# Patient Record
Sex: FEMALE | Race: WHITE | NOT HISPANIC OR LATINO | Employment: FULL TIME | URBAN - METROPOLITAN AREA
[De-identification: names, ages, dates, MRNs, and addresses within clinical notes are randomized per-mention and may not be internally consistent; named-entity substitution may affect disease eponyms.]

---

## 2018-05-09 DIAGNOSIS — E06.3 HYPOTHYROIDISM DUE TO HASHIMOTO'S THYROIDITIS: Primary | ICD-10-CM

## 2018-05-09 DIAGNOSIS — E03.8 HYPOTHYROIDISM DUE TO HASHIMOTO'S THYROIDITIS: Primary | ICD-10-CM

## 2018-05-09 PROBLEM — E04.9 GOITER: Status: ACTIVE | Noted: 2018-05-09

## 2018-05-09 PROBLEM — E04.1 THYROID NODULE: Status: ACTIVE | Noted: 2018-05-09

## 2018-05-09 PROBLEM — E16.2 HYPOGLYCEMIA: Status: ACTIVE | Noted: 2018-05-09

## 2018-05-26 ENCOUNTER — OFFICE VISIT (OUTPATIENT)
Dept: URGENT CARE | Facility: MEDICAL CENTER | Age: 43
End: 2018-05-26

## 2018-05-26 VITALS
DIASTOLIC BLOOD PRESSURE: 56 MMHG | RESPIRATION RATE: 16 BRPM | TEMPERATURE: 98 F | BODY MASS INDEX: 20.33 KG/M2 | HEART RATE: 80 BPM | SYSTOLIC BLOOD PRESSURE: 96 MMHG | WEIGHT: 122 LBS | OXYGEN SATURATION: 99 % | HEIGHT: 65 IN

## 2018-05-26 DIAGNOSIS — I95.1 ORTHOSTATIC HYPOTENSION: Primary | ICD-10-CM

## 2018-05-26 PROCEDURE — G0382 LEV 3 HOSP TYPE B ED VISIT: HCPCS | Performed by: FAMILY MEDICINE

## 2018-05-26 RX ORDER — LEVOTHYROXINE SODIUM 88 UG/1
TABLET ORAL
COMMUNITY
Start: 2017-08-28 | End: 2018-07-11 | Stop reason: SDUPTHER

## 2018-05-26 RX ORDER — ALBUTEROL SULFATE 90 UG/1
2 AEROSOL, METERED RESPIRATORY (INHALATION) EVERY 6 HOURS
COMMUNITY
Start: 2015-12-15 | End: 2018-09-12 | Stop reason: ALTCHOICE

## 2018-05-26 RX ORDER — CLOTRIMAZOLE AND BETAMETHASONE DIPROPIONATE 10; .64 MG/G; MG/G
CREAM TOPICAL 2 TIMES DAILY
COMMUNITY
Start: 2015-09-09 | End: 2018-09-12 | Stop reason: ALTCHOICE

## 2018-05-26 RX ORDER — LORATADINE 10 MG/1
CAPSULE, LIQUID FILLED ORAL
COMMUNITY
End: 2018-09-12 | Stop reason: ALTCHOICE

## 2018-05-26 NOTE — PROGRESS NOTES
[facility]        NAME: Aziza Kraus is a 40 y o  female  : 1975    MRN: [id number]  DATE: May 26, 2018  TIME: 1:18 PM    Assessment and Plan   Orthostatic hypotension [I95 1]  1  Orthostatic hypotension           Patient Instructions       Follow up with PCP in 3-5 days  Proceed to  ER if symptoms worsen  Chief Complaint     Chief Complaint   Patient presents with    Dizziness     Pt states she had 2 episodes of feeling faint, lightheadedness, sweatiness, paleness, vomiting, and right elbow pain  Pt states her head feels funny and she is tired  History of Present Illness       Patient had 2 episodes of dizziness lightheadedness that occurred as of 3:00 a m  Her 1st episode lasted about 15 minutes before resolving  Denies any loss of consciousness  She had a 2nd episode several hours later and decided to come to urgent care to to be assessed  At the present time she is mildly symptomatic  Denies any nausea or vomiting  Denies any chest pain or palpitation  Upon further questioning, she has similar episode several years ago but does not recall having any extensive workup  She does have a history of hypothyroidism, low blood sugar  She describes that her periods are fairly regular but believes she is perimenopausal   Denies heavy menses  Review of Systems   Review of Systems   Constitutional: Positive for fatigue  HENT: Negative  Respiratory: Negative  Cardiovascular: Negative  Neurological: Positive for dizziness           Current Medications       Current Outpatient Prescriptions:     albuterol (PROVENTIL HFA,VENTOLIN HFA) 90 mcg/act inhaler, Inhale 2 puffs every 6 (six) hours, Disp: , Rfl:     clotrimazole-betamethasone (LOTRISONE) 1-0 05 % cream, Apply topically Twice daily, Disp: , Rfl:     levothyroxine (SYNTHROID) 88 mcg tablet, TAKE 1 TABLET BY MOUTH DAILY, Disp: , Rfl:     Norethin-Eth Estrad-Fe Biphas (LO LOESTRIN FE) 1 MG-10 MCG / 10 MCG TABS, Take 1 tablet by mouth daily, Disp: , Rfl:     terconazole (TERAZOL 7) 0 4 % vaginal cream, Insert 1 Applicatorful into the vagina, Disp: , Rfl:     Loratadine (CLARITIN) 10 MG CAPS, Take by mouth, Disp: , Rfl:     Current Allergies     Allergies as of 05/26/2018 - Reviewed 05/26/2018   Allergen Reaction Noted    Sulfa antibiotics Anaphylaxis 11/17/2011    Sulfites Hives and Shortness Of Breath 09/09/2015    Adhesive  [medical tape] Hives 06/24/2013    Other  01/15/2016    Penicillin g  08/24/2015            The following portions of the patient's history were reviewed and updated as appropriate: allergies, current medications, past family history, past medical history, past social history, past surgical history and problem list      History reviewed  No pertinent past medical history  History reviewed  No pertinent surgical history  History reviewed  No pertinent family history  Medications have been verified  Objective   BP 96/56   Pulse 80   Temp 98 °F (36 7 °C)   Resp 16   Ht 5' 5" (1 651 m)   Wt 55 3 kg (122 lb)   SpO2 99%   BMI 20 30 kg/m²        Physical Exam     Physical Exam   Constitutional: She is oriented to person, place, and time  Eyes: EOM are normal  Pupils are equal, round, and reactive to light  Neck: Neck supple  Cardiovascular: Normal rate, regular rhythm and normal heart sounds  Pulmonary/Chest: Effort normal and breath sounds normal    Neurological: She is alert and oriented to person, place, and time  No cranial nerve deficit  Romberg test-negative  Gait normal   Tandem gait-normal    Nursing note and vitals reviewed

## 2018-05-26 NOTE — PATIENT INSTRUCTIONS
Orthostatic blood pressure readings are within normal limits  Neurologic status normal   I advised patient to increase fluid intake, eat small frequent meals throughout the day  Also advised patient to increase salt intake  Recommend patient obtain blood pressure machine to monitor blood pressure  Recommended the patient follow up with primary care provider to request more updated thyroid function tests and blood count to rule out possible anemia  If symptoms persist or worsen today, she is to go to the emergency room  Hypotension   WHAT YOU NEED TO KNOW:   Hypotension is a condition that causes your blood pressure (BP) to drop lower than it should be  Hypotension may be mild, serious, or life-threatening  DISCHARGE INSTRUCTIONS:   Medicines:   · Alpha-adrenoreceptor agonists: These medicines may increase your BP and decrease your symptoms  Take these medicines as directed  · Steroids: This medicine helps prevent salt loss from your body  Steroids may also help increase the amount of fluid in your body and raise your BP  · Vasopressors: These medicines help constrict (make smaller) your blood vessels and increase your BP  Vasopressor medicines may increase the blood flow to your brain and help decrease your symptoms  · Antidiuretic hormone: This medicine helps control your BP and helps decrease your need to urinate during the night  · Antiparkinson medicine: This medicine may help increase your standing BP and decrease your symptoms  · Take your medicine as directed  Contact your healthcare provider if you think your medicine is not helping or if you have side effects  Tell him of her if you are allergic to any medicine  Keep a list of the medicines, vitamins, and herbs you take  Include the amounts, and when and why you take them  Bring the list or the pill bottles to follow-up visits  Carry your medicine list with you in case of an emergency    Follow up with your healthcare provider or specialist as directed:  Write down your questions so you remember to ask them during your visits  Check your blood pressure: You may need to check your BP at home  Record the results and bring them with you to follow-up visits  Ask when and how often to check your BP  You may need to wear a BP monitor for up to 24 hours  This will record your blood pressure during your normal daily activities  The monitor will take your BP every 15 to 30 minutes  Try to keep still while your BP is taken  Avoid heavy activity, such as exercise, while you are wearing the monitor  Manage your symptoms:   · Change positions slowly:  When you get out of bed, sit up first, then slowly move your legs to the side of the bed  If you are not having any symptoms, slowly stand up  If you have symptoms, sit down right away  · Exercise and do physical counter maneuvers:  Ask your healthcare provider or specialist about the best exercise plan for you  Physical counter maneuvers may help to increase your BP and increase blood flow to your heart  They include crossing your legs, squatting, and bending at the waist  You can also rise up on your toes while you are standing, and tighten your thigh muscles  · Drink liquids as directed:  Ask your healthcare provider or specialist how much liquid to drink each day and which liquids are best for you  Drink 500 milliliters (½ liter) of liquid quickly in the morning or before meals to help increase your BP  Your healthcare provider may tell you to drink 2 cups of coffee with, or after, breakfast and lunch  The caffeine in the coffee can help prevent a drop in your BP  Your healthcare provider may also give you caffeine pills  · Change how you eat meals: If your BP drops after eating large meals, try to eat smaller meals more often  Eat foods low in carbohydrates and cholesterol to help prevent BP drops after you eat   Ask if you need to increase the amount of sodium (salt) you eat each day     · Raise the head of your bed:  Raise the head of your bed 4 to 8 inches  This may help prevent morning BP drops and decrease the need to urinate during the night  Avoid things that make your hypotension worse:   · Do not drink alcohol:  Alcohol can make your symptoms worse  Ask for information if you need help quitting  · Avoid straining:  Activities and movements that cause you to strain can cause a drop in your BP  Activities to avoid include lifting, coughing, and other movements that increase the feeling of pressure in your chest     · Avoid the heat: This can cause a decrease in your BP  Stay inside during very hot days, or limit the amount of time you are outside  Do not take hot baths  Contact your healthcare provider or specialist if:   · You vomit several times or have diarrhea, and you cannot drink liquid  · You have a fever  · You have new or increased symptoms, such as dizziness, weakness, or fainting  · Your legs, ankles, and feet are swollen, or you gain weight for no known reason  · You have questions or concerns about your condition or care  Return to the emergency department if:   · You become confused or cannot speak  · You urinate very little or not at all  · You have a seizure  · You have chest pain or trouble breathing  · You have changes in vision or cannot see  © 2017 [address] Information is for End User's use only and may not be sold, redistributed or otherwise used for commercial purposes  All illustrations and images included in CareNotes® are the copyrighted property of [employer]  or [employer]  The above information is an  only  It is not intended as medical advice for individual conditions or treatments  Talk to your doctor, nurse or pharmacist before following any medical regimen to see if it is safe and effective for you

## 2018-07-05 ENCOUNTER — DOCUMENTATION (OUTPATIENT)
Dept: ENDOCRINOLOGY | Facility: HOSPITAL | Age: 43
End: 2018-07-05

## 2018-07-11 ENCOUNTER — TELEPHONE (OUTPATIENT)
Dept: ENDOCRINOLOGY | Facility: HOSPITAL | Age: 43
End: 2018-07-11

## 2018-07-11 DIAGNOSIS — E03.9 HYPOTHYROIDISM, UNSPECIFIED TYPE: Primary | ICD-10-CM

## 2018-07-11 RX ORDER — LEVOTHYROXINE SODIUM 88 MCG
TABLET ORAL
Qty: 30 TABLET | Refills: 8 | Status: SHIPPED | OUTPATIENT
Start: 2018-07-11 | End: 2018-09-12 | Stop reason: SDUPTHER

## 2018-09-12 ENCOUNTER — OFFICE VISIT (OUTPATIENT)
Dept: ENDOCRINOLOGY | Facility: HOSPITAL | Age: 43
End: 2018-09-12

## 2018-09-12 VITALS
DIASTOLIC BLOOD PRESSURE: 68 MMHG | WEIGHT: 127 LBS | HEART RATE: 78 BPM | BODY MASS INDEX: 21.16 KG/M2 | SYSTOLIC BLOOD PRESSURE: 110 MMHG | HEIGHT: 65 IN

## 2018-09-12 DIAGNOSIS — E03.9 HYPOTHYROIDISM, UNSPECIFIED TYPE: ICD-10-CM

## 2018-09-12 DIAGNOSIS — E16.2 HYPOGLYCEMIA: ICD-10-CM

## 2018-09-12 DIAGNOSIS — E03.8 HYPOTHYROIDISM DUE TO HASHIMOTO'S THYROIDITIS: Primary | ICD-10-CM

## 2018-09-12 DIAGNOSIS — E06.3 HYPOTHYROIDISM DUE TO HASHIMOTO'S THYROIDITIS: Primary | ICD-10-CM

## 2018-09-12 DIAGNOSIS — L65.9 HAIR LOSS: ICD-10-CM

## 2018-09-12 DIAGNOSIS — E04.9 GOITER: ICD-10-CM

## 2018-09-12 DIAGNOSIS — E04.1 THYROID NODULE: ICD-10-CM

## 2018-09-12 PROCEDURE — 99203 OFFICE O/P NEW LOW 30 MIN: CPT | Performed by: INTERNAL MEDICINE

## 2018-09-12 RX ORDER — LEVOTHYROXINE SODIUM 88 MCG
88 TABLET ORAL DAILY
Qty: 30 TABLET | Refills: 11 | Status: SHIPPED | OUTPATIENT
Start: 2018-09-12 | End: 2019-03-08 | Stop reason: SDUPTHER

## 2018-09-12 NOTE — PROGRESS NOTES
9/12/2018    Assessment/Plan      Diagnoses and all orders for this visit:    Hypothyroidism due to Hashimoto's thyroiditis  -     TSH, 3rd generation Lab Collect; Future  -     Thyroid Antibodies Panel Lab Collect; Future  -     T4, free Lab Collect; Future  -     Comprehensive metabolic panel Lab Collect; Future  -     US thyroid; Future    Thyroid nodule  -     TSH, 3rd generation Lab Collect; Future  -     Thyroid Antibodies Panel Lab Collect; Future  -     T4, free Lab Collect; Future  -     Comprehensive metabolic panel Lab Collect; Future  -     US thyroid; Future    Goiter  -     TSH, 3rd generation Lab Collect; Future  -     Thyroid Antibodies Panel Lab Collect; Future  -     T4, free Lab Collect; Future  -     Comprehensive metabolic panel Lab Collect; Future  -     US thyroid; Future    Hair loss  -     CBC and differential Lab Collect  -     Comprehensive metabolic panel Lab Collect  -     Ferritin Lab Collect  -     Testosterone, free, total Lab Collect    Hypoglycemia  -     Comprehensive metabolic panel Lab Collect  -     Comprehensive metabolic panel Lab Collect; Future    Hypothyroidism, unspecified type  -     SYNTHROID 88 MCG tablet; Take 1 tablet (88 mcg total) by mouth daily    Other orders  -     Fexofenadine HCl (ALLEGRA ALLERGY PO); Take by mouth        Assessment/Plan:  1  Hypothyroidism due to Hashimoto's thyroiditis  Most recent thyroid function tests are normal  For now, she will continue the same Synthroid 88 mcg daily  I reassured her that her hair loss is not related to her thyroid at this point  2   Thyroid nodule and goiter  We have been following this with serial ultrasounds  She is due for an ultrasound next year  3   History of hypoglycemia  She has had no recent symptoms of hypoglycemia  4   Hair loss  I reassured her that her thyroid function tests are normal in that her this is not due to her thyroid    I will evaluate that by doing a CMP, CBC, ferritin level, and free and total testosterone levels now  If these are normal, then I recommended dermatology evaluation  I have asked her to follow up in 1 year with preceding TSH, free T4, and thyroid antibody panel along with thyroid ultrasound  CC: thyroid consult    History of Present Illness     HPI: Laurent Guillen is a 44y o  year old female with history of hypothyroidism due to Hashimoto's thyroiditis with thyroid nodule and goiter  She was diagnosed with hypothyroidism around [id number]  She had transient hyperthyroidism and then hypothyroidism  She had thyroid nodules noted and biopsied at that time  The biopsy was inconclusive  She choose not to do surgery  She has been followed by serial ultrasounds  She is currently taking Synthroid brand 88 mcg daily  She always tends to be on the cold side  She denies heat intolerance, diarrhea, constipation, palpitation, or tremors  She has both anxiety and depression in the depression is a little worse recently  She has insomnia and that she will wake up frequently and is thus fatigued  She has gained about 8 lb since last year  She has no dry skin except on her face  She has no brittle nails  She continues to have hair loss and hair is thinning consistently, she wears a hair piece now  She has no diplopia  She has no difficulties with swallowing or compressive thyroid symptoms  She has no history of head or neck irradiation  Menstrual cycles do occur on a regular monthly basis, the timing can vary at times  She is now has associated low blood pressure and near syncope along with nausea with her menstrual period  Review of Systems   Constitutional: Positive for fatigue  Negative for unexpected weight change  HENT: Negative for hearing loss, tinnitus and trouble swallowing  No XRT to the head or neck in the past    Eyes: Negative for visual disturbance  Wears glasses for distance  No diplopia     Respiratory: Negative for chest tightness and shortness of breath  Will take occasional deep breaths, involuntary  Cardiovascular: Negative for chest pain, palpitations and leg swelling  Gastrointestinal: Positive for abdominal pain  Negative for constipation, diarrhea and nausea  Has some low abdominal/pelvic pain, worse with squatting or bending over  To see GYN for evaluation  Endocrine: Positive for cold intolerance  Negative for heat intolerance  Genitourinary:        Menses monthly but were more variable with timing  Now 1 day heavy menstrual flow  Then lighter, but longer  Will get low blood pressure and near syncope around menses with nausea  Musculoskeletal: Positive for arthralgias  Negative for back pain  Generally achy in trunk,back, chest wall  Skin: Negative for rash  Some facial dry skin  No brittle nails  Still lots of hair loss, very thin hair  Neurological: Negative for tremors  Psychiatric/Behavioral: Positive for dysphoric mood and sleep disturbance  The patient is nervous/anxious  Anxiety and depression, depression a bit more these days  Has not seen psychiatrist in 1 year, to go back  Waking up more at night  Historical Information   Past Medical History:   Diagnosis Date    Costochondritis     Seasonal allergies      No past surgical history on file    Social History   History   Alcohol Use No     History   Drug Use No     History   Smoking Status    Never Smoker   Smokeless Tobacco    Never Used     Family History:   Family History   Problem Relation Age of Onset    Hypertension Mother     Mitral valve prolapse Mother     Heart disease Father     Hyperlipidemia Father     Breast cancer Paternal Aunt     Hypothyroidism Paternal Aunt     Cancer Maternal Grandmother     Hypothyroidism Maternal Grandmother     No Known Problems Sister     No Known Problems Daughter        Meds/Allergies   Current Outpatient Prescriptions   Medication Sig Dispense Refill    Fexofenadine HCl (ALLEGRA ALLERGY PO) Take by mouth      SYNTHROID 88 MCG tablet Take 1 tablet (88 mcg total) by mouth daily 30 tablet 11     No current facility-administered medications for this visit  Allergies   Allergen Reactions    Sulfa Antibiotics Anaphylaxis     Anaphylaxis    Sulfites Anaphylaxis, Hives and Shortness Of Breath    Adhesive  [Medical Tape] Hives    Other      Fluorescent and halogen lights    Penicillin G        Objective   Vitals: Blood pressure 110/68, pulse 78, height 5' 5" (1 651 m), weight 57 6 kg (127 lb)  Invasive Devices          No matching active lines, drains, or airways          Physical Exam   Constitutional: She is oriented to person, place, and time  She appears well-developed and well-nourished  HENT:   Head: Normocephalic and atraumatic  Eyes: Conjunctivae and EOM are normal  Pupils are equal, round, and reactive to light  No lid lag, stare, proptosis, or periorbital edema  Neck: Normal range of motion  Neck supple  No thyromegaly present  Thyroid irregular without discrete nodules palpable  No bruits over the thyroid gland or carotids  Cardiovascular: Normal rate, regular rhythm, normal heart sounds and intact distal pulses  No murmur heard  Pulmonary/Chest: Effort normal and breath sounds normal  She has no wheezes  Abdominal: Soft  Bowel sounds are normal  There is no tenderness  Musculoskeletal: Normal range of motion  She exhibits no edema or deformity  No tremor of the outstretched hands  No spinous process tenderness  No CVA tenderness  Lymphadenopathy:     She has no cervical adenopathy  Neurological: She is alert and oriented to person, place, and time  She has normal reflexes  Skin: Skin is warm and dry  No rash noted  Hair noticeably thinner with no bald patches  Vitals reviewed  The history was obtained from the review of the chart and from the patient      Lab Results:    Network labs on 07/03/2018 showed a TSH of 2 69 with a free T4 of 0 96  No results found for this or any previous visit (from the past 77940 hour(s))        Future Appointments  Date Time Provider Alex Jolley   9/12/2019 8:40 AM Gaby Reece MD [facility]

## 2018-09-12 NOTE — PATIENT INSTRUCTIONS
Thyroid blood work is normal   No change in Synthroid 88 mcg daily  Given hair loss, let's do some blood work, if normal, then you could see a dermatologist for evaluation  Call 1 week after the blood work is drawn for results  Follow up in 1 year with blood work and thyroid ultrasound

## 2018-09-12 NOTE — LETTER
September 12, 2018     David Cuellar MD  [address]  [address] [facility] [address] [facility] [address]    Patient: Azeb Saraiba   YOB: 1975   Date of Visit: 9/12/2018       Dear Dr Abilio Castillo: Thank you for referring Azeb Sarabia to me for evaluation  Below are my notes for this consultation  If you have questions, please do not hesitate to call me  I look forward to following your patient along with you  Sincerely,        Venu Lazcano MD        CC: No Recipients  Venu Lazcano MD  9/12/2018 10:53 AM  Sign at close encounter  9/12/2018    Assessment/Plan      Diagnoses and all orders for this visit:    Hypothyroidism due to Hashimoto's thyroiditis  -     TSH, 3rd generation Lab Collect; Future  -     Thyroid Antibodies Panel Lab Collect; Future  -     T4, free Lab Collect; Future  -     Comprehensive metabolic panel Lab Collect; Future  -     US thyroid; Future    Thyroid nodule  -     TSH, 3rd generation Lab Collect; Future  -     Thyroid Antibodies Panel Lab Collect; Future  -     T4, free Lab Collect; Future  -     Comprehensive metabolic panel Lab Collect; Future  -     US thyroid; Future    Goiter  -     TSH, 3rd generation Lab Collect; Future  -     Thyroid Antibodies Panel Lab Collect; Future  -     T4, free Lab Collect; Future  -     Comprehensive metabolic panel Lab Collect; Future  -     US thyroid; Future    Hair loss  -     CBC and differential Lab Collect  -     Comprehensive metabolic panel Lab Collect  -     Ferritin Lab Collect  -     Testosterone, free, total Lab Collect    Hypoglycemia  -     Comprehensive metabolic panel Lab Collect  -     Comprehensive metabolic panel Lab Collect; Future    Hypothyroidism, unspecified type  -     SYNTHROID 88 MCG tablet; Take 1 tablet (88 mcg total) by mouth daily    Other orders  -     Fexofenadine HCl (ALLEGRA ALLERGY PO); Take by mouth        Assessment/Plan:  1  Hypothyroidism due to Hashimoto's thyroiditis    Most recent thyroid function tests are normal  For now, she will continue the same Synthroid 88 mcg daily  I reassured her that her hair loss is not related to her thyroid at this point  2   Thyroid nodule and goiter  We have been following this with serial ultrasounds  She is due for an ultrasound next year  3   History of hypoglycemia  She has had no recent symptoms of hypoglycemia  4   Hair loss  I reassured her that her thyroid function tests are normal in that her this is not due to her thyroid  I will evaluate that by doing a CMP, CBC, ferritin level, and free and total testosterone levels now  If these are normal, then I recommended dermatology evaluation  I have asked her to follow up in 1 year with preceding TSH, free T4, and thyroid antibody panel along with thyroid ultrasound  CC: thyroid consult    History of Present Illness     HPI: Mable Denver is a 44y o  year old female with history of hypothyroidism due to Hashimoto's thyroiditis with thyroid nodule and goiter  She was diagnosed with hypothyroidism around 3175-3296  She had transient hyperthyroidism and then hypothyroidism  She had thyroid nodules noted and biopsied at that time  The biopsy was inconclusive  She choose not to do surgery  She has been followed by serial ultrasounds  She is currently taking Synthroid brand 88 mcg daily  She always tends to be on the cold side  She denies heat intolerance, diarrhea, constipation, palpitation, or tremors  She has both anxiety and depression in the depression is a little worse recently  She has insomnia and that she will wake up frequently and is thus fatigued  She has gained about 8 lb since last year  She has no dry skin except on her face  She has no brittle nails  She continues to have hair loss and hair is thinning consistently, she wears a hair piece now  She has no diplopia  She has no difficulties with swallowing or compressive thyroid symptoms    She has no history of head or neck irradiation  Menstrual cycles do occur on a regular monthly basis, the timing can vary at times  She is now has associated low blood pressure and near syncope along with nausea with her menstrual period  Review of Systems   Constitutional: Positive for fatigue  Negative for unexpected weight change  HENT: Negative for hearing loss, tinnitus and trouble swallowing  No XRT to the head or neck in the past    Eyes: Negative for visual disturbance  Wears glasses for distance  No diplopia  Respiratory: Negative for chest tightness and shortness of breath  Will take occasional deep breaths, involuntary  Cardiovascular: Negative for chest pain, palpitations and leg swelling  Gastrointestinal: Positive for abdominal pain  Negative for constipation, diarrhea and nausea  Has some low abdominal/pelvic pain, worse with squatting or bending over  To see GYN for evaluation  Endocrine: Positive for cold intolerance  Negative for heat intolerance  Genitourinary:        Menses monthly but were more variable with timing  Now 1 day heavy menstrual flow  Then lighter, but longer  Will get low blood pressure and near syncope around menses with nausea  Musculoskeletal: Positive for arthralgias  Negative for back pain  Generally achy in trunk,back, chest wall  Skin: Negative for rash  Some facial dry skin  No brittle nails  Still lots of hair loss, very thin hair  Neurological: Negative for tremors  Psychiatric/Behavioral: Positive for dysphoric mood and sleep disturbance  The patient is nervous/anxious  Anxiety and depression, depression a bit more these days  Has not seen psychiatrist in 1 year, to go back  Waking up more at night  Historical Information   Past Medical History:   Diagnosis Date    Costochondritis     Seasonal allergies      No past surgical history on file    Social History   History   Alcohol Use No     History   Drug Use No     History Smoking Status    Never Smoker   Smokeless Tobacco    Never Used     Family History:   Family History   Problem Relation Age of Onset    Hypertension Mother     Mitral valve prolapse Mother     Heart disease Father     Hyperlipidemia Father     Breast cancer Paternal Aunt     Hypothyroidism Paternal Aunt     Cancer Maternal Grandmother     Hypothyroidism Maternal Grandmother     No Known Problems Sister     No Known Problems Daughter        Meds/Allergies   Current Outpatient Prescriptions   Medication Sig Dispense Refill    Fexofenadine HCl (ALLEGRA ALLERGY PO) Take by mouth      SYNTHROID 88 MCG tablet Take 1 tablet (88 mcg total) by mouth daily 30 tablet 11     No current facility-administered medications for this visit  Allergies   Allergen Reactions    Sulfa Antibiotics Anaphylaxis     Anaphylaxis    Sulfites Anaphylaxis, Hives and Shortness Of Breath    Adhesive  [Medical Tape] Hives    Other      Fluorescent and halogen lights    Penicillin G        Objective   Vitals: Blood pressure 110/68, pulse 78, height 5' 5" (1 651 m), weight 57 6 kg (127 lb)  Invasive Devices          No matching active lines, drains, or airways          Physical Exam   Constitutional: She is oriented to person, place, and time  She appears well-developed and well-nourished  HENT:   Head: Normocephalic and atraumatic  Eyes: Conjunctivae and EOM are normal  Pupils are equal, round, and reactive to light  No lid lag, stare, proptosis, or periorbital edema  Neck: Normal range of motion  Neck supple  No thyromegaly present  Thyroid irregular without discrete nodules palpable  No bruits over the thyroid gland or carotids  Cardiovascular: Normal rate, regular rhythm, normal heart sounds and intact distal pulses  No murmur heard  Pulmonary/Chest: Effort normal and breath sounds normal  She has no wheezes  Abdominal: Soft  Bowel sounds are normal  There is no tenderness     Musculoskeletal: Normal range of motion  She exhibits no edema or deformity  No tremor of the outstretched hands  No spinous process tenderness  No CVA tenderness  Lymphadenopathy:     She has no cervical adenopathy  Neurological: She is alert and oriented to person, place, and time  She has normal reflexes  Skin: Skin is warm and dry  No rash noted  Hair noticeably thinner with no bald patches  Vitals reviewed  The history was obtained from the review of the chart and from the patient  Lab Results:    Network labs on 07/03/2018 showed a TSH of 2 69 with a free T4 of 0 96  No results found for this or any previous visit (from the past 63251 hour(s))        Future Appointments  Date Time Provider Alex Jolley   9/12/2019 8:40 AM Rosario Garcia MD [facility]

## 2019-03-05 ENCOUNTER — TELEPHONE (OUTPATIENT)
Dept: ENDOCRINOLOGY | Facility: HOSPITAL | Age: 44
End: 2019-03-05

## 2019-03-05 NOTE — TELEPHONE ENCOUNTER
Patient would like to get blood work done now  Is scheduled for 9-12-19, but is experiencing symptoms (hives & weight gain) that she said are typically associated to her thyroid levels being off  FYI, sh also found out a couple of months ago that she has ovarian cysts  Do you want to order blood work for now? Told patient I'd call her back either way  Can mail lab slip & may l/m on her cell

## 2019-03-06 DIAGNOSIS — E04.9 GOITER: ICD-10-CM

## 2019-03-06 DIAGNOSIS — E04.1 THYROID NODULE: ICD-10-CM

## 2019-03-06 DIAGNOSIS — L65.9 HAIR LOSS: ICD-10-CM

## 2019-03-06 DIAGNOSIS — E06.3 HYPOTHYROIDISM DUE TO HASHIMOTO'S THYROIDITIS: Primary | ICD-10-CM

## 2019-03-06 DIAGNOSIS — E03.8 HYPOTHYROIDISM DUE TO HASHIMOTO'S THYROIDITIS: Primary | ICD-10-CM

## 2019-03-07 ENCOUNTER — APPOINTMENT (OUTPATIENT)
Dept: LAB | Facility: MEDICAL CENTER | Age: 44
End: 2019-03-07
Payer: COMMERCIAL

## 2019-03-07 ENCOUNTER — TELEPHONE (OUTPATIENT)
Dept: ENDOCRINOLOGY | Facility: HOSPITAL | Age: 44
End: 2019-03-07

## 2019-03-07 DIAGNOSIS — E04.9 GOITER: ICD-10-CM

## 2019-03-07 DIAGNOSIS — E04.1 THYROID NODULE: ICD-10-CM

## 2019-03-07 DIAGNOSIS — L65.9 HAIR LOSS: ICD-10-CM

## 2019-03-07 DIAGNOSIS — E03.8 HYPOTHYROIDISM DUE TO HASHIMOTO'S THYROIDITIS: ICD-10-CM

## 2019-03-07 DIAGNOSIS — E06.3 HYPOTHYROIDISM DUE TO HASHIMOTO'S THYROIDITIS: ICD-10-CM

## 2019-03-07 LAB — TSH SERPL DL<=0.05 MIU/L-ACNC: 3.55 UIU/ML (ref 0.36–3.74)

## 2019-03-07 PROCEDURE — 84443 ASSAY THYROID STIM HORMONE: CPT

## 2019-03-07 PROCEDURE — 36415 COLL VENOUS BLD VENIPUNCTURE: CPT

## 2019-03-07 PROCEDURE — 84439 ASSAY OF FREE THYROXINE: CPT

## 2019-03-08 ENCOUNTER — TELEPHONE (OUTPATIENT)
Dept: ENDOCRINOLOGY | Facility: HOSPITAL | Age: 44
End: 2019-03-08

## 2019-03-08 DIAGNOSIS — E04.9 GOITER: ICD-10-CM

## 2019-03-08 DIAGNOSIS — E03.9 HYPOTHYROIDISM, UNSPECIFIED TYPE: ICD-10-CM

## 2019-03-08 DIAGNOSIS — E06.3 HYPOTHYROIDISM DUE TO HASHIMOTO'S THYROIDITIS: Primary | ICD-10-CM

## 2019-03-08 DIAGNOSIS — E04.1 THYROID NODULE: ICD-10-CM

## 2019-03-08 DIAGNOSIS — E03.8 HYPOTHYROIDISM DUE TO HASHIMOTO'S THYROIDITIS: Primary | ICD-10-CM

## 2019-03-08 LAB — T4 FREE SERPL-MCNC: 1.04 NG/DL (ref 0.76–1.46)

## 2019-03-08 RX ORDER — LEVOTHYROXINE SODIUM 88 MCG
TABLET ORAL
Qty: 32 TABLET | Refills: 5 | Status: SHIPPED | OUTPATIENT
Start: 2019-03-08 | End: 2019-09-13 | Stop reason: SDUPTHER

## 2019-03-08 NOTE — TELEPHONE ENCOUNTER
Her thyroid levels are still normal so I can increase her dosage of thyroid hormone by any larger amounts or then I might make her hyperthyroid  Very small amounts of thyroid hormone can make it large difference in the body  She probably will notice any change in how she feels in about 2-4 weeks  I would repeat a TSH with free T4 in about 6-8 weeks

## 2019-03-08 NOTE — TELEPHONE ENCOUNTER
Patient had labs drawn last night and is almost out of her thyroid hormone  The lab did forget to draw the Free T4, but will be adding it on  Are you able to adjust her dose based on the TSH? She will be out of pills on Monday

## 2019-03-08 NOTE — TELEPHONE ENCOUNTER
Her thyroid blood work is a bit more on the overactive side of normal   We can increase her dose to synthroid 88 mcg 1 tablet 6 days a week and 1 5 tablets 1 day a week

## 2019-03-08 NOTE — TELEPHONE ENCOUNTER
Received call from patient  She has a few questions  She would like to know how only an increase of half a tablet a week is going to help her  She states all her other doctors have always increased in a greater amount when needed  She would also like to know how quickly she will notice a change in her symptoms, she notes hives  She would also like to know when she should have new labs done  Please advise

## 2019-03-14 ENCOUNTER — TRANSCRIBE ORDERS (OUTPATIENT)
Dept: ADMINISTRATIVE | Facility: HOSPITAL | Age: 44
End: 2019-03-14

## 2019-09-05 ENCOUNTER — TELEPHONE (OUTPATIENT)
Dept: ENDOCRINOLOGY | Facility: HOSPITAL | Age: 44
End: 2019-09-05

## 2019-09-06 DIAGNOSIS — E06.3 HYPOTHYROIDISM DUE TO HASHIMOTO'S THYROIDITIS: Primary | ICD-10-CM

## 2019-09-06 DIAGNOSIS — E03.8 HYPOTHYROIDISM DUE TO HASHIMOTO'S THYROIDITIS: Primary | ICD-10-CM

## 2019-09-06 NOTE — TELEPHONE ENCOUNTER
Reviewed Cate's lab results from [facility] dated September 4, 2019  Her comprehensive metabolic panel is normal   CBC is normal   TSH is slightly elevated at 4 21  And vitamin-D is low at 15 0  Has she missed any doses of her thyroid medication? If she currently supplementing with vitamin-D    Consistently?

## 2019-09-06 NOTE — TELEPHONE ENCOUNTER
Spoke with patient  She states she has only missed her thyroid medication once this week  She is not taking any vitamin d supplements

## 2019-09-06 NOTE — TELEPHONE ENCOUNTER
Okay   I would suggest that she continue Synthroid 88 mcg Monday through Saturday and take 2 tablets on Sunday  Recheck TSH and free T4 in 6 weeks to reassess  Goal for her TSH should be somewhere between 1 0 and 2 0  She may also start supplementing with over-the-counter vitamin D3 2000 units daily  Will continue to monitor her vitamin-D level over time

## 2019-09-13 DIAGNOSIS — E03.9 HYPOTHYROIDISM, UNSPECIFIED TYPE: ICD-10-CM

## 2019-09-13 RX ORDER — LEVOTHYROXINE SODIUM 88 MCG
TABLET ORAL
Qty: 32 TABLET | Refills: 5 | Status: SHIPPED | OUTPATIENT
Start: 2019-09-13 | End: 2019-10-22 | Stop reason: SDUPTHER

## 2019-10-03 ENCOUNTER — TELEPHONE (OUTPATIENT)
Dept: ENDOCRINOLOGY | Facility: HOSPITAL | Age: 44
End: 2019-10-03

## 2019-10-13 ENCOUNTER — OFFICE VISIT (OUTPATIENT)
Dept: URGENT CARE | Facility: MEDICAL CENTER | Age: 44
End: 2019-10-13
Payer: COMMERCIAL

## 2019-10-13 VITALS
DIASTOLIC BLOOD PRESSURE: 62 MMHG | SYSTOLIC BLOOD PRESSURE: 110 MMHG | RESPIRATION RATE: 16 BRPM | TEMPERATURE: 98.1 F | HEART RATE: 85 BPM | OXYGEN SATURATION: 99 %

## 2019-10-13 DIAGNOSIS — R51.9 ACUTE NONINTRACTABLE HEADACHE, UNSPECIFIED HEADACHE TYPE: Primary | ICD-10-CM

## 2019-10-13 DIAGNOSIS — R19.5 LOOSE STOOLS: ICD-10-CM

## 2019-10-13 PROCEDURE — G0383 LEV 4 HOSP TYPE B ED VISIT: HCPCS | Performed by: FAMILY MEDICINE

## 2019-10-13 NOTE — PROGRESS NOTES
[facility]        NAME: Cecelia France is a 38 y o  female  : 1975    MRN: [id number]  DATE: 2019  TIME: 1:44 PM    Assessment and Plan   Acute nonintractable headache, unspecified headache type [R51]  1  Acute nonintractable headache, unspecified headache type     2  Loose stools       May alternate Tylenol and Ibuprofen as needed for discomfort  Encourage fluids and rest    Maintain BRAT diet  Advance diet as tolerated  Gatorade or Pedialyte as supplementation  Avoid dairy products until symptoms resolve  F/U with PCP if symptoms persist/worsen or go to nearest emergency department if any signs of dehydration  Patient Instructions       Follow up with PCP in 3-5 days  Proceed to  ER if symptoms worsen  Chief Complaint     Chief Complaint   Patient presents with    Headache     Pt states had sudden onset of dull headache behind right eye for apporx 15 to 20 minutes following intercourse at 11 am today  Denies headache at present or vision changes   Diarrhea     Had 1 episode of diarrhea and nausea at that time  History of Present Illness       60-year-old female who presents with a headache that resolved on its own  Headache lasted for 15 minutes  She did not take any medication  Now has no neurological deficits  Had 2 loose stools this morning  Nonbloody  No nausea vomiting  Mild abdominal pain    Had Cotto's yesterday and chocolate waffles this morning      Review of Systems   Review of Systems  As above    Current Medications       Current Outpatient Medications:     Fexofenadine HCl (ALLEGRA ALLERGY PO), Take by mouth, Disp: , Rfl:     SYNTHROID 88 MCG tablet, TAKE 1 TABLET 6 DAYS OF THE WEEK AND 1 5 TABLETS 1 DAY A WEEK, Disp: 32 tablet, Rfl: 5    Current Allergies     Allergies as of 10/13/2019 - Reviewed 10/13/2019   Allergen Reaction Noted    Sulfa antibiotics Anaphylaxis 2011    Sulfites Anaphylaxis, Hives, and Shortness Of Breath 09/09/2015    Adhesive  [medical tape] Hives 06/24/2013    Other  01/15/2016    Penicillin g  08/24/2015            The following portions of the patient's history were reviewed and updated as appropriate: allergies, current medications, past family history, past medical history, past social history, past surgical history and problem list      Past Medical History:   Diagnosis Date    Costochondritis     Seasonal allergies        History reviewed  No pertinent surgical history  Family History   Problem Relation Age of Onset    Hypertension Mother     Mitral valve prolapse Mother     Heart disease Father     Hyperlipidemia Father     Breast cancer Paternal Aunt     Hypothyroidism Paternal Aunt     Cancer Maternal Grandmother     Hypothyroidism Maternal Grandmother     No Known Problems Sister     No Known Problems Daughter          Medications have been verified  Objective   /62 (BP Location: Left arm, Patient Position: Sitting, Cuff Size: Standard)   Pulse 85   Temp 98 1 °F (36 7 °C) (Tympanic)   Resp 16   SpO2 99%        Physical Exam     Physical Exam   Constitutional: She is oriented to person, place, and time  She appears well-developed and well-nourished  HENT:   Head: Normocephalic and atraumatic  Mouth/Throat: Oropharynx is clear and moist    Eyes: Pupils are equal, round, and reactive to light  Conjunctivae and EOM are normal    Neck: Neck supple  Cardiovascular: Normal rate, regular rhythm and normal heart sounds  Pulmonary/Chest: Effort normal and breath sounds normal  No respiratory distress  She has no wheezes  She has no rales  Abdominal: Soft  She exhibits no distension  There is no tenderness  Musculoskeletal: Normal range of motion  Neurological: She is alert and oriented to person, place, and time  No cranial nerve deficit  Skin: Skin is warm and dry  Psychiatric: She has a normal mood and affect   Her behavior is normal    Nursing note and vitals reviewed

## 2019-10-22 ENCOUNTER — OFFICE VISIT (OUTPATIENT)
Dept: ENDOCRINOLOGY | Facility: HOSPITAL | Age: 44
End: 2019-10-22
Payer: COMMERCIAL

## 2019-10-22 VITALS
HEART RATE: 88 BPM | HEIGHT: 65 IN | WEIGHT: 134.4 LBS | BODY MASS INDEX: 22.39 KG/M2 | SYSTOLIC BLOOD PRESSURE: 102 MMHG | DIASTOLIC BLOOD PRESSURE: 70 MMHG

## 2019-10-22 DIAGNOSIS — E06.3 HYPOTHYROIDISM DUE TO HASHIMOTO'S THYROIDITIS: Primary | ICD-10-CM

## 2019-10-22 DIAGNOSIS — E55.9 VITAMIN D DEFICIENCY: ICD-10-CM

## 2019-10-22 DIAGNOSIS — E03.9 HYPOTHYROIDISM, UNSPECIFIED TYPE: ICD-10-CM

## 2019-10-22 DIAGNOSIS — E04.1 THYROID NODULE: ICD-10-CM

## 2019-10-22 DIAGNOSIS — E04.9 GOITER: ICD-10-CM

## 2019-10-22 DIAGNOSIS — E03.8 HYPOTHYROIDISM DUE TO HASHIMOTO'S THYROIDITIS: Primary | ICD-10-CM

## 2019-10-22 PROCEDURE — 99214 OFFICE O/P EST MOD 30 MIN: CPT | Performed by: INTERNAL MEDICINE

## 2019-10-22 RX ORDER — MELATONIN
2000 DAILY
Start: 2019-10-22 | End: 2021-12-10

## 2019-10-22 RX ORDER — LEVOTHYROXINE SODIUM 88 MCG
TABLET ORAL
Qty: 35 TABLET | Refills: 11 | Status: SHIPPED | OUTPATIENT
Start: 2019-10-22 | End: 2019-12-09 | Stop reason: SDUPTHER

## 2019-10-22 NOTE — PROGRESS NOTES
10/22/2019    Assessment/Plan      Diagnoses and all orders for this visit:    Hypothyroidism due to Hashimoto's thyroiditis  -     US thyroid; Future  -     TSH, 3rd generation Lab Collect; Future  -     T4, free Lab Collect; Future  -     Comprehensive metabolic panel Lab Collect; Future  -     Vitamin D 25 hydroxy Lab Collect; Future    Thyroid nodule  -     US thyroid; Future  -     TSH, 3rd generation Lab Collect; Future  -     T4, free Lab Collect; Future  -     Comprehensive metabolic panel Lab Collect; Future  -     Vitamin D 25 hydroxy Lab Collect; Future    Goiter  -     US thyroid; Future  -     TSH, 3rd generation Lab Collect; Future  -     T4, free Lab Collect; Future  -     Comprehensive metabolic panel Lab Collect; Future  -     Vitamin D 25 hydroxy Lab Collect; Future    Vitamin D deficiency  -     TSH, 3rd generation Lab Collect; Future  -     T4, free Lab Collect; Future  -     Comprehensive metabolic panel Lab Collect; Future  -     Vitamin D 25 hydroxy Lab Collect; Future  -     cholecalciferol (VITAMIN D3) 1,000 units tablet; Take 2 tablets (2,000 Units total) by mouth daily    Hypothyroidism, unspecified type  -     SYNTHROID 88 MCG tablet; 1 tablet 5 days a week and 1 5 tablets 2 days a week        Assessment/Plan:  1  Hypothyroidism due to Hashimoto's thyroiditis  Most recent thyroid function tests are normal   She is biochemically and clinically euthyroid  She will continue the same Synthroid 88 mcg 1 tablet 5 days a week and 1 5 tablets 2 days a week  2  Thyroid nodule and goiter  She did not yet get her thyroid ultrasound done due to cost issues  I have asked her to get a thyroid ultrasound done at her earliest convenience  3  Vitamin-D deficiency  Her most recent vitamin-D level is low  I recommend vitamin-D 2000 units daily    I have asked her to follow up in 1 year with preceding CMP, 25 hydroxy vitamin-D level, TSH, and free T4       CC:   Hypothyroid, thyroid nodule and goiter follow-up    History of Present Illness     HPI: Candida Parkinson is a 38y o  year old female with history of  Hypothyroidism due to Hashimoto's thyroiditis with thyroid nodule and goiter here for follow-up  She was diagnosed with hypothyroidism or and [id number]  She did have a transient hyperthyroidism but then developed hypothyroidism or permanently  Thyroid nodules were biopsied at that time and inconclusive but she chose not to do surgery so she has been followed with serial ultrasounds for which the thyroid nodules have been stable in size  Unfortunately, she has not done a thyroid ultrasound recently due to cost issues as it is too expensive with her insurance  She is currently on brand-name Synthroid 88 mcg 1 tablet 5 days a week and 1 5 tablets 2 day a week  She admits to always being somewhat cold  She does have some fatigue as yet  She has some dry skin of the face with peeling skin  She has hair loss  She has had loose stools over the last several weeks  She denies heat intolerance, palpitation, tremors, diarrhea or constipation, anxiety or depression, insomnia, brittle nails, or weight changes  She denies diplopia  She has no compressive thyroid symptoms or difficulties with swallowing  Menstrual cycles are occurring on a regular monthly basis and lasting 7-10 days  She has a recent onset of vitamin-D deficiency and has not yet started vitamin-D replacement  Review of Systems   Constitutional: Positive for fatigue  Negative for unexpected weight change  HENT: Negative for trouble swallowing  Eyes: Negative for visual disturbance  Wears glasses  No diplopia  Eyes feel drier than normal    Respiratory: Negative for chest tightness and shortness of breath  Cardiovascular: Positive for chest pain  Negative for palpitations  Usual costochondritis pain unchanged  Gastrointestinal: Positive for abdominal pain  Negative for constipation, diarrhea and nausea          Loose stools over the past 2 weekends  Some mid a to low abdominal pain and tenderness  Endocrine: Positive for cold intolerance  Negative for heat intolerance  Genitourinary:        Menses regular on a monthly basis but lasting 7- 10 days  Musculoskeletal: Positive for arthralgias  More achy in general in the muscles  Skin: Negative for rash  Facial dry skin, peeling  Has hair loss  No brittle nails  Neurological: Negative for dizziness, tremors, light-headedness and headaches  Psychiatric/Behavioral: Negative for dysphoric mood and sleep disturbance  The patient is not nervous/anxious  Historical Information   Past Medical History:   Diagnosis Date    Costochondritis     Fibroid, uterine     Ovarian cyst     Seasonal allergies     Vitamin D deficiency      No past surgical history on file  Social History   Social History     Substance and Sexual Activity   Alcohol Use No     Social History     Substance and Sexual Activity   Drug Use No     Social History     Tobacco Use   Smoking Status Never Smoker   Smokeless Tobacco Never Used     Family History:   Family History   Problem Relation Age of Onset    Hypertension Mother     Mitral valve prolapse Mother     Heart disease Father     Hyperlipidemia Father     Breast cancer Paternal Aunt     Hypothyroidism Paternal Aunt     Cancer Maternal Grandmother     Hypothyroidism Maternal Grandmother     No Known Problems Sister     No Known Problems Daughter        Meds/Allergies   Current Outpatient Medications   Medication Sig Dispense Refill    Fexofenadine HCl (ALLEGRA ALLERGY PO) Take by mouth      SYNTHROID 88 MCG tablet 1 tablet 5 days a week and 1 5 tablets 2 days a week 35 tablet 11    cholecalciferol (VITAMIN D3) 1,000 units tablet Take 2 tablets (2,000 Units total) by mouth daily       No current facility-administered medications for this visit        Allergies   Allergen Reactions    Sulfa Antibiotics Anaphylaxis Anaphylaxis    Sulfites Anaphylaxis, Hives and Shortness Of Breath    Adhesive  [Medical Tape] Hives    Other      Fluorescent and halogen lights    Penicillin G        Objective   Vitals: Blood pressure 102/70, pulse 88, height 5' 5" (1 651 m), weight 61 kg (134 lb 6 4 oz)  Invasive Devices     None                 Physical Exam   Constitutional: She is oriented to person, place, and time  She appears well-developed and well-nourished  HENT:   Head: Normocephalic and atraumatic  Negative Ch[place]ek sign  Eyes: Pupils are equal, round, and reactive to light  Conjunctivae and EOM are normal    No lid lag, stare, proptosis, or periorbital edema  Neck: Normal range of motion  Neck supple  No thyromegaly present  Thyroid nodular irregular in feel without discrete nodules palpable  No bruits over the thyroid gland or carotids  Cardiovascular: Normal rate, regular rhythm and normal heart sounds  No murmur heard  Pulmonary/Chest: Effort normal and breath sounds normal  She has no wheezes  Musculoskeletal: Normal range of motion  She exhibits no edema or deformity  No tremor of the outstretched hands  Lymphadenopathy:     She has no cervical adenopathy  Neurological: She is alert and oriented to person, place, and time  She has normal reflexes  Deep tendon reflexes normal    Skin: Skin is warm and dry  No rash noted  Vitals reviewed  The history was obtained from the review of the chart and from the patient  Lab Results:        Blood work done at [facility] on 10/18/2019 showed a TSH of 0 87 with a free T4 of 1 05      Future Appointments   Date Time Provider Alex Jolley   10/27/2020  8:00 AM Phill Venegas MD [facility]

## 2019-10-22 NOTE — PATIENT INSTRUCTIONS
Recommend vitamin D 2000 iu daily for low vitamin D level  The thyroid blood work is excellent  Continue the synthroid 88 mcg 1 tablet 5 days a week and 1 1/2 tablets 2 days a week  Follow up in 1 year with blood work  Thyroid ultrasound is due now

## 2019-12-09 DIAGNOSIS — E03.9 HYPOTHYROIDISM, UNSPECIFIED TYPE: Primary | ICD-10-CM

## 2019-12-09 RX ORDER — LEVOTHYROXINE SODIUM 88 MCG
TABLET ORAL
Qty: 16 TABLET | Refills: 0 | Status: SHIPPED | OUTPATIENT
Start: 2019-12-09 | End: 2020-03-13 | Stop reason: SDUPTHER

## 2019-12-09 NOTE — TELEPHONE ENCOUNTER
Pt states she dropped her Synthroid down the sink and needs a 2 week supply to hold her over till she is due for a refill  Pt saw Dr Manjula Olson in October and has a 1 year follow up in October 2020

## 2020-01-02 ENCOUNTER — TELEPHONE (OUTPATIENT)
Dept: ENDOCRINOLOGY | Facility: HOSPITAL | Age: 45
End: 2020-01-02

## 2020-01-02 DIAGNOSIS — E55.9 VITAMIN D DEFICIENCY: Primary | ICD-10-CM

## 2020-01-02 NOTE — TELEPHONE ENCOUNTER
Received call from patient  She would like to know how long she needs to take 2000iu of Vitamin D3  She also is questioning whether that is enough or if she should be taking 31977ty weekly  She also wants to know when she should take the vitamin d supplements?

## 2020-01-02 NOTE — TELEPHONE ENCOUNTER
She should take vitamin-D permanently  I would repeat her vitamin-D level around April 2020  She can take the vitamin-D any time of the day as long as she just takes it every day

## 2020-01-13 ENCOUNTER — HOSPITAL ENCOUNTER (OUTPATIENT)
Dept: ULTRASOUND IMAGING | Facility: MEDICAL CENTER | Age: 45
Discharge: HOME | End: 2020-01-13
Payer: COMMERCIAL

## 2020-01-13 DIAGNOSIS — E04.1 THYROID NODULE: ICD-10-CM

## 2020-01-13 DIAGNOSIS — E04.9 GOITER: ICD-10-CM

## 2020-01-13 DIAGNOSIS — E03.8 HYPOTHYROIDISM DUE TO HASHIMOTO'S THYROIDITIS: ICD-10-CM

## 2020-01-13 DIAGNOSIS — E06.3 HYPOTHYROIDISM DUE TO HASHIMOTO'S THYROIDITIS: ICD-10-CM

## 2020-01-13 PROCEDURE — 76536 US EXAM OF HEAD AND NECK: CPT

## 2020-01-19 ENCOUNTER — OFFICE VISIT (OUTPATIENT)
Dept: URGENT CARE | Facility: MEDICAL CENTER | Age: 45
End: 2020-01-19
Payer: COMMERCIAL

## 2020-01-19 VITALS
TEMPERATURE: 98 F | DIASTOLIC BLOOD PRESSURE: 58 MMHG | WEIGHT: 132 LBS | BODY MASS INDEX: 21.99 KG/M2 | RESPIRATION RATE: 18 BRPM | HEART RATE: 84 BPM | SYSTOLIC BLOOD PRESSURE: 121 MMHG | OXYGEN SATURATION: 98 % | HEIGHT: 65 IN

## 2020-01-19 DIAGNOSIS — R07.9 CHEST PAIN, UNSPECIFIED TYPE: ICD-10-CM

## 2020-01-19 DIAGNOSIS — R00.2 PALPITATIONS: Primary | ICD-10-CM

## 2020-01-19 DIAGNOSIS — R11.0 NAUSEA: ICD-10-CM

## 2020-01-19 PROCEDURE — 93005 ELECTROCARDIOGRAM TRACING: CPT | Performed by: PHYSICIAN ASSISTANT

## 2020-01-19 PROCEDURE — G0383 LEV 4 HOSP TYPE B ED VISIT: HCPCS | Performed by: PHYSICIAN ASSISTANT

## 2020-01-19 NOTE — PROGRESS NOTES
[facility]        NAME: Shalonda Granger is a 38 y o  female  : 1975    MRN: [id number]  DATE: 2020  TIME: 9:58 AM    Assessment and Plan   Palpitations [R00 2]  1  Palpitations  ECG 12 lead    Transfer to other facility   2  Chest pain, unspecified type  Transfer to other facility   3  Nausea  Transfer to other facility         Patient Instructions     Follow up with PCP in 3-5 days  Proceed to  ER if symptoms worsen  Chief Complaint     Chief Complaint   Patient presents with    Palpitations     Patient presents with palpitations since last night  She also reports chest tightness, indigestion, and some nausea  She notes that she has a cold with congestion and she is also starting her menstrual cycle  Patient reports that she has a history of costochondritis and has had similar symptoms in the past           History of Present Illness       81-year-old female presents for heart palpitations, chest tightness and nausea since last evening  Patient states she has a history of GERD and costochondritis  She also states she has been under stress due to her daughter having health problems  Patient denies any past medical history of hypertension, diabetes, hyperlipidemia or cardiac issues  Review of Systems   Review of Systems   Constitutional: Negative  HENT: Negative  Eyes: Negative  Respiratory: Negative  Cardiovascular: Positive for chest pain and palpitations  Negative for leg swelling  Gastrointestinal: Positive for nausea  Negative for abdominal pain, diarrhea and vomiting  Skin: Negative  Neurological: Negative  Psychiatric/Behavioral: The patient is nervous/anxious            Current Medications       Current Outpatient Medications:     Fexofenadine HCl (ALLEGRA ALLERGY PO), Take by mouth, Disp: , Rfl:     SYNTHROID 88 MCG tablet, 1 tablet 5 days a week and 1 5 tablets 2 days a week, Disp: 16 tablet, Rfl: 0    cholecalciferol (VITAMIN D3) 1,000 units tablet, Take 2 tablets (2,000 Units total) by mouth daily (Patient not taking: Reported on 1/19/2020), Disp: , Rfl:     Current Allergies     Allergies as of 01/19/2020 - Reviewed 01/19/2020   Allergen Reaction Noted    Sulfa antibiotics Anaphylaxis 11/17/2011    Sulfites Anaphylaxis, Hives, and Shortness Of Breath 09/09/2015    Adhesive  [medical tape] Hives 06/24/2013    Other  01/15/2016    Penicillin g  08/24/2015            The following portions of the patient's history were reviewed and updated as appropriate: allergies, current medications, past family history, past medical history, past social history, past surgical history and problem list     Objective   /58   Pulse 84   Temp 98 °F (36 7 °C) (Tympanic)   Resp 18   Ht 5' 5" (1 651 m)   Wt 59 9 kg (132 lb)   SpO2 98%   BMI 21 97 kg/m²        Physical Exam     Physical Exam   Constitutional: Vital signs are normal  She appears well-developed and well-nourished  No distress  HENT:   Head: Normocephalic and atraumatic  Right Ear: Hearing, tympanic membrane, external ear and ear canal normal    Left Ear: Hearing, tympanic membrane, external ear and ear canal normal    Nose: Nose normal  No mucosal edema or rhinorrhea  Right sinus exhibits no maxillary sinus tenderness and no frontal sinus tenderness  Left sinus exhibits no maxillary sinus tenderness and no frontal sinus tenderness  Mouth/Throat: Uvula is midline, oropharynx is clear and moist and mucous membranes are normal  No oropharyngeal exudate, posterior oropharyngeal edema, posterior oropharyngeal erythema or tonsillar abscesses  Eyes: Conjunctivae and lids are normal    Cardiovascular: Normal rate, regular rhythm and normal heart sounds  No murmur heard  Pulmonary/Chest: Effort normal and breath sounds normal  No respiratory distress  She has no decreased breath sounds  She has no wheezes  She has no rhonchi  She has no rales     No pain with deep breathing   Lymphadenopathy: Head (right side): No submandibular and no tonsillar adenopathy present  Head (left side): No submandibular and no tonsillar adenopathy present  Skin: No rash noted  Nursing note and vitals reviewed

## 2020-01-19 NOTE — PATIENT INSTRUCTIONS
Chest pain, heart palpitations and nausea  Advised patient to go to emergency room for further evaluation and higher level of care  Patient's  will be driving her to [address]

## 2020-01-20 LAB
ATRIAL RATE: 86 BPM
P AXIS: 73 DEGREES
PR INTERVAL: 134 MS
QRS AXIS: 79 DEGREES
QRSD INTERVAL: 82 MS
QT INTERVAL: 376 MS
QTC INTERVAL: 449 MS
T WAVE AXIS: 59 DEGREES
VENTRICULAR RATE: 86 BPM

## 2020-01-20 PROCEDURE — 93010 ELECTROCARDIOGRAM REPORT: CPT | Performed by: INTERNAL MEDICINE

## 2020-02-04 ENCOUNTER — TELEPHONE (OUTPATIENT)
Dept: ENDOCRINOLOGY | Facility: HOSPITAL | Age: 45
End: 2020-02-04

## 2020-02-04 ENCOUNTER — TELEPHONE (OUTPATIENT)
Dept: OTHER | Facility: OTHER | Age: 45
End: 2020-02-04

## 2020-02-04 NOTE — TELEPHONE ENCOUNTER
Fatigue is not a known symptom of vitamin-D deficiency  I would still start the vitamin-D replacement to see if it improves her symptoms  The fatigue may be just recovering from the procedure she and the anesthesia

## 2020-02-04 NOTE — TELEPHONE ENCOUNTER
Received call from patient  She states she had a wisdom tooth removal on Friday 1/31/2020  She notes she has been more fatigued since the procedure  Her PCP did some lab work, which can be seen in [place]  She notes she had not yet started the vitamin d supplementation you suggested  She would like to know if the increased fatigue could be the vitamin D deficiency

## 2020-02-05 DIAGNOSIS — R23.2 FLUSHING: Primary | ICD-10-CM

## 2020-02-05 NOTE — PROGRESS NOTES
Patient concerned about facial flushing after eating chicken and cheese  Patient was under general anesthesia for tooth extraction on Friday  She is currently on amoxicillin  She experiences facial flushing after eating certain foods (chicken and cheese) for the past 2 days  Allergist said it is not allergy, b/c not itching, no hives and no swelling  Patient see allergist for artificial light and pressure allergy - she turns red  I have advised patient to follow up with her primary care physician if symptoms persists after she completes amoxicillin

## 2020-02-05 NOTE — TELEPHONE ENCOUNTER
[facility] @ 1204 TO DR Kristy Simpson   [phone] A/S PT  Cate Peña/1975/URGENT YES/REASON PATIENT IS EXPERIENCING FACIAL FLUSHING AFTER EATING

## 2020-02-05 NOTE — TELEPHONE ENCOUNTER
Her most recent thyroid blood work is normal so that is not the cause of her symptoms  The only other possible cause of flushing is a serotonin producing tumor at which point she would have flushing with diarrhea  This can be ruled out by doing a special 24 hour urine  I can order that if she would like to rule it out

## 2020-02-05 NOTE — TELEPHONE ENCOUNTER
Patient informed  She notes she is not have diarrhea  She does experience nausea  She would like this test ordered  She will call back with the fax number for the [facility] lab she uses

## 2020-02-05 NOTE — TELEPHONE ENCOUNTER
Patient informed  She now notes she called the on call doctor last night  See note from Ted Maria  Patient states she is trying to rule out any endocrine cause of the facial flushing she started experiencing yesterday  She states the facial flushing occurs after salty foods, which has occurred 3-4 times  She notes she had been on amoxicillin since last Wednesday and is stopping it today  She notes she has lost 15-20 lbs since her last visit due to her tooth pain  She did request an "emergency appointment" with you to discuss, I informed her we will call back to further discuss if she needs to be seen

## 2020-02-05 NOTE — TELEPHONE ENCOUNTER
I ordered a 24 hour urine and also some blood work to be done  Please make sure she knows she also needs blood work done since I did not mention that in the prior note

## 2020-02-10 ENCOUNTER — TELEPHONE (OUTPATIENT)
Dept: NEUROLOGY | Facility: CLINIC | Age: 45
End: 2020-02-10

## 2020-02-10 NOTE — TELEPHONE ENCOUNTER
Best contact number for NIDXMJT:[phone]    Emergency Contact name and number:Not Available     Referring provider and telephone number: Dr Lavern Peterson Patient oral Surgeon    Primary Care Provider Name and if affiliated with [facility]: Candace Martin 040     Reason for Appointment/Dx: Flushing of face when eating possible Neurologic origin Per [address] with General Neuro      Neurology Location patient would like to be seen: [facility]iel Vogt received? Yes                                                 Records Received? No    Have you ever seen another Neurologist?       No    Insurance Information    Insurance Name: Shekhar Rodriguez [facility]    ID/Policy #:    Secondary Insurance:    ID/Policy#: Workman's Comp/ Accident/ School  Information      Workman's Comp/Accident/School related?        No    If yes name of Insurance company: No    Date of Injury: No    Type of Injury: No     Name and Telephone Number: No    Notes: Appointment schedule with patient new patient pack sent                    Appointment date: 5- 12:30pm with Dr Hector Purcell

## 2020-03-13 DIAGNOSIS — E03.9 HYPOTHYROIDISM, UNSPECIFIED TYPE: ICD-10-CM

## 2020-03-16 RX ORDER — LEVOTHYROXINE SODIUM 88 MCG
TABLET ORAL
Qty: 100 TABLET | Refills: 0 | Status: SHIPPED | OUTPATIENT
Start: 2020-03-16 | End: 2020-08-24

## 2020-03-18 ENCOUNTER — TELEPHONE (OUTPATIENT)
Dept: ENDOCRINOLOGY | Facility: HOSPITAL | Age: 45
End: 2020-03-18

## 2020-03-18 NOTE — TELEPHONE ENCOUNTER
Patient left a message, she notes that she was recenty in the ER because she choked on some cheerios  She was diagnosed with Esophagitis and has also been having some symptoms of palpitations and other GI issues  She would like to know if you could check her thyroid labs to make sure it is not causing her these issues

## 2020-03-19 ENCOUNTER — TELEPHONE (OUTPATIENT)
Dept: ENDOCRINOLOGY | Facility: HOSPITAL | Age: 45
End: 2020-03-19

## 2020-03-19 DIAGNOSIS — E06.3 HYPOTHYROIDISM DUE TO HASHIMOTO'S THYROIDITIS: Primary | ICD-10-CM

## 2020-03-19 DIAGNOSIS — E03.8 HYPOTHYROIDISM DUE TO HASHIMOTO'S THYROIDITIS: Primary | ICD-10-CM

## 2020-03-19 NOTE — TELEPHONE ENCOUNTER
The Rolaids or Tums have calcium in them and it may interfere with the Synthroid absorption so she should wait at least 3-4 hours to take those medicines, they can't be within 3-4 hours of when she takes her Synthroid

## 2020-03-19 NOTE — TELEPHONE ENCOUNTER
Patient would like you to know that her facial flushing when eating stopped when she discontinued the Amoxicillin

## 2020-03-19 NOTE — TELEPHONE ENCOUNTER
Pt l/m stating that she has not started doing the half tablet 2 days a week of her Synthroid because she has Esophagitis and she is worried about the half tablet scratching her throat  She is also asking if she can take Rolaids or Tums with her Synthroid or if she has to wait for a certain amount of time before she takes it?

## 2020-03-20 ENCOUNTER — TELEPHONE (OUTPATIENT)
Dept: ENDOCRINOLOGY | Facility: HOSPITAL | Age: 45
End: 2020-03-20

## 2020-03-20 NOTE — TELEPHONE ENCOUNTER
Pt aware  She was prescribed Pepcid  Does she need to wait a certain amount of time after she takes her Thyroid med to take that?

## 2020-03-20 NOTE — TELEPHONE ENCOUNTER
Pt had her Thyroid labs draw yesterday  The lab is faxing them over but her TSH was 1 80 and her Free T4 is 1 53  Pt states she has elevated heart rate, fatigue and muscle pain and sleeplessness  She is wondering if her dose needs to be adjusted  She is also wondering if her choking episode and trauma to the throat would trigger her nodules?

## 2020-03-20 NOTE — TELEPHONE ENCOUNTER
Based on her thyroid blood work results which were normal, her symptoms have nothing to do with her thyroid  A choking episode in trauma in her throat will not do anything to her thyroid or her thyroid nodules

## 2020-03-27 ENCOUNTER — TELEPHONE (OUTPATIENT)
Dept: ENDOCRINOLOGY | Facility: HOSPITAL | Age: 45
End: 2020-03-27

## 2020-03-27 NOTE — TELEPHONE ENCOUNTER
Spoke with kristen  She notes she has been on a soft food diet due to esophagitis for 2 weeks  She notes the past 2 days she has frequent urination, anxiety, lightheaded and oily skin  She notes she is concerned thesey symptoms are related to hypoglycemia  She did buy a glucose monitor, but has not started testing  She states in the past she has issues with hypoglycemia  Should she test her blood sugar, when and how often?

## 2020-03-27 NOTE — TELEPHONE ENCOUNTER
She should test blood sugars if she gets these symptoms to see if low  If they are not low, then it is not hypoglycemia

## 2020-03-27 NOTE — TELEPHONE ENCOUNTER
Patient left voicemail she has concerns regarding hypoglycemia  She notes she is not testing her blood sugar  She has been experiencing dry mouth and fatigue  Left message for call back

## 2020-04-01 ENCOUNTER — TELEPHONE (OUTPATIENT)
Dept: DIABETES SERVICES | Facility: HOSPITAL | Age: 45
End: 2020-04-01

## 2020-05-14 ENCOUNTER — TELEPHONE (OUTPATIENT)
Dept: NEUROLOGY | Facility: CLINIC | Age: 45
End: 2020-05-14

## 2020-05-15 ENCOUNTER — TELEPHONE (OUTPATIENT)
Dept: NEUROLOGY | Facility: CLINIC | Age: 45
End: 2020-05-15

## 2020-05-18 ENCOUNTER — TELEMEDICINE (OUTPATIENT)
Dept: NEUROLOGY | Facility: CLINIC | Age: 45
End: 2020-05-18
Payer: COMMERCIAL

## 2020-05-18 ENCOUNTER — TELEPHONE (OUTPATIENT)
Dept: NEUROLOGY | Facility: CLINIC | Age: 45
End: 2020-05-18

## 2020-05-18 VITALS — HEIGHT: 65 IN | BODY MASS INDEX: 17.83 KG/M2 | WEIGHT: 107 LBS

## 2020-05-18 DIAGNOSIS — R23.2 FACIAL FLUSHING: Primary | ICD-10-CM

## 2020-05-18 PROCEDURE — 99202 OFFICE O/P NEW SF 15 MIN: CPT | Performed by: PSYCHIATRY & NEUROLOGY

## 2020-05-19 ENCOUNTER — TELEPHONE (OUTPATIENT)
Dept: NEUROLOGY | Facility: CLINIC | Age: 45
End: 2020-05-19

## 2020-06-04 ENCOUNTER — TELEPHONE (OUTPATIENT)
Dept: ENDOCRINOLOGY | Facility: HOSPITAL | Age: 45
End: 2020-06-04

## 2020-06-04 DIAGNOSIS — E03.8 HYPOTHYROIDISM DUE TO HASHIMOTO'S THYROIDITIS: Primary | ICD-10-CM

## 2020-06-04 DIAGNOSIS — E06.3 HYPOTHYROIDISM DUE TO HASHIMOTO'S THYROIDITIS: Primary | ICD-10-CM

## 2020-06-05 ENCOUNTER — TELEPHONE (OUTPATIENT)
Dept: ENDOCRINOLOGY | Facility: HOSPITAL | Age: 45
End: 2020-06-05

## 2020-06-08 ENCOUNTER — TELEPHONE (OUTPATIENT)
Dept: ENDOCRINOLOGY | Facility: HOSPITAL | Age: 45
End: 2020-06-08

## 2020-06-09 DIAGNOSIS — E03.8 HYPOTHYROIDISM DUE TO HASHIMOTO'S THYROIDITIS: Primary | ICD-10-CM

## 2020-06-09 DIAGNOSIS — E06.3 HYPOTHYROIDISM DUE TO HASHIMOTO'S THYROIDITIS: Primary | ICD-10-CM

## 2020-06-09 RX ORDER — LEVOTHYROXINE SODIUM 75 MCG
75 TABLET ORAL DAILY
Qty: 30 TABLET | Refills: 3 | Status: SHIPPED | OUTPATIENT
Start: 2020-06-09 | End: 2020-10-06

## 2020-08-05 ENCOUNTER — TELEPHONE (OUTPATIENT)
Dept: ENDOCRINOLOGY | Facility: HOSPITAL | Age: 45
End: 2020-08-05

## 2020-08-05 NOTE — TELEPHONE ENCOUNTER
Patient concerned that she's still not gaining weight, even though you decreased her Synthroid dose  Is still ranging between 104-107 lbs  Is consuming 4674-0876 calories a day  Wondering if there might be something else going on with her metabolism or is it just her thyroid  Asked if there was any other thyroid blood work you could order in addition to the TSH & Free T4 she's having done in about 2 weeks  Patient knows that you won't be back till next week  Please let her know what your thoughts are on this  Patient requested that we fax her lab slip to the hospitals in [place] (fax [phone])  Did not do so yet  Waiting to see if any additional tests are added

## 2020-08-06 DIAGNOSIS — R63.4 WEIGHT LOSS: ICD-10-CM

## 2020-08-06 DIAGNOSIS — E06.3 HYPOTHYROIDISM DUE TO HASHIMOTO'S THYROIDITIS: ICD-10-CM

## 2020-08-06 DIAGNOSIS — E03.8 HYPOTHYROIDISM DUE TO HASHIMOTO'S THYROIDITIS: ICD-10-CM

## 2020-08-06 DIAGNOSIS — R23.2 FLUSHING: Primary | ICD-10-CM

## 2020-08-06 NOTE — TELEPHONE ENCOUNTER
Patient would also like to know when she should have her vitamin D level retested  She has not been taking her vitamin d that Dr Nathaly Upton suggested previously?

## 2020-08-06 NOTE — TELEPHONE ENCOUNTER
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

## 2020-08-06 NOTE — TELEPHONE ENCOUNTER
Ordered labs in this regard including repeat thyroid function  The cortisol level should be done in the morning fasting between 7 and 9:00 a m  Dara Soulier

## 2020-08-07 ENCOUNTER — TELEPHONE (OUTPATIENT)
Dept: ENDOCRINOLOGY | Facility: HOSPITAL | Age: 45
End: 2020-08-07

## 2020-08-07 DIAGNOSIS — E55.9 VITAMIN D DEFICIENCY: Primary | ICD-10-CM

## 2020-08-07 NOTE — TELEPHONE ENCOUNTER
Patient would like to know if she should take her thyroid medication before her testing this weekend?

## 2020-08-07 NOTE — TELEPHONE ENCOUNTER
Received call back from patient  She researched PTH and she C/O the following symptoms: heartburn, loss of interest in activities, thinning hair, depression, tingling of the hands, urination, frequent urination

## 2020-08-11 ENCOUNTER — TRANSCRIBE ORDERS (OUTPATIENT)
Dept: ADMINISTRATIVE | Facility: HOSPITAL | Age: 45
End: 2020-08-11

## 2020-08-11 DIAGNOSIS — N63.20 LUMP OF BREAST, LEFT: Primary | ICD-10-CM

## 2020-08-11 NOTE — TELEPHONE ENCOUNTER
Received call from patient regarding lab work completed on 8/8/20  The ACTH is still pending  Patient noted the following concerns about results  1 ) is her cortisol level normal  2 ) her TSH is higher than previous, she did miss 3 pills in the past 2 months, could this be from the recent weight loss  3 ) she has not taken her vitamin D supplement as suggested  She had been drink 4-5 glasses of Horizon milk daily  Does she need the vitamin D supplement? 4 ) she has concerns about her elevated platelets  5 ) her psychiatrist  has suggested she be tested for systemic mastocytosis  6 ) she found a lump on her breast, she will be getting a mammogram, she wants to know if this could be a lymph node concern    Would you prefer she schedule an appointment?

## 2020-08-11 NOTE — TELEPHONE ENCOUNTER
Her cortisol level is high normal likely due to stress  A  Low level would cause weight loss  The calcium and parathyroid levels are normal  Her liver, kidney, salt and water balance are normal  The vitamin D level is low  The Horizon milk is not enough and she should take vitamin D 1000 units daily  Her thyroid levels are under active, the few doses missed are not causing this  What dose is she taking again? I do not address the platelets or mastocytosis, that would be her PCp to evaluate

## 2020-08-12 NOTE — TELEPHONE ENCOUNTER
Spoke with patient  She is currently taking levothyroxine 75mcg daily  She is unable to break tablets in half due to esophagitis  She would like your advise on why her levels made such a change in 2 months  She would like to know if you have any suggestions for a endocrine cause for her weight loss and inability to gain weight

## 2020-08-12 NOTE — TELEPHONE ENCOUNTER
Is she on any vitamins, iron, or calcium within 3 hours of her levothyroxine dosage? This could cause her TSH to elevate  At this point, I would leave her levothyroxine at the same dosage of 75 mcg daily and repeat her blood work in 6-8 weeks to see if it require lobe rates back to its normal   Her weight loss and inability to gain weight are not related to any endocrine causes as we have already ruled them all out  I would guess it might be related to her esophagitis or any other medicines as she is on

## 2020-08-14 DIAGNOSIS — E03.8 HYPOTHYROIDISM DUE TO HASHIMOTO'S THYROIDITIS: Primary | ICD-10-CM

## 2020-08-14 DIAGNOSIS — E06.3 HYPOTHYROIDISM DUE TO HASHIMOTO'S THYROIDITIS: Primary | ICD-10-CM

## 2020-08-18 ENCOUNTER — HOSPITAL ENCOUNTER (OUTPATIENT)
Dept: ULTRASOUND IMAGING | Facility: CLINIC | Age: 45
Discharge: HOME | End: 2020-08-18
Payer: COMMERCIAL

## 2020-08-18 ENCOUNTER — HOSPITAL ENCOUNTER (OUTPATIENT)
Dept: MAMMOGRAPHY | Facility: CLINIC | Age: 45
Discharge: HOME | End: 2020-08-18
Payer: COMMERCIAL

## 2020-08-18 ENCOUNTER — TELEPHONE (OUTPATIENT)
Dept: SURGICAL ONCOLOGY | Facility: CLINIC | Age: 45
End: 2020-08-18

## 2020-08-18 VITALS — TEMPERATURE: 96.3 F

## 2020-08-18 VITALS — HEIGHT: 65 IN | BODY MASS INDEX: 17.49 KG/M2 | WEIGHT: 105 LBS

## 2020-08-18 DIAGNOSIS — N63.20 LUMP OF BREAST, LEFT: ICD-10-CM

## 2020-08-18 PROCEDURE — G0279 TOMOSYNTHESIS, MAMMO: HCPCS

## 2020-08-18 PROCEDURE — 77066 DX MAMMO INCL CAD BI: CPT

## 2020-08-18 PROCEDURE — 76642 ULTRASOUND BREAST LIMITED: CPT

## 2020-08-18 NOTE — TELEPHONE ENCOUNTER
New Patient Encounter    New Patient Intake Form   Patient Details:  Carmenza Munoz  1975  [id number]    Background Information:  [address] starts by opening a telephone encounter and gathering the following information   Who is calling to schedule? If not self, relationship to patient? self   Referring Provider [facility]   What is the diagnosis? Ovarian mass   Is this diagnosis confirmed? Yes   When was the diagnosis? 8/18   Is there a confirmed diagnosis from a biopsy/tissue reviewed by pathology? na   Is patient aware of diagnosis? Yes   Is there a personal history and what kind? na   Is there a family history and what kind? na   Reason for visit? New Diagnosis   Have you had any imaging or labs done? If so: when, where? yes  [facility]   Are records in [employer]? yes   Was the patient told to bring a disk? no   Does the patient smoke or Vape? no   If yes, how many packs or cartridges per day? na   Scheduling Information:   Preferred [place]:  [facility]     Are there any dates/time the patient cannot be seen? na   Miscellaneous: na   After completing the above information, please route to Financial Counselor and the appropriate Nurse Navigator for review

## 2020-08-21 ENCOUNTER — TELEPHONE (OUTPATIENT)
Dept: GYNECOLOGIC ONCOLOGY | Facility: CLINIC | Age: 45
End: 2020-08-21

## 2020-08-21 NOTE — TELEPHONE ENCOUNTER
Called patient to go over screening questions for appointment on 8/24  Left message for patient to call back

## 2020-08-21 NOTE — TELEPHONE ENCOUNTER
Pt has an active [employer] plan effective 02/16/19  Pt has a pcp co-pay $20  specialist co-pay $45  ER co-pay $200    called pt to go over that info got her voicemail  Cheryle Marie left message for pt to call me back

## 2020-08-24 ENCOUNTER — CONSULT (OUTPATIENT)
Dept: GYNECOLOGIC ONCOLOGY | Facility: CLINIC | Age: 45
End: 2020-08-24
Payer: COMMERCIAL

## 2020-08-24 VITALS — TEMPERATURE: 98.3 F | HEIGHT: 65 IN | WEIGHT: 103 LBS | BODY MASS INDEX: 17.16 KG/M2

## 2020-08-24 DIAGNOSIS — N83.202 CYST OF LEFT OVARY: Primary | ICD-10-CM

## 2020-08-24 PROCEDURE — 99214 OFFICE O/P EST MOD 30 MIN: CPT | Performed by: OBSTETRICS & GYNECOLOGY

## 2020-08-24 NOTE — ASSESSMENT & PLAN NOTE
Patient is a 78-year-old very anxious female with a history of persistent intermittent adnexal masses  Her left adnexal mass is increased in size to 5 cm  Her symptoms of abdominal pain and bloating have decreased throughout this time however the patient is worried that she may have a cancer due to her recent 30 lb weight loss  She is not worried enough however to undergo a pelvic exam which she has refused multiple times during this assessment  I have explained to the patient that her evaluation is incomplete refusing the key portion of the exam however her overall risk of an ovarian cancer with normal Genetics for a persistent 5 cm complex adnexal mass in a premenopausal woman is under 5%  We discussed potentially removing the ovary however at this level of risk the patient does not wish to move ahead with surgery given her caregiver requirements for her daughter  I do not think the risk benefit ratio warrants going to surgery at this time however could certainly be discussed  The patient does have a strong family history of gynecologic malignancy  I have asked the patient to be seen by the cancer  to see if testing is warranted  Additionally we will attempt to get the originals of the images as the faxed images of the ultrasound are difficult to read  At that time we will see the patient back  A pelvic exam will be performed, the genetics will be evaluated, and a full assessment will be performed  The patient is in agreement

## 2020-08-24 NOTE — LETTER
August 24, 2020     Paul Leon DO  [address] [facility] [address]    Patient: Neo Saavedra   YOB: 1975   Date of Visit: 8/24/2020       Dear Dr Simon Vazquez: Thank you for referring Neo Saavedra to me for evaluation  Below are my notes for this consultation  If you have questions, please do not hesitate to call me  I look forward to following your patient along with you  Sincerely,        Gabe Rogers MD        CC: MD Gabe Rangel MD  8/24/2020  4:33 PM  Sign when Signing Visit  Assessment/Plan:    Problem List Items Addressed This Visit        Endocrine    Cyst of left ovary - Primary     Patient is a 78-year-old very anxious female with a history of persistent intermittent adnexal masses  Her left adnexal mass is increased in size to 5 cm  Her symptoms of abdominal pain and bloating have decreased throughout this time however the patient is worried that she may have a cancer due to her recent 30 lb weight loss  She is not worried enough however to undergo a pelvic exam which she has refused multiple times during this assessment  I have explained to the patient that her evaluation is incomplete refusing the key portion of the exam however her overall risk of an ovarian cancer with normal Genetics for a persistent 5 cm complex adnexal mass in a premenopausal woman is under 5%  We discussed potentially removing the ovary however at this level of risk the patient does not wish to move ahead with surgery given her caregiver requirements for her daughter  I do not think the risk benefit ratio warrants going to surgery at this time however could certainly be discussed  The patient does have a strong family history of gynecologic malignancy  I have asked the patient to be seen by the cancer  to see if testing is warranted    Additionally we will attempt to get the originals of the images as the faxed images of the ultrasound are difficult to read  At that time we will see the patient back  A pelvic exam will be performed, the genetics will be evaluated, and a full assessment will be performed  The patient is in agreement  Relevant Orders    Ambulatory Referral to Oncology Genetics              CHIEF COMPLAINT:  Complex adnexal mass on ultrasound January 2020        Patient ID: Prachi Emerson is a 41 y o  female  One patient is seen in consultation from [address] regarding evaluation and management of pelvic ultras abnormality of left ovary  Patient was originally seen by Dr Lauren Avelar and followed for ovarian cysts  The patient initially had symptoms of abdominal pain bloating and nausea  The pelvic ultrasound revealed intermittent ovarian cysts and the patient underwent ultrasounds every 4-6 months  DXr Micha Perez parted ways with [facility] and the patient arranged for follow-up at [facility]  A subsequent ultrasound was reperformed last week  Pelvic ultrasound in January of 2020 revealed the following: There are multiple myomas which appear intramural and stable         There is a complex predominantly cystic mass in the left ovary, likely a complicated cyst   Hypoechoic nodule in the left ovary is likely also a complicated cyst although by appearance endometrioma is difficult to exclude   Cystic masses in the left ovary are changing compared to previous studies which suggests complicated cysts which come and go      The patient did have an ultrasound performed approximately 6 months prior which revealed only dominant follicles within the ovaries  The fibroids were still present  Patient underwent follow-up ultrasound on 08/18/2020  This revealed persistent fibroid uterus with 3 fibroids measuring approximately 2 cm each  Additionally the right ovary showed a 1 8 cm cyst with internal echoes  The left ovary was 5 cm ovarian mass with internal echoes        The patient's symptoms of abdominal pain have improved  She has however lost 30 lb while maintaining a diet with calories between 1500-2,000 per day  Her menstrual cycle has been regular  Her TSH has been abnormal, initially abnormally high now initially low  She still has intermittent nausea  The patient's family history is notable for maternal grandmother with multiple gyn malignancies in her 45s however she was apparently cured and lived to [de-identified]  Her maternal aunt was tested for genetic mutation and did not have any  Her paternal grandmother  of endometrial cancer  There are several other family members with breast cancer  The patient is very worried about her cancer risk  She is also worried about maintaining the health of her teen daughter who was recently diagnosed with SVT  She would have surgery if needed but would prefer not to  The patient does have some significant urinary urge without significant incontinence  She does not have significant bowel problems with the exception of nausea  Her pain is improved from last year  She has undergone  testing in 2019 and this was  25 per the patient report  Review of Systems   Constitutional: Positive for activity change, appetite change, fatigue and unexpected weight change  HENT: Positive for dental problem, trouble swallowing and voice change  Eyes: Positive for redness  Respiratory: Negative  Cardiovascular: Positive for chest pain and palpitations  Gastrointestinal: Positive for nausea  Endocrine: Negative  Genitourinary: Positive for urgency  Musculoskeletal: Negative  Skin: Negative  Allergic/Immunologic: Positive for environmental allergies and food allergies  Neurological: Positive for dizziness, syncope, weakness and light-headedness  Hematological: Negative  Psychiatric/Behavioral: The patient is nervous/anxious          Current Outpatient Medications   Medication Sig Dispense Refill    cholecalciferol (VITAMIN D3) 1,000 units tablet Take 2 tablets (2,000 Units total) by mouth daily      Fexofenadine HCl (ALLEGRA ALLERGY PO) Take by mouth      SYNTHROID 75 MCG tablet Take 1 tablet (75 mcg total) by mouth daily 30 tablet 3     No current facility-administered medications for this visit  Allergies   Allergen Reactions    Sulfa Antibiotics Anaphylaxis     Anaphylaxis    Sulfites Anaphylaxis, Hives and Shortness Of Breath    Adhesive  [Medical Tape] Hives    Other      Fluorescent and halogen lights    Penicillin G        Past Medical History:   Diagnosis Date    Costochondritis     Fibroid, uterine     Ovarian cyst     Seasonal allergies     Vitamin D deficiency        History reviewed  No pertinent surgical history  OB History        2    Para   2    Term   2            AB        Living           SAB        TAB        Ectopic        Multiple        Live Births                     Family History   Problem Relation Age of Onset    Hypertension Mother     Mitral valve prolapse Mother     Heart disease Father     Hyperlipidemia Father     Hypothyroidism Paternal Aunt     Abdominal aortic aneurysm Paternal Aunt     Cancer Maternal Grandmother     Hypothyroidism Maternal Grandmother     No Known Problems Sister     No Known Problems Daughter     Breast cancer Other     No Known Problems Maternal Aunt        The following portions of the patient's history were reviewed and updated as appropriate: allergies, current medications, past family history, past social history, past surgical history and problem list       Objective:    Temperature 98 3 °F (36 8 °C), temperature source Tympanic, height 5' 5" (1 651 m), weight 46 7 kg (103 lb)  Body mass index is 17 14 kg/m²  Physical Exam  Constitutional:       Appearance: She is well-developed  Comments: Markedly anxious wearing Ziploc bags on her hands   HENT:      Head: Normocephalic and atraumatic     Eyes:      Pupils: Pupils are equal, round, and reactive to light  Neck:      Musculoskeletal: Normal range of motion and neck supple  Cardiovascular:      Rate and Rhythm: Normal rate and regular rhythm  Heart sounds: Normal heart sounds  Pulmonary:      Effort: Pulmonary effort is normal  No respiratory distress  Breath sounds: Normal breath sounds  Abdominal:      General: Bowel sounds are normal  There is no distension  Palpations: Abdomen is soft  Abdomen is not rigid  Tenderness: There is no abdominal tenderness  There is no guarding or rebound  Genitourinary:     Comments: Refused by patient  Musculoskeletal: Normal range of motion  Lymphadenopathy:      Cervical: No cervical adenopathy  Upper Body:      Right upper body: No supraclavicular adenopathy  Left upper body: No supraclavicular adenopathy  Skin:     General: Skin is warm and dry  Neurological:      Mental Status: She is alert and oriented to person, place, and time

## 2020-08-24 NOTE — PROGRESS NOTES
Assessment/Plan:    Problem List Items Addressed This Visit        Endocrine    Cyst of left ovary - Primary     Patient is a 27-year-old very anxious female with a history of persistent intermittent adnexal masses  Her left adnexal mass is increased in size to 5 cm  Her symptoms of abdominal pain and bloating have decreased throughout this time however the patient is worried that she may have a cancer due to her recent 30 lb weight loss  She is not worried enough however to undergo a pelvic exam which she has refused multiple times during this assessment  I have explained to the patient that her evaluation is incomplete refusing the key portion of the exam however her overall risk of an ovarian cancer with normal Genetics for a persistent 5 cm complex adnexal mass in a premenopausal woman is under 5%  We discussed potentially removing the ovary however at this level of risk the patient does not wish to move ahead with surgery given her caregiver requirements for her daughter  I do not think the risk benefit ratio warrants going to surgery at this time however could certainly be discussed  The patient does have a strong family history of gynecologic malignancy  I have asked the patient to be seen by the cancer  to see if testing is warranted  Additionally we will attempt to get the originals of the images as the faxed images of the ultrasound are difficult to read  At that time we will see the patient back  A pelvic exam will be performed, the genetics will be evaluated, and a full assessment will be performed  The patient is in agreement  Relevant Orders    Ambulatory Referral to Oncology Genetics              CHIEF COMPLAINT:  Complex adnexal mass on ultrasound January 2020        Patient ID: Vita Larsen is a 41 y o  female  One patient is seen in consultation from [address] [facility] regarding evaluation and management of pelvic ultras abnormality of left ovary      Patient was originally seen by Dr Nena Rodriguez and followed for ovarian cysts  The patient initially had symptoms of abdominal pain bloating and nausea  The pelvic ultrasound revealed intermittent ovarian cysts and the patient underwent ultrasounds every 4-6 months  Slim Perez parted ways with [facility] and the patient arranged for follow[facility]up at [facility]  A subsequent ultrasound was reperformed last week  Pelvic ultrasound in 2020 revealed the following: There are multiple myomas which appear intramural and stable         There is a complex predominantly cystic mass in the left ovary, likely a complicated cyst   Hypoechoic nodule in the left ovary is likely also a complicated cyst although by appearance endometrioma is difficult to exclude   Cystic masses in the left ovary are changing compared to previous studies which suggests complicated cysts which come and go      The patient did have an ultrasound performed approximately 6 months prior which revealed only dominant follicles within the ovaries  The fibroids were still present  Patient underwent follow-up ultrasound on 2020  This revealed persistent fibroid uterus with 3 fibroids measuring approximately 2 cm each  Additionally the right ovary showed a 1 8 cm cyst with internal echoes  The left ovary was 5 cm ovarian mass with internal echoes  The patient's symptoms of abdominal pain have improved  She has however lost 30 lb while maintaining a diet with calories between 1500-2,000 per day  Her menstrual cycle has been regular  Her TSH has been abnormal, initially abnormally high now initially low  She still has intermittent nausea  The patient's family history is notable for maternal grandmother with multiple gyn malignancies in her 45s however she was apparently cured and lived to [de-identified]  Her maternal aunt was tested for genetic mutation and did not have any  Her paternal grandmother  of endometrial cancer    There are several other family members with breast cancer  The patient is very worried about her cancer risk  She is also worried about maintaining the health of her teen daughter who was recently diagnosed with SVT  She would have surgery if needed but would prefer not to  The patient does have some significant urinary urge without significant incontinence  She does not have significant bowel problems with the exception of nausea  Her pain is improved from last year  She has undergone  testing in November of 2019 and this was  25 per the patient report  Review of Systems   Constitutional: Positive for activity change, appetite change, fatigue and unexpected weight change  HENT: Positive for dental problem, trouble swallowing and voice change  Eyes: Positive for redness  Respiratory: Negative  Cardiovascular: Positive for chest pain and palpitations  Gastrointestinal: Positive for nausea  Endocrine: Negative  Genitourinary: Positive for urgency  Musculoskeletal: Negative  Skin: Negative  Allergic/Immunologic: Positive for environmental allergies and food allergies  Neurological: Positive for dizziness, syncope, weakness and light-headedness  Hematological: Negative  Psychiatric/Behavioral: The patient is nervous/anxious  Current Outpatient Medications   Medication Sig Dispense Refill    cholecalciferol (VITAMIN D3) 1,000 units tablet Take 2 tablets (2,000 Units total) by mouth daily      Fexofenadine HCl (ALLEGRA ALLERGY PO) Take by mouth      SYNTHROID 75 MCG tablet Take 1 tablet (75 mcg total) by mouth daily 30 tablet 3     No current facility-administered medications for this visit          Allergies   Allergen Reactions    Sulfa Antibiotics Anaphylaxis     Anaphylaxis    Sulfites Anaphylaxis, Hives and Shortness Of Breath    Adhesive  [Medical Tape] Hives    Other      Fluorescent and halogen lights    Penicillin G        Past Medical History: Diagnosis Date    Costochondritis     Fibroid, uterine     Ovarian cyst     Seasonal allergies     Vitamin D deficiency        History reviewed  No pertinent surgical history  OB History        2    Para   2    Term   2            AB        Living           SAB        TAB        Ectopic        Multiple        Live Births                     Family History   Problem Relation Age of Onset    Hypertension Mother     Mitral valve prolapse Mother     Heart disease Father     Hyperlipidemia Father     Hypothyroidism Paternal Aunt     Abdominal aortic aneurysm Paternal Aunt     Cancer Maternal Grandmother     Hypothyroidism Maternal Grandmother     No Known Problems Sister     No Known Problems Daughter     Breast cancer Other     No Known Problems Maternal Aunt        The following portions of the patient's history were reviewed and updated as appropriate: allergies, current medications, past family history, past social history, past surgical history and problem list       Objective:    Temperature 98 3 °F (36 8 °C), temperature source Tympanic, height 5' 5" (1 651 m), weight 46 7 kg (103 lb)  Body mass index is 17 14 kg/m²  Physical Exam  Constitutional:       Appearance: She is well-developed  Comments: Markedly anxious wearing Ziploc bags on her hands   HENT:      Head: Normocephalic and atraumatic  Eyes:      Pupils: Pupils are equal, round, and reactive to light  Neck:      Musculoskeletal: Normal range of motion and neck supple  Cardiovascular:      Rate and Rhythm: Normal rate and regular rhythm  Heart sounds: Normal heart sounds  Pulmonary:      Effort: Pulmonary effort is normal  No respiratory distress  Breath sounds: Normal breath sounds  Abdominal:      General: Bowel sounds are normal  There is no distension  Palpations: Abdomen is soft  Abdomen is not rigid  Tenderness: There is no abdominal tenderness   There is no guarding or rebound  Genitourinary:     Comments: Refused by patient  Musculoskeletal: Normal range of motion  Lymphadenopathy:      Cervical: No cervical adenopathy  Upper Body:      Right upper body: No supraclavicular adenopathy  Left upper body: No supraclavicular adenopathy  Skin:     General: Skin is warm and dry  Neurological:      Mental Status: She is alert and oriented to person, place, and time

## 2020-08-25 ENCOUNTER — TELEPHONE (OUTPATIENT)
Dept: GYNECOLOGIC ONCOLOGY | Facility: CLINIC | Age: 45
End: 2020-08-25

## 2020-08-26 ENCOUNTER — TELEPHONE (OUTPATIENT)
Dept: GYNECOLOGIC ONCOLOGY | Facility: CLINIC | Age: 45
End: 2020-08-26

## 2020-08-26 ENCOUNTER — TELEPHONE (OUTPATIENT)
Dept: SURGICAL ONCOLOGY | Facility: CLINIC | Age: 45
End: 2020-08-26

## 2020-08-26 DIAGNOSIS — N83.202 CYST OF LEFT OVARY: Primary | ICD-10-CM

## 2020-08-26 NOTE — TELEPHONE ENCOUNTER
[facility] does not have ability to send physical images of patient's recent u/s  D/w Dr Shayne Huntley, who requests that ultrasound be performed again and read by radiologist  Order placed  LMOM for patient to call back so that we may schedule this for her

## 2020-08-26 NOTE — TELEPHONE ENCOUNTER
Called Dr Yuridia Luke office to request a physical copy (not a scanned copy) of the in-house U/S they did on 8/17/20 per Dr Chuckie Almeidar request  Please have them mail it to our office if possible

## 2020-08-26 NOTE — TELEPHONE ENCOUNTER
Left message for patient to schedule her pelvic US per Alex White  Left our office phone number and central scheduling phone number to get it scheduled  Informed patient to call office back with any concerns

## 2020-08-28 ENCOUNTER — TELEPHONE (OUTPATIENT)
Dept: SURGICAL ONCOLOGY | Facility: CLINIC | Age: 45
End: 2020-08-28

## 2020-08-28 NOTE — TELEPHONE ENCOUNTER
Left several messages for pt to schedule with genetics  She has our phone number should she wish to schedule appt

## 2020-08-31 ENCOUNTER — TELEPHONE (OUTPATIENT)
Dept: MAMMOGRAPHY | Facility: CLINIC | Age: 45
End: 2020-08-31

## 2020-08-31 NOTE — TELEPHONE ENCOUNTER
Spoke with patient regarding Dr Norma Johnson recommendations (after she spoke with Dr Norma Johnson)  Patient states that she is willing to have additional mammogram, us biopsy and sterotactic biopsy  Patient provided days in which she would be able to have procedure done  Email sent to patient on email in demographics regarding stereotactic biopsy and ultrasound guided biopsy  Patient remains concerned regarding anesthesia and biopsy clip that would be utilized, stating she may not want either if that is an option  Will review with leads and notify patient  Will call her back with tentative date/time

## 2020-08-31 NOTE — TELEPHONE ENCOUNTER
[employer] ins & spoke to bry call ref# [id number]  pts plan rund on a cal year    deduct $400 met out of pocket $8150 met $9365 33  [employer] [place] in pt hospitalization & out pt surgery are covered 100% since the out of pocket is met  [employer] [place] genetic testing is covered 100% only if it is medically necessary if not then the pt would be resp for the full cost called the pt to go over the benefits but got her voicemail  [employer] [place] left her a message to call me back  [employer] [place]

## 2020-09-02 ENCOUNTER — TELEPHONE (OUTPATIENT)
Dept: MAMMOGRAPHY | Facility: CLINIC | Age: 45
End: 2020-09-02

## 2020-09-03 ENCOUNTER — TELEPHONE (OUTPATIENT)
Dept: MAMMOGRAPHY | Facility: CLINIC | Age: 45
End: 2020-09-03

## 2020-09-08 ENCOUNTER — TELEPHONE (OUTPATIENT)
Dept: GYNECOLOGIC ONCOLOGY | Facility: CLINIC | Age: 45
End: 2020-09-08

## 2020-09-08 NOTE — PROGRESS NOTES
Pre-Test Genetic Counseling Consult Note    Patient Name: Austyn MERCEDES/Age: /25 y o  Referring Provider: Reginald Sandifer, MD    Date of Service: 2020  Genetic Counselor: Paul Ford MS, [facility]  Interpretation Services: None   Location: Telephone consult   Length of Visit: 60 minutes      Cate was referred to the [address] [place] [address] and Genetic Assessment Program due to her family history of breast and gynocologic cancers  she presents today to discuss the possibility of a hereditary cancer syndrome, options for genetic testing, and implications for her and her family  Cancer History and Treatment:   Personal History: no personal history of cancer    Gyn Hx:  , menarche 7, AFLB 27, premenopausal, ovaries/uterus intact, OCPs none    Screening Hx:   Mammogram (): IMPRESSION:  Ultrasound-guided core biopsy of the right breast and potential stereotactic core biopsy of the right breast      ASSESSMENT/BI-RADS CATEGORY:  Right: 4A - Low Suspicion for Malignancy  Overall: 4 - Suspicious     RECOMMENDATION:       - Ultrasound-guided breast biopsy for the right breast        - Diagnostic mammogram at the current time for the right breast        - Obtain prior studies for both breasts      Previous screening: 10 years ago, normal, was referred to a breast surgeon in her 25s for density, x mammo scheduled on  for calcifications  Clinical breast exam annually as of recent, normal per patient   Breast biopsy: none recalled   Pelvic/Pap exam on 19, normal per OB note  Has had prior positive HPV screen  Colonoscopy: none  Skin cancer screening mole removed 20 years ago from back, malignant; mole check 2-3 years ago found mole on calf; recently at dermatologist- mole removed on calf that looked 'different' waiting on results  Other screening: 15y ago, thyroid nodules, inconclusive          Nodules checked in 2020, normal    Medical and Surgical History  Pertinent surgical history: No past surgical history on file  Pertinent medical history:  Past Medical History:   Diagnosis Date    Costochondritis     Fibroid, uterine     Ovarian cyst     Seasonal allergies     Vitamin D deficiency          Other History:  Height:   Ht Readings from Last 1 Encounters:   08/24/20 5' 5" (1 651 m)     Weight:   Wt Readings from Last 1 Encounters:   08/24/20 46 7 kg (103 lb)       Relevant Family History   Patient reports no Ashkenazi Judaism ancestry  Cate's maternal grandmother was diagnosed with uterine, ovarian, and colon cancer in her 45s at the same time  Cate reports that it was unclear which was the primary, but her grandmother lived into her [de-identified] after completing treatment  Her maternal grandfather was diagnosed with bladder cancer in his 62s and his sister (Cate's maternal great aunt) was diagnosed with breast cancer in her 62s  Cate's maternal aunt recently underwent testing in July and was reportedly negative for [facility] CancerNext panel  Though I didn't have a copy of the report, the patient read it to me over the phone  Cate's father passed away at 62 due to sudden cardiac arrest  His sister was diagnosed with breast cancer in her 62s and now at 76 is in remission  Her paternal grandmother was diagnosed with uterine cancer in her 62s  Please refer to the scanned pedigree in the Media Tab for a complete family history     *All history is reported as provided by the patient; records are not available for review, except where indicated  Assessment:  We discussed sporadic, familial and hereditary cancer  We also discussed the many factors that influence our risk for cancer such as age, environmental exposures, lifestyle choices and family history  We reviewed the indications suggestive of a hereditary predisposition to cancer      Genetic testing is indicated for Cate based on the following criteria: Meets NCCN V1 2020 Testing Criteria for the Evaluation of Polanco syndrome: ?3 first-degree or second-degree relatives with LS-related cancers, regardless of age  [de-identified]  family history raises some suspicion for a cancer predisposition syndrome  Both sides include breast and uterine cancer, as well as colon, ovarian, and bladder on her maternal side  Though this type of history isn't striking for Polanco syndrome and her aunt had negative Polanco testing, genetic testing may provide an answer for her family history  The risks, benefits, and limitations of genetic testing were reviewed with the patient, as well as genetic discrimination laws, and possible test results (positive, negative, variants of uncertain significance) and their clinical implications  If positive for a mutation, options for managing cancer risk including increased surveillance, chemoprevention, and in some cases prophylactic surgery were discussed  Cate was informed that if a hereditary cancer syndrome was identified in her, first degree relatives (parents, siblings, and children) have a chance of also inheriting the condition  Genetic testing would allow for predictive genetic testing in other relatives, who may also be at risk depending on their degree of relation  Plan: We submitted a preverification to the lab  Based on the response from insurance the patient would like to proceed with testing  Summary:     The pre-verification process will take about one week to review  Once the lab contacts us we will inform Cate of the cost and review options

## 2020-09-08 NOTE — TELEPHONE ENCOUNTER
Patient returned my call regarding scheduling genetic testing  She had questions regarding her referral and I explained to her that we typically are referred patients with some family history of cancer to see if we can find an underlying genetic cause  The patient was concerned about cost so I reviewed that we currently aren't billing for genetic counseling  I asked for her family history which she provided and assessed her qualifications for [employer] coverage  I explained that from first glance it doesn't look like she would be covered, but we are always able to submit a pre-auth to [employer] and see what they say  She also inquired about her maternal aunt's recent genetic testing which was negative  Her aunt told her mother that based on these results she did not have to worry and I reviewed the concept of an uninformed negative with the patient  I asked her to obtain a copy of her aunt's results prior to our visit  I also reviewed that there is significant history on her paternal side which wouldn't be explained by her aunt's result  I reviewed [facility] billing policy as this is likely the lab I will send her to since they are able to hold testing until the patient has approved any out of pocket cost  She asked if her mother would be a better candidate for testing in terms of insurance coverage and I informed her that this is likely, but we are still able to test her  The patient is anxious about COVID exposure and asked for a televisit  I assured her that this was an option and we'd be able to send her a kit in the mail following our appointment  She was concerned about exposure in the common space of her apartment, and would prefer that she go to a lab to have her blood drawn  I explained to her that typically for telehealth we send kits home, but we can try and figure something out tomorrow so she is comfortable    The patient asked about her personal risk based on this referral  She was concerned that being referred for genetics meant she was in a high risk category  When she went for her mammogram, the Tyrer-Gabrielk model was ran and gave her a ~13% lifetime risk for breast cancer  I assured her that her family history is a very typical referral we see and we see families with both less and more family histories of cancer  I also reviewed that the average woman's lieftime risk to develop cancer is around 12%, so based on this calculation she is not at significantly higher risk than the general population  I told her I would rerun the model with the information she gave me  I reviewed the benefits of genetic testing and counseling with her  She was interested in speaking with me and I was able to schedule her for an appointment tomorrow via telephone

## 2020-09-08 NOTE — TELEPHONE ENCOUNTER
Called patient to schedule her for a genetic counseling appointment and left a message  I let her know that the genetic counseling appointment would be at no cost, that testing is optional, and for the most part patient's do not pay more than $250 out of pocket for testing if they are interested  I explained that cost would be dependent on whether or not she qualified under [employer] for genetic testing and things like her co-pay, co-insurance, deductible etc  I gave her my phone number to call back if she is interested in scheduling with us

## 2020-09-09 ENCOUNTER — CONSULT (OUTPATIENT)
Dept: GYNECOLOGIC ONCOLOGY | Facility: CLINIC | Age: 45
End: 2020-09-09

## 2020-09-09 DIAGNOSIS — Z80.41 FAMILY HISTORY OF OVARIAN CANCER: ICD-10-CM

## 2020-09-09 DIAGNOSIS — Z80.49 FAMILY HISTORY OF MALIGNANT NEOPLASM OF ENDOMETRIUM: ICD-10-CM

## 2020-09-09 DIAGNOSIS — Z80.0 FAMILY HISTORY OF COLON CANCER: ICD-10-CM

## 2020-09-09 DIAGNOSIS — Z80.3 FAMILY HISTORY OF BREAST CANCER: Primary | ICD-10-CM

## 2020-09-09 PROCEDURE — NC001 PR NO CHARGE: Performed by: GENETIC COUNSELOR, MS

## 2020-09-17 ENCOUNTER — TELEPHONE (OUTPATIENT)
Dept: GYNECOLOGIC ONCOLOGY | Facility: CLINIC | Age: 45
End: 2020-09-17

## 2020-09-17 ENCOUNTER — TELEPHONE (OUTPATIENT)
Dept: ENDOCRINOLOGY | Facility: HOSPITAL | Age: 45
End: 2020-09-17

## 2020-09-17 NOTE — TELEPHONE ENCOUNTER
Pt wanted you to know that she still weighs 105lbs  She is being checked for an Ovarian Cyst and also a nodule in her right breast  She wants to know if there is anything else from endocrine stand that would be causing her weight issues?

## 2020-09-17 NOTE — TELEPHONE ENCOUNTER
I called Cate and left a voicemail to let her know that the pre-verification from [facility] came back and there will be 0 out of pocket cost to her  I instructed her to give us a call back if she was interested in testing

## 2020-09-17 NOTE — TELEPHONE ENCOUNTER
Pt called back and states she forgot to say she has been experiencing hand fulls of hair loss every time she stover her hair

## 2020-09-17 NOTE — TELEPHONE ENCOUNTER
Unfortunately, we have looked for every endocrine problem that could be causing these symptoms which she does not have  There is nothing else that I can look for

## 2020-09-22 ENCOUNTER — HOSPITAL ENCOUNTER (OUTPATIENT)
Dept: MAMMOGRAPHY | Facility: CLINIC | Age: 45
Discharge: HOME | End: 2020-09-22

## 2020-09-22 ENCOUNTER — TELEPHONE (OUTPATIENT)
Dept: MAMMOGRAPHY | Facility: CLINIC | Age: 45
End: 2020-09-22

## 2020-09-22 ENCOUNTER — HOSPITAL ENCOUNTER (OUTPATIENT)
Dept: MAMMOGRAPHY | Facility: CLINIC | Age: 45
Discharge: HOME | End: 2020-09-22
Payer: COMMERCIAL

## 2020-09-22 ENCOUNTER — HOSPITAL ENCOUNTER (OUTPATIENT)
Dept: ULTRASOUND IMAGING | Facility: CLINIC | Age: 45
Discharge: HOME | End: 2020-09-22

## 2020-09-22 VITALS — WEIGHT: 103 LBS | HEIGHT: 65 IN | BODY MASS INDEX: 17.16 KG/M2

## 2020-09-22 DIAGNOSIS — R92.8 ABNORMAL MAMMOGRAM: ICD-10-CM

## 2020-09-22 PROCEDURE — 77065 DX MAMMO INCL CAD UNI: CPT

## 2020-09-22 RX ORDER — LIDOCAINE HYDROCHLORIDE 10 MG/ML
5 INJECTION, SOLUTION EPIDURAL; INFILTRATION; INTRACAUDAL; PERINEURAL ONCE
Status: DISCONTINUED | OUTPATIENT
Start: 2020-09-22 | End: 2020-09-23 | Stop reason: HOSPADM

## 2020-09-24 NOTE — TELEPHONE ENCOUNTER
I called patient and left a message regarding her OOP of $0 for genetic testing and advised her to call our office at [phone]

## 2020-10-02 ENCOUNTER — HOSPITAL ENCOUNTER (OUTPATIENT)
Dept: ULTRASOUND IMAGING | Facility: MEDICAL CENTER | Age: 45
Discharge: HOME | End: 2020-10-02
Payer: COMMERCIAL

## 2020-10-02 DIAGNOSIS — N83.202 CYST OF LEFT OVARY: ICD-10-CM

## 2020-10-02 PROCEDURE — 76830 TRANSVAGINAL US NON-OB: CPT

## 2020-10-02 PROCEDURE — 76856 US EXAM PELVIC COMPLETE: CPT

## 2020-10-06 DIAGNOSIS — E06.3 HYPOTHYROIDISM DUE TO HASHIMOTO'S THYROIDITIS: ICD-10-CM

## 2020-10-06 DIAGNOSIS — E03.8 HYPOTHYROIDISM DUE TO HASHIMOTO'S THYROIDITIS: ICD-10-CM

## 2020-10-06 RX ORDER — LEVOTHYROXINE SODIUM 75 MCG
TABLET ORAL
Qty: 30 TABLET | Refills: 3 | Status: SHIPPED | OUTPATIENT
Start: 2020-10-06 | End: 2020-11-13 | Stop reason: SDUPTHER

## 2020-10-07 NOTE — TELEPHONE ENCOUNTER
Final Attempt - I called patient and left message regarding her OOP for genetic testing of $ 0 00   I asked patient to call our office at [phone] if she wished to move forward with genetic testing and we can arrange for a kit to be mailed to her

## 2020-10-12 ENCOUNTER — TELEPHONE (OUTPATIENT)
Dept: HEMATOLOGY ONCOLOGY | Facility: CLINIC | Age: 45
End: 2020-10-12

## 2020-11-03 ENCOUNTER — DOCUMENTATION (OUTPATIENT)
Dept: GENETICS | Facility: CLINIC | Age: 45
End: 2020-11-03

## 2020-11-09 ENCOUNTER — TELEPHONE (OUTPATIENT)
Dept: ENDOCRINOLOGY | Facility: HOSPITAL | Age: 45
End: 2020-11-09

## 2020-11-09 DIAGNOSIS — E03.8 HYPOTHYROIDISM DUE TO HASHIMOTO'S THYROIDITIS: Primary | ICD-10-CM

## 2020-11-09 DIAGNOSIS — E06.3 HYPOTHYROIDISM DUE TO HASHIMOTO'S THYROIDITIS: Primary | ICD-10-CM

## 2020-11-09 RX ORDER — LEVOTHYROXINE SODIUM 88 MCG
88 TABLET ORAL DAILY
Qty: 30 TABLET | Refills: 5 | Status: SHIPPED | OUTPATIENT
Start: 2020-11-09 | End: 2021-12-10

## 2020-11-13 DIAGNOSIS — E03.8 HYPOTHYROIDISM DUE TO HASHIMOTO'S THYROIDITIS: Primary | ICD-10-CM

## 2020-11-13 DIAGNOSIS — E06.3 HYPOTHYROIDISM DUE TO HASHIMOTO'S THYROIDITIS: Primary | ICD-10-CM

## 2020-11-13 DIAGNOSIS — E04.1 THYROID NODULE: ICD-10-CM

## 2020-11-13 RX ORDER — LEVOTHYROXINE SODIUM 75 MCG
75 TABLET ORAL DAILY
Qty: 90 TABLET | Refills: 1 | Status: SHIPPED | OUTPATIENT
Start: 2020-11-13 | End: 2020-12-08 | Stop reason: SDUPTHER

## 2020-12-08 ENCOUNTER — TELEMEDICINE (OUTPATIENT)
Dept: ENDOCRINOLOGY | Facility: HOSPITAL | Age: 45
End: 2020-12-08
Payer: COMMERCIAL

## 2020-12-08 DIAGNOSIS — E04.9 GOITER: ICD-10-CM

## 2020-12-08 DIAGNOSIS — E04.1 THYROID NODULE: ICD-10-CM

## 2020-12-08 DIAGNOSIS — E06.3 HYPOTHYROIDISM DUE TO HASHIMOTO'S THYROIDITIS: Primary | ICD-10-CM

## 2020-12-08 DIAGNOSIS — E03.8 HYPOTHYROIDISM DUE TO HASHIMOTO'S THYROIDITIS: Primary | ICD-10-CM

## 2020-12-08 DIAGNOSIS — E55.9 VITAMIN D DEFICIENCY: ICD-10-CM

## 2020-12-08 PROCEDURE — 99214 OFFICE O/P EST MOD 30 MIN: CPT | Performed by: INTERNAL MEDICINE

## 2020-12-08 RX ORDER — LEVOTHYROXINE SODIUM 75 MCG
75 TABLET ORAL DAILY
Qty: 30 TABLET | Refills: 6 | Status: SHIPPED | OUTPATIENT
Start: 2020-12-08 | End: 2021-02-26 | Stop reason: SDUPTHER

## 2020-12-24 DIAGNOSIS — E03.8 HYPOTHYROIDISM DUE TO HASHIMOTO'S THYROIDITIS: ICD-10-CM

## 2020-12-24 DIAGNOSIS — E06.3 HYPOTHYROIDISM DUE TO HASHIMOTO'S THYROIDITIS: ICD-10-CM

## 2020-12-29 RX ORDER — LEVOTHYROXINE SODIUM 88 MCG
TABLET ORAL
Qty: 32 TABLET | Refills: 5 | OUTPATIENT
Start: 2020-12-29

## 2021-02-26 DIAGNOSIS — E06.3 HYPOTHYROIDISM DUE TO HASHIMOTO'S THYROIDITIS: ICD-10-CM

## 2021-02-26 DIAGNOSIS — E03.8 HYPOTHYROIDISM DUE TO HASHIMOTO'S THYROIDITIS: ICD-10-CM

## 2021-02-26 RX ORDER — LEVOTHYROXINE SODIUM 75 MCG
75 TABLET ORAL DAILY
Qty: 30 TABLET | Refills: 6 | Status: SHIPPED | OUTPATIENT
Start: 2021-02-26 | End: 2022-01-20 | Stop reason: SDUPTHER

## 2021-04-06 DIAGNOSIS — Z23 ENCOUNTER FOR IMMUNIZATION: ICD-10-CM

## 2021-04-22 ENCOUNTER — TELEPHONE (OUTPATIENT)
Dept: ENDOCRINOLOGY | Facility: HOSPITAL | Age: 46
End: 2021-04-22

## 2021-04-22 NOTE — TELEPHONE ENCOUNTER
Patient left a voice message and asked to talk to you  She stated that she had pancakes around 9 this morning and then a short time later, she started to have tingling inn her hands and legs with some twitching  She then drank some milk and cookies and felt better  Shelvy Mingle

## 2021-04-23 NOTE — TELEPHONE ENCOUNTER
Call patient  She did not answer her phone  I left a message and told her I would try calling her again next week

## 2021-04-26 ENCOUNTER — TELEPHONE (OUTPATIENT)
Dept: OTHER | Facility: OTHER | Age: 46
End: 2021-04-26

## 2021-04-26 NOTE — TELEPHONE ENCOUNTER
Called patient  She did not answer her phone  I left her another message telling her I would try again later this week

## 2021-04-26 NOTE — TELEPHONE ENCOUNTER
Pt stated she has been playing a bit of phone tag with her doctor and she keeps missing the call  She asked if the doctor could call her before 11am tomorrow 4/27/2021 as she does not have anything work related at that time

## 2021-04-27 NOTE — TELEPHONE ENCOUNTER
Patient left a message stating that she can talk to you today after 430, tomorrow-anytime before 11, 1-3 and 4-5    She wants to talk to you about possibly having Type 2 DM

## 2021-04-28 NOTE — TELEPHONE ENCOUNTER
At this point, I would recommend making an appointment as I can not guarantee I can call her in these time frames depending on my patient schedule and hospital coverage

## 2021-04-30 ENCOUNTER — OFFICE VISIT (OUTPATIENT)
Dept: ENDOCRINOLOGY | Facility: HOSPITAL | Age: 46
End: 2021-04-30
Payer: COMMERCIAL

## 2021-04-30 VITALS
HEIGHT: 65 IN | BODY MASS INDEX: 16.66 KG/M2 | WEIGHT: 100 LBS | SYSTOLIC BLOOD PRESSURE: 102 MMHG | HEART RATE: 102 BPM | DIASTOLIC BLOOD PRESSURE: 70 MMHG

## 2021-04-30 DIAGNOSIS — E16.2 HYPOGLYCEMIA: ICD-10-CM

## 2021-04-30 DIAGNOSIS — E55.9 VITAMIN D DEFICIENCY: Primary | ICD-10-CM

## 2021-04-30 DIAGNOSIS — R63.4 WEIGHT LOSS: ICD-10-CM

## 2021-04-30 DIAGNOSIS — E06.3 HYPOTHYROIDISM DUE TO HASHIMOTO'S THYROIDITIS: ICD-10-CM

## 2021-04-30 DIAGNOSIS — E03.8 HYPOTHYROIDISM DUE TO HASHIMOTO'S THYROIDITIS: ICD-10-CM

## 2021-04-30 DIAGNOSIS — K90.9 INTESTINAL MALABSORPTION, UNSPECIFIED TYPE: ICD-10-CM

## 2021-04-30 PROCEDURE — 99214 OFFICE O/P EST MOD 30 MIN: CPT | Performed by: PHYSICIAN ASSISTANT

## 2021-04-30 NOTE — PROGRESS NOTES
Vita Larsen 41 y o  female MRN: [id number]    Encounter: [id number]      Assessment/Plan     Assessment: This is a 41y o -year-old female with  Hypothyroidism due to Hashimoto's thyroiditis, history of thyroid nodule, vitamin-D deficiency, and hypoglycemia  Plan:    1  Hypothyroidism due to Hashimoto's thyroiditis:  Most recent thyroid lab work was completed prior to last office visit in December  To rule out any significant abnormalities with thyroid with her concerns, will repeat thyroid lab work to see if adjustment needs to be made to her medication  At this time continue with Synthroid brand 75 mcg daily  2  History of thyroid nodule and goiter:  No compressive next symptoms at this time  Last ultrasound was completed January 2020 which did display goiter and non worrisome thyroid nodules  3  Vitamin-D deficiency:  Is currently taking vitamin-D supplement  Last set of lab work did show low levels of vitamin- D  Will recheck with lab work  4  Hypoglycemia:  At this time I advised her when she is having the symptoms which appear to be hypoglycemia to check her blood sugar with a glucometer to verify whether this is actual hypoglycemia, or possibly a rapid decrease in blood sugar  Her symptoms also could be related to other malabsorption issues, or possible adrenal insufficiency  Will check vitamin panel, celiac, cortisol, and iron panel to rule out any specific causes  Strongly encourage her to follow-up with GI for further evaluation of possible malabsorption issues  CC:   Hypothyroid follow-up    History of Present Illness     HPI:  Vita Larsen is a 41 y o  female with a history of hypothyroidism due to Hashimoto's thyroiditis with thyroid nodule and goiter along with vitamin-D deficiency for follow-up visit via telephone telemedicine      She was diagnosed with hypothyroidism around 4730-5389  She had an episode of transient hyperthyroidism but then developed permanent hypothyroidism  She is currently on Synthroid brand 75 mcg daily  Dose of thyroid hormone was decreased over the last year due to weight loss and hyperthyroid symptoms  She continues to have some weight struggles  Weight used to be about 108 lb or so but now her weight has been struggling to stay above 100 lb and usually is around 102 lb  With concerns of the pandemic, she has found herself losing more weight  Weight today was 100 lb  She has to eat 2000 calories a day and a lot of high fat foods just to maintain this  She also has been trying to increase protein intake  She has hair loss but no dry skin or brittle nails  She always feels cold particularly of her legs  She denies heat intolerance, tremors, or palpitations  She denies insomnia, diarrhea or constipation  She does have concerns with possible hypoglycemic symptoms  Specifically she has been having numbness and tingling in bilateral hands and around her mouth  Sometimes she will experience symptoms in the legs  She will feel weak and fatigued during these episodes  She states that at that time she will have some milk and cookies, and that she will feel better  She was previously checking her blood sugar on a routine basis, but has not been doing this at all  Denies shaking blood sugar when she is having the symptoms  She is concern that she may have diabetes      She has a history of thyroid nodules and goiter  The thyroid nodules were biopsied in 2004 or 2005 and were inconclusive but she chose not to do surgery and has been followed with serial ultrasounds for thyroid nodule stability in size  Last thyroid ultrasound was January 2020 with 1 small subcentimeter thyroid nodule in the setting of a mild goiter  She has no compressive thyroid symptoms or difficulties with swallowing      She has vitamin-D deficiency and takes vitamin-D 2000 units daily  She has not been very consistently taking this vitamin-D recently      She has been having some perioral paresthesias  Review of Systems   Constitutional: Positive for fatigue and unexpected weight change  Negative for activity change, appetite change and diaphoresis  HENT: Negative for sore throat, trouble swallowing and voice change  Eyes: Negative for visual disturbance  Respiratory: Negative for chest tightness and shortness of breath  Cardiovascular: Negative for chest pain, palpitations and leg swelling  Gastrointestinal: Negative for abdominal pain, constipation and diarrhea  Endocrine: Negative for cold intolerance, heat intolerance, polydipsia, polyphagia and polyuria  Skin: Negative for rash  Neurological: Positive for weakness, light-headedness and numbness  Negative for dizziness, tremors and headaches  Hematological: Negative for adenopathy  Psychiatric/Behavioral: Negative for dysphoric mood and sleep disturbance  The patient is nervous/anxious  Historical Information   Past Medical History:   Diagnosis Date    Costochondritis     Fibroid, uterine     Ovarian cyst     Seasonal allergies     Vitamin D deficiency      History reviewed  No pertinent surgical history    Social History   Social History     Substance and Sexual Activity   Alcohol Use No     Social History     Substance and Sexual Activity   Drug Use No     Social History     Tobacco Use   Smoking Status Never Smoker   Smokeless Tobacco Never Used     Family History:   Family History   Problem Relation Age of Onset    Hypertension Mother     Mitral valve prolapse Mother     Heart disease Father     Hyperlipidemia Father     Hypothyroidism Paternal Aunt     Abdominal aortic aneurysm Paternal [de-identified]     Breast cancer Paternal Aunt         dx 62s    Hypothyroidism Maternal Grandmother     Cancer Maternal Grandmother         uterine, ovarian, colon, primary unclear in 45s    No Known Problems Sister     Supraventricular tachycardia Daughter     Heart disease Daughter         Saldivar disease    Cancer Maternal Grandfather         bladder cancer in 62s    Uterine cancer Paternal Grandmother         onset in 62s    Breast cancer Other         dx 62s    No Known Problems Maternal Aunt        Meds/Allergies   Current Outpatient Medications   Medication Sig Dispense Refill    Fexofenadine HCl (ALLEGRA ALLERGY PO) Take by mouth      Synthroid 75 MCG tablet Take 1 tablet (75 mcg total) by mouth daily 30 tablet 6    cholecalciferol (VITAMIN D3) 1,000 units tablet Take 2 tablets (2,000 Units total) by mouth daily (Patient not taking: Reported on 11/16/2020)      Synthroid 88 MCG tablet Take 1 tablet (88 mcg total) by mouth daily (Patient not taking: Reported on 11/16/2020) 30 tablet 5     No current facility-administered medications for this visit  Allergies   Allergen Reactions    Sulfites - Food Allergy Anaphylaxis, Hives and Shortness Of Breath    Adhesive  [Medical Tape] Hives    Other      Fluorescent and halogen lights    Penicillin G        Objective   Vitals: Blood pressure 102/70, pulse 102, height 5' 5" (1 651 m), weight 45 4 kg (100 lb)  Physical Exam  Vitals signs and nursing note reviewed  Constitutional:       General: She is not in acute distress  Appearance: She is underweight  She is not diaphoretic  HENT:      Head: Normocephalic and atraumatic  Eyes:      General: No scleral icterus  Extraocular Movements: Extraocular movements intact  Conjunctiva/sclera: Conjunctivae normal       Pupils: Pupils are equal, round, and reactive to light  Neck:      Musculoskeletal: Normal range of motion  Cardiovascular:      Rate and Rhythm: Normal rate and regular rhythm  Heart sounds: No murmur  Pulmonary:      Effort: Pulmonary effort is normal  No respiratory distress  Breath sounds: Normal breath sounds  No wheezing  Musculoskeletal:      Right lower leg: No edema  Left lower leg: No edema  Lymphadenopathy:      Cervical: No cervical adenopathy  Neurological:      Mental Status: She is alert and oriented to person, place, and time  Mental status is at baseline  Sensory: No sensory deficit  Psychiatric:         Mood and Affect: Mood normal          Behavior: Behavior normal          Thought Content: Thought content normal          The history was obtained from the review of the chart, patient  Lab Results:        Imaging Studies:   Results for orders placed during the hospital encounter of 01/13/20   US thyroid    Impression Heterogeneous thyroid gland concerning for chronic thyroiditis  Diminished size of echogenic right lower pole nodule  No nodule meets current ACR criteria for requiring biopsy or followup ultrasounds  Reference: ACR Thyroid Imaging, Reporting and Data System (TI-RADS): White Paper of the [employer]  J AM Sondra Radiol 0796;58:563-592  (additional recommendations based on American Thyroid Association 2015 guidelines )      Workstation performed: [id number]         I have personally reviewed pertinent reports  Portions of the record may have been created with voice recognition software  Occasional wrong word or "sound a like" substitutions may have occurred due to the inherent limitations of voice recognition software  Read the chart carefully and recognize, using context, where substitutions have occurred

## 2021-05-01 LAB — HBA1C MFR BLD HPLC: 5.8 %

## 2021-05-18 ENCOUNTER — TELEPHONE (OUTPATIENT)
Dept: NEUROLOGY | Facility: CLINIC | Age: 46
End: 2021-05-18

## 2021-05-18 NOTE — TELEPHONE ENCOUNTER
Pt is scheduled for 5/28/21 with Piyush Pelletier  Previously saw Dr Mahsa Youngblood 5/18/20  Pt left voicemail stating she wanted to pass along a message to Sonaldiane Liyah as to why she is coming in  Pt states she has been experiencing tingling and pain in her fingers and hands  States she also previously had twitching underneath her eyes and had lost feeling in her hands  States twitching then moved to her hands  Pt states she was found to have hypoglycemia and sees an endocrinologist  Pt reports her symptoms seemed to have improved however her endocrinologist wanted her to be seen by neurology again  Pt added that she is having "phantom smells" and sees things in her peripheral vision  [phone]    Just an FYI

## 2021-05-18 NOTE — TELEPHONE ENCOUNTER
Cate hobbs on 5/18 @ 9:12a - patient would like to schedule followup appt - she is a former Dr Dayo Lara patient - please schedule with AP Lucio Henderson for followup  Please assist there are various availabilities on 5/19 and 5/24 - please assist if still available

## 2021-06-07 ENCOUNTER — HOSPITAL ENCOUNTER (OUTPATIENT)
Dept: ULTRASOUND IMAGING | Facility: CLINIC | Age: 46
Discharge: HOME | End: 2021-06-07
Payer: COMMERCIAL

## 2021-06-07 ENCOUNTER — HOSPITAL ENCOUNTER (OUTPATIENT)
Dept: MAMMOGRAPHY | Facility: CLINIC | Age: 46
Discharge: HOME | End: 2021-06-07
Payer: COMMERCIAL

## 2021-06-07 VITALS — WEIGHT: 100 LBS | BODY MASS INDEX: 16.66 KG/M2 | HEIGHT: 65 IN

## 2021-06-07 DIAGNOSIS — R92.8 ABNORMAL MAMMOGRAM: ICD-10-CM

## 2021-06-07 PROCEDURE — 77066 DX MAMMO INCL CAD BI: CPT

## 2021-06-07 PROCEDURE — 76642 ULTRASOUND BREAST LIMITED: CPT

## 2021-06-07 PROCEDURE — G0279 TOMOSYNTHESIS, MAMMO: HCPCS

## 2021-06-08 NOTE — PROGRESS NOTES
Met with patient and Dr Sean Andrews  regarding recommendation for;    __x___ RIGHT ______LEFT      _____Ultrasound guided  ____x__Stereotactic breast biopsy  __X___Verbalized understanding        Blood thinners:  _____yes __x___no    Date stopped: ___N/A____    Biopsy teaching sheet given:  __X___yes ____no    Pt given contact information and adv to call with any questions/needs

## 2021-06-11 ENCOUNTER — TELEPHONE (OUTPATIENT)
Dept: MAMMOGRAPHY | Facility: CLINIC | Age: 46
End: 2021-06-11

## 2021-06-16 ENCOUNTER — HOSPITAL ENCOUNTER (OUTPATIENT)
Dept: MAMMOGRAPHY | Facility: CLINIC | Age: 46
Discharge: HOME | End: 2021-06-16

## 2021-06-25 ENCOUNTER — TELEPHONE (OUTPATIENT)
Dept: NEUROLOGY | Facility: CLINIC | Age: 46
End: 2021-06-25

## 2021-07-14 ENCOUNTER — TELEPHONE (OUTPATIENT)
Dept: OTHER | Facility: OTHER | Age: 46
End: 2021-07-14

## 2021-07-14 NOTE — TELEPHONE ENCOUNTER
Patient stated she needs to cancel her new patient in-person appointment for this morning at 9 am as she is about 1 hour away and has a delivery coming to her house today that she cannot miss  She will need to reschedule; she also inquired about the possibility of making the appointment a telephone call or virtual appointment  I advised her to call the office in approx 15 mins or so to speak with office staff regarding rescheduling

## 2021-07-15 ENCOUNTER — TELEMEDICINE (OUTPATIENT)
Dept: GASTROENTEROLOGY | Facility: CLINIC | Age: 46
End: 2021-07-15
Payer: COMMERCIAL

## 2021-07-15 VITALS — WEIGHT: 96 LBS | BODY MASS INDEX: 15.99 KG/M2 | HEIGHT: 65 IN

## 2021-07-15 DIAGNOSIS — R63.4 WEIGHT LOSS: Primary | ICD-10-CM

## 2021-07-15 DIAGNOSIS — E61.1 IRON DEFICIENCY: ICD-10-CM

## 2021-07-15 DIAGNOSIS — Z80.0 FAMILY HISTORY OF COLON CANCER: ICD-10-CM

## 2021-07-15 PROCEDURE — 99204 OFFICE O/P NEW MOD 45 MIN: CPT | Performed by: INTERNAL MEDICINE

## 2021-07-15 NOTE — PROGRESS NOTES
Virtual Regular Visit    Verification of patient location:    Patient is currently located in the state [facility]  Patient is currently located in a state in which I am licensed    Assessment/Plan:    Problem List Items Addressed This Visit     None      Visit Diagnoses     Weight loss    -  Primary    Iron deficiency        Family history of colon cancer            -patient is a 28-year-old female with a 35 lb weight loss over the course of about 14 months  Associated with decreased iron saturation  No localizing GI symptoms but she does have a family history of colon cancer in her grandmother who said her grandmother was diagnosed in her 45s with stage IV colon cancer  The differential diagnosis is rather long here and it is quite possible this is not a primary GI problem however given her age, family history and iron deficiency I would recommend upper and lower endoscopy  I answered many questions from her regarding alternative test I do not think a colon guard her stool test would be a good alternative for checking the lower GI tract and an ulcer which could explain a lot of her symptoms could be diagnosed by an upper GI endoscopy but gastritis would be missed  She wants to think about this and will consider having the test done  I answered all of her questions  Reason for visit is   Chief Complaint   Patient presents with   [facility]  loss, 35lbs    Virtual Regular Visit        Encounter provider Sarah Velázquez MD    Provider located at [address] [address] [phone]      Recent Visits  No visits were found meeting these conditions    Showing recent visits within past 7 days and meeting all other requirements  Today's Visits  Date Type Provider Dept   07/15/21 Telemedicine Sarah Velázquez MD [facility]   Showing today's visits and meeting all other requirements  Future Appointments  No visits were found meeting these conditions  Showing future appointments within next 150 days and meeting all other requirements       The patient was identified by name and date of birth  Aziza Kraus was informed that this is a telemedicine visit and that the visit is being conducted through [address] [place] [address] [facility] and patient was informed that this is a secure, HIPAA-compliant platform  She agrees to proceed     My office door was closed  No one else was in the room  She acknowledged consent and understanding of privacy and security of the video platform  The patient has agreed to participate and understands they can discontinue the visit at any time  Patient is aware this is a billable service  Lauren Griffin is a 40 y o  female who reports that since March of 2020 she has lost approximately 35 lb  She says this is been rather insidious  I asked her if she thinks this is because she is not eating enough food  She does not think so her appetite is sporadic but when she eats she eats high calorie foods in normal amounts  She denies any significant diarrhea some nausea some heartburn no dysphagia no vomiting  She denies any rectal bleeding  She has symptoms of fatigue and feeling run down but otherwise nothing localizing  Kalyan URIARTE     Past Medical History:   Diagnosis Date    Costochondritis     Fibroid, uterine     Ovarian cyst     Seasonal allergies     Vitamin D deficiency        History reviewed  No pertinent surgical history      Current Outpatient Medications   Medication Sig Dispense Refill    Fexofenadine HCl (ALLEGRA ALLERGY PO) Take by mouth      Synthroid 75 MCG tablet Take 1 tablet (75 mcg total) by mouth daily 30 tablet 6    cholecalciferol (VITAMIN D3) 1,000 units tablet Take 2 tablets (2,000 Units total) by mouth daily (Patient not taking: Reported on 11/16/2020)      Synthroid 88 MCG tablet Take 1 tablet (88 mcg total) by mouth daily (Patient not taking: Reported on 11/16/2020) 30 tablet 5     No current facility-administered medications for this visit  Allergies   Allergen Reactions    Sulfites - Food Allergy Anaphylaxis, Hives and Shortness Of Breath    Adhesive  [Medical Tape] Hives    Other      Fluorescent and halogen lights    Penicillin G        Review of Systems negative except as per the HPI    Video Exam    Vitals:    07/15/21 1404   Weight: 43 5 kg (96 lb)   Height: 5' 5" (1 651 m)       Physical Exam by video awake alert no acute distress skin ENT neuro grossly intact    I spent 30 minutes minutes directly with the patient during this visit    Blade Alvarado verbally agrees to participate in [facility]  Pt is aware that [facility] could be limited without vital signs or the ability to perform a full hands-on physical exam  Cate Bangura understands she or the provider may request at any time to terminate the video visit and request the patient to seek care or treatment in person

## 2021-07-20 ENCOUNTER — TELEPHONE (OUTPATIENT)
Dept: GASTROENTEROLOGY | Facility: CLINIC | Age: 46
End: 2021-07-20

## 2021-07-20 NOTE — TELEPHONE ENCOUNTER
1st call-lvm on cell for pt to sched combo at ENDO ordered from ov 7/15/21  tk has check out sheet yes

## 2021-07-28 ENCOUNTER — NURSE TRIAGE (OUTPATIENT)
Dept: OTHER | Facility: OTHER | Age: 46
End: 2021-07-28

## 2021-07-28 NOTE — TELEPHONE ENCOUNTER
She needs to take synthroid on an empty stomach 30-60 min before meals or 2 hours after a meal and 3-4 hours away from vitamins or supplements  She can switch the timing to the evening if this is easier so she does not have to miss a dose on a day she is not scheduled to

## 2021-07-28 NOTE — TELEPHONE ENCOUNTER
Reason for Disposition   [1] Caller has NON-URGENT medicine question about med that PCP prescribed AND [2] triager unable to answer question    Answer Assessment - Initial Assessment Questions  1  NAME of MEDICATION: "What medicine are you calling about?"      Synthroid   2  QUESTION: "What is your question?" (e g , medication refill, side effect)        I usually skip Sunday to take my synthroid due to my blood sugars being low in the am and needing to eat right away  I woke up today and I feel my blood sugar is low " I am going to skip my synthroid today so I can eat and then take my synthroid on Sunday '    Pt advised to follow up with her PCP/Endo office when they open to discuss her low blood sugar issues in the AM and what she can and cannot take with her synthroid  Pt advised to see if she is able to take her synthroid with juice in the AM to help her BS and then still be able to wait the hour to eat  Pt agreed      Protocols used: [facility]

## 2021-08-10 NOTE — TELEPHONE ENCOUNTER
Dr Javier Griffin was contacted to schedule combo ordered from last ov with no response   Please advise-thank you

## 2021-10-27 ENCOUNTER — OFFICE VISIT (OUTPATIENT)
Dept: GASTROENTEROLOGY | Facility: CLINIC | Age: 46
End: 2021-10-27
Payer: COMMERCIAL

## 2021-10-27 VITALS
HEART RATE: 100 BPM | WEIGHT: 103 LBS | TEMPERATURE: 97.8 F | DIASTOLIC BLOOD PRESSURE: 70 MMHG | SYSTOLIC BLOOD PRESSURE: 102 MMHG | OXYGEN SATURATION: 96 % | HEIGHT: 65 IN | BODY MASS INDEX: 17.16 KG/M2

## 2021-10-27 DIAGNOSIS — R63.4 WEIGHT LOSS, UNINTENTIONAL: Primary | ICD-10-CM

## 2021-10-27 DIAGNOSIS — R11.0 NAUSEA: ICD-10-CM

## 2021-10-27 DIAGNOSIS — K21.9 GASTROESOPHAGEAL REFLUX DISEASE WITHOUT ESOPHAGITIS: ICD-10-CM

## 2021-10-27 DIAGNOSIS — R13.19 ESOPHAGEAL DYSPHAGIA: ICD-10-CM

## 2021-10-27 DIAGNOSIS — E61.1 IRON DEFICIENCY: ICD-10-CM

## 2021-10-27 PROCEDURE — 99204 OFFICE O/P NEW MOD 45 MIN: CPT | Performed by: INTERNAL MEDICINE

## 2021-11-23 ENCOUNTER — TELEPHONE (OUTPATIENT)
Dept: ENDOCRINOLOGY | Facility: HOSPITAL | Age: 46
End: 2021-11-23

## 2021-11-24 DIAGNOSIS — E55.9 VITAMIN D DEFICIENCY: ICD-10-CM

## 2021-11-24 DIAGNOSIS — E03.8 HYPOTHYROIDISM DUE TO HASHIMOTO'S THYROIDITIS: ICD-10-CM

## 2021-11-24 DIAGNOSIS — E04.9 GOITER: ICD-10-CM

## 2021-11-24 DIAGNOSIS — E16.2 HYPOGLYCEMIA: ICD-10-CM

## 2021-11-24 DIAGNOSIS — E06.3 HYPOTHYROIDISM DUE TO HASHIMOTO'S THYROIDITIS: ICD-10-CM

## 2021-11-24 DIAGNOSIS — E04.1 THYROID NODULE: Primary | ICD-10-CM

## 2021-11-30 ENCOUNTER — TELEMEDICINE (OUTPATIENT)
Dept: GASTROENTEROLOGY | Facility: CLINIC | Age: 46
End: 2021-11-30
Payer: COMMERCIAL

## 2021-11-30 DIAGNOSIS — D50.9 IRON DEFICIENCY ANEMIA, UNSPECIFIED IRON DEFICIENCY ANEMIA TYPE: ICD-10-CM

## 2021-11-30 DIAGNOSIS — K21.9 GASTROESOPHAGEAL REFLUX DISEASE WITHOUT ESOPHAGITIS: ICD-10-CM

## 2021-11-30 DIAGNOSIS — R13.19 ESOPHAGEAL DYSPHAGIA: ICD-10-CM

## 2021-11-30 DIAGNOSIS — R63.4 WEIGHT LOSS, UNINTENTIONAL: Primary | ICD-10-CM

## 2021-11-30 DIAGNOSIS — R11.0 NAUSEA: ICD-10-CM

## 2021-11-30 PROCEDURE — 99214 OFFICE O/P EST MOD 30 MIN: CPT | Performed by: INTERNAL MEDICINE

## 2021-12-01 ENCOUNTER — TELEPHONE (OUTPATIENT)
Dept: GASTROENTEROLOGY | Facility: CLINIC | Age: 46
End: 2021-12-01

## 2021-12-03 ENCOUNTER — TELEPHONE (OUTPATIENT)
Dept: GASTROENTEROLOGY | Facility: CLINIC | Age: 46
End: 2021-12-03

## 2021-12-03 DIAGNOSIS — R10.84 GENERALIZED ABDOMINAL PAIN: ICD-10-CM

## 2021-12-03 DIAGNOSIS — R63.4 WEIGHT LOSS: Primary | ICD-10-CM

## 2021-12-03 DIAGNOSIS — R11.0 NAUSEA: ICD-10-CM

## 2021-12-03 PROBLEM — K21.9 GASTROESOPHAGEAL REFLUX DISEASE WITHOUT ESOPHAGITIS: Status: ACTIVE | Noted: 2021-12-03

## 2021-12-03 PROBLEM — R13.19 ESOPHAGEAL DYSPHAGIA: Status: ACTIVE | Noted: 2021-12-03

## 2021-12-03 PROBLEM — D50.9 IRON DEFICIENCY ANEMIA: Status: ACTIVE | Noted: 2021-12-03

## 2021-12-10 ENCOUNTER — TELEMEDICINE (OUTPATIENT)
Dept: ENDOCRINOLOGY | Facility: HOSPITAL | Age: 46
End: 2021-12-10
Payer: COMMERCIAL

## 2021-12-10 DIAGNOSIS — E06.3 HYPOTHYROIDISM DUE TO HASHIMOTO'S THYROIDITIS: Primary | ICD-10-CM

## 2021-12-10 DIAGNOSIS — E04.1 THYROID NODULE: ICD-10-CM

## 2021-12-10 DIAGNOSIS — E03.8 HYPOTHYROIDISM DUE TO HASHIMOTO'S THYROIDITIS: Primary | ICD-10-CM

## 2021-12-10 DIAGNOSIS — R23.2 FACIAL FLUSHING: ICD-10-CM

## 2021-12-10 DIAGNOSIS — E55.9 VITAMIN D DEFICIENCY: ICD-10-CM

## 2021-12-10 DIAGNOSIS — R63.4 WEIGHT LOSS, UNINTENTIONAL: ICD-10-CM

## 2021-12-10 DIAGNOSIS — E04.9 GOITER: ICD-10-CM

## 2021-12-10 DIAGNOSIS — E16.2 HYPOGLYCEMIA: ICD-10-CM

## 2021-12-10 PROCEDURE — 99214 OFFICE O/P EST MOD 30 MIN: CPT | Performed by: INTERNAL MEDICINE

## 2021-12-10 RX ORDER — ERGOCALCIFEROL 1.25 MG/1
CAPSULE ORAL
COMMUNITY
Start: 2021-12-03

## 2021-12-13 ENCOUNTER — TELEPHONE (OUTPATIENT)
Dept: ENDOCRINOLOGY | Facility: HOSPITAL | Age: 46
End: 2021-12-13

## 2021-12-23 ENCOUNTER — TELEPHONE (OUTPATIENT)
Dept: GASTROENTEROLOGY | Facility: CLINIC | Age: 46
End: 2021-12-23

## 2021-12-30 ENCOUNTER — TELEPHONE (OUTPATIENT)
Dept: ENDOCRINOLOGY | Facility: HOSPITAL | Age: 46
End: 2021-12-30

## 2021-12-30 NOTE — TELEPHONE ENCOUNTER
Patient is asking if you can test her for Hyperparathyroidism? She said she has had nausea, depression, fatigued and forgetful  She is wondering if that could be the cause?

## 2021-12-30 NOTE — TELEPHONE ENCOUNTER
She was checked for this in early December as her calcium is normal and that would be high with hyperparathyroidism

## 2022-01-20 DIAGNOSIS — E06.3 HYPOTHYROIDISM DUE TO HASHIMOTO'S THYROIDITIS: Primary | ICD-10-CM

## 2022-01-20 DIAGNOSIS — E03.8 HYPOTHYROIDISM DUE TO HASHIMOTO'S THYROIDITIS: Primary | ICD-10-CM

## 2022-01-20 RX ORDER — LEVOTHYROXINE SODIUM 75 MCG
75 TABLET ORAL DAILY
Qty: 30 TABLET | Refills: 6 | Status: SHIPPED | OUTPATIENT
Start: 2022-01-20 | End: 2022-07-11

## 2022-03-17 ENCOUNTER — TELEPHONE (OUTPATIENT)
Dept: ENDOCRINOLOGY | Facility: HOSPITAL | Age: 47
End: 2022-03-17

## 2022-03-17 NOTE — TELEPHONE ENCOUNTER
Patient called in and stated that she has not started her Vit d 50,000  She is getting another one done by her PCP  She would like to know what labs that you would like to have done before her follow up appointment with you

## 2022-03-23 NOTE — TELEPHONE ENCOUNTER
Spoke with patient  She did start the vitamin d 50,000 on Friday  She states she has been experiencing "pins and needles" throughout her body over the past week  Should would like to know if this needs to be evaluated or if it could be related to the vitamin d deficiency

## 2022-03-23 NOTE — TELEPHONE ENCOUNTER
Vitamin d deficiency can cause this if not replaced due to low calcium   Low thyroid and vitamin b12 deficiency can also cause this

## 2022-03-24 DIAGNOSIS — E03.8 HYPOTHYROIDISM DUE TO HASHIMOTO'S THYROIDITIS: Primary | ICD-10-CM

## 2022-03-24 DIAGNOSIS — E04.9 GOITER: ICD-10-CM

## 2022-03-24 DIAGNOSIS — E04.1 THYROID NODULE: ICD-10-CM

## 2022-03-24 DIAGNOSIS — E55.9 VITAMIN D DEFICIENCY: ICD-10-CM

## 2022-03-24 DIAGNOSIS — R63.4 WEIGHT LOSS, UNINTENTIONAL: ICD-10-CM

## 2022-03-24 DIAGNOSIS — R23.2 FACIAL FLUSHING: ICD-10-CM

## 2022-03-24 DIAGNOSIS — E16.2 HYPOGLYCEMIA: ICD-10-CM

## 2022-03-24 DIAGNOSIS — E06.3 HYPOTHYROIDISM DUE TO HASHIMOTO'S THYROIDITIS: Primary | ICD-10-CM

## 2022-03-24 NOTE — TELEPHONE ENCOUNTER
She should do the blood work I ordered now which included a CMP, CBC, TSH, Free T4, Free T3 and add vitamin b12 level

## 2022-06-06 ENCOUNTER — TELEPHONE (OUTPATIENT)
Dept: GASTROENTEROLOGY | Facility: CLINIC | Age: 47
End: 2022-06-06

## 2022-06-06 NOTE — TELEPHONE ENCOUNTER
Called pt and left message requesting call back for call back to schedule appt with Dr George Richards      ----- Message from Morris Floyd MD sent at 6/3/2022  9:46 PM EDT -----  Regarding: Office visit  Please schedule her for an office visit with me on my next available date

## 2022-07-11 DIAGNOSIS — E03.8 HYPOTHYROIDISM DUE TO HASHIMOTO'S THYROIDITIS: ICD-10-CM

## 2022-07-11 DIAGNOSIS — E06.3 HYPOTHYROIDISM DUE TO HASHIMOTO'S THYROIDITIS: ICD-10-CM

## 2022-07-11 RX ORDER — LEVOTHYROXINE SODIUM 75 MCG
TABLET ORAL
Qty: 90 TABLET | Refills: 3 | Status: SHIPPED | OUTPATIENT
Start: 2022-07-11 | End: 2022-10-10

## 2022-10-10 DIAGNOSIS — E06.3 HYPOTHYROIDISM DUE TO HASHIMOTO'S THYROIDITIS: ICD-10-CM

## 2022-10-10 DIAGNOSIS — E03.8 HYPOTHYROIDISM DUE TO HASHIMOTO'S THYROIDITIS: ICD-10-CM

## 2022-10-10 RX ORDER — LEVOTHYROXINE SODIUM 75 MCG
TABLET ORAL
Qty: 90 TABLET | Refills: 4 | Status: SHIPPED | OUTPATIENT
Start: 2022-10-10

## 2022-11-11 ENCOUNTER — NURSE TRIAGE (OUTPATIENT)
Dept: OTHER | Facility: OTHER | Age: 47
End: 2022-11-11

## 2022-11-11 DIAGNOSIS — K21.9 GASTROESOPHAGEAL REFLUX DISEASE WITHOUT ESOPHAGITIS: Primary | ICD-10-CM

## 2022-11-11 RX ORDER — PANTOPRAZOLE SODIUM 40 MG/1
40 TABLET, DELAYED RELEASE ORAL DAILY
Qty: 30 TABLET | Refills: 0 | Status: SHIPPED | OUTPATIENT
Start: 2022-11-11 | End: 2022-12-11

## 2022-11-11 NOTE — TELEPHONE ENCOUNTER
Regarding: increased burping and feel something in my throat  ----- Message from Shaun Kaur RN sent at 11/11/2022  2:38 PM EST -----  "Im having increased symptoms such as burping  while eating and something feels like its in my throat "

## 2022-11-11 NOTE — TELEPHONE ENCOUNTER
Spoke with patient at great length  Patient is concerned that she will be unable to swallow her Allegra and Synthroid  Advised patient to contact her PCP to see if she is able to hold these medications  Otherwise, she can try alternative ways of taking the medications  Patient stated she has a very strict diet where she eats cookies, cake, [employer], chicken nuggets, and fries  Informed patient to consume a soft diet for now (ex: apple sauce)  Advised her to avoid fried foods, spicy foods, and acidic foods  Recommended trying pantoprazole 40 mg 1x/day  Patient stated she is anxious about trying new medications due to her severe allergies  Medication ordered  Patient stated she will look at the ingredients to see if she would like to try it  In the past, it has been recommended for patient to have EGD, US, and CT  Patient states she is anxious in regards to testing due to her light allergy  I recommended patient to try to get ultrasound completed  Reviewed signs and symptoms of choking  Advised patient to go to the ER if current symptoms persist or if choking occurs  OV made with [facility] 11/16

## 2022-11-11 NOTE — TELEPHONE ENCOUNTER
Reason for Disposition  • Swallowing difficulty and cause unknown (Exception: difficulty swallowing is a chronic symptom)    Answer Assessment - Initial Assessment Questions  1  SYMPTOM: "Are you having difficulty swallowing liquids, solids, or both?"      Water and pills   2  ONSET: "When did the swallowing problems begin?"       Several days ago   3  CAUSE: "What do you think is causing the problem?"      Unsure     5   OTHER SYMPTOMS: "Do you have any other symptoms?" (e g , difficulty breathing, sore throat, swollen tongue, chest pain)     No pain; no fever; Has had this feeling last couple days and burping with eating    Protocols used: SWALLOWING DIFFICULTY-ADULT-OH

## 2022-11-14 ENCOUNTER — TELEPHONE (OUTPATIENT)
Dept: GASTROENTEROLOGY | Facility: CLINIC | Age: 47
End: 2022-11-14

## 2022-11-14 NOTE — TELEPHONE ENCOUNTER
Pt called in to give an update on how she is feeling and would also like to discuss her extreme allergies for her next appt  Please call pt to follow up

## 2022-11-15 ENCOUNTER — TELEPHONE (OUTPATIENT)
Dept: ENDOCRINOLOGY | Facility: HOSPITAL | Age: 47
End: 2022-11-15

## 2022-11-15 NOTE — TELEPHONE ENCOUNTER
She would like the liquid version and she wants it sent to the [facility] on file  She wants to know if it's taken the same way and if there are any special instructions to take it  She again asked what she should do if she goes a couple of days without taking her Synthroid?

## 2022-11-15 NOTE — TELEPHONE ENCOUNTER
I contacted the patient back in regards to her follow up visit for tomorrow and stated that we could accommodate a virtual visit if needed  I left voicemail to call back for confirmation as to how she would like to proceed

## 2022-11-15 NOTE — TELEPHONE ENCOUNTER
I returned patient's phone call in regards to changing her appt for 11/16/22 to a virtual visitt at 11:30  The patient was agreeable and confirmed changes  I advised her to call if anything changes

## 2022-11-15 NOTE — TELEPHONE ENCOUNTER
Tirosint Sol is a liquid form of thyroid hormone  It is a brand name and so more expensive  Otherwise, await the thyroid ultrasound results

## 2022-11-15 NOTE — TELEPHONE ENCOUNTER
Patient is having difficulty swallowing things and she is wondering if it could be from her Thyroid  I advised her she would need to get the Ultrasound done that you ordered to know if it was from her Thyroid  She did schedule a follow up with Jatinder Feng on 11/28  She is having issues swallowing her Synthroid  She wants to know if there is a liquid option and if not what should she do if she continues to have issues with swallowing and can't take her Synthroid?

## 2022-11-15 NOTE — TELEPHONE ENCOUNTER
Spoke with patient at great length  She is concerned about her appointment tomorrow  She has a rare allergy (Solar Uticaria)  She is allergic to artificial lights  She states she may go into anaphylactic shock, but will have her epi-pen with her and will notify staff where it will be placed  Patient prefers that no light be shined into her throat as it can swell up  Patient states her warning signs consist of dizziness, nausea, skin becoming red, and feeling "fuzzy"  Patient states she will not be on her allergy medications tomorrow, and it will be her first time off of it in years therefore she is not sure how her body will react  Patient also is concerned of another allergy that may pertain to the blood pressure cuff  I encouraged patient to try to come her appointment so we could further help her  Recommended: Patient to come in covered up, patient to wear glasses to protect her eyes, informed her she may refuse blood pressure cuff or any medical device that may cause an allergic reaction, and to dim lights in patient room if possible  Informed patient that I would send this to all staff at [facility] office to make them aware

## 2022-11-15 NOTE — TELEPHONE ENCOUNTER
Patient would just like to speak with a nurse prior to making any changes to her appointment  She would like to make sure, since she is currently having some swallowing issues  Should she keep the in office visit?

## 2022-11-16 ENCOUNTER — TELEMEDICINE (OUTPATIENT)
Dept: GASTROENTEROLOGY | Facility: CLINIC | Age: 47
End: 2022-11-16

## 2022-11-16 ENCOUNTER — TELEPHONE (OUTPATIENT)
Dept: ENDOCRINOLOGY | Facility: HOSPITAL | Age: 47
End: 2022-11-16

## 2022-11-16 VITALS — BODY MASS INDEX: 16.47 KG/M2 | WEIGHT: 99 LBS

## 2022-11-16 DIAGNOSIS — R13.19 OTHER DYSPHAGIA: Primary | ICD-10-CM

## 2022-11-16 NOTE — PROGRESS NOTES
Virtual Regular Visit    Verification of patient location:    Patient is located in the following state in which I hold an active license [place]      Assessment/Plan:    1  GERD  2  Dysphagia   This has been chronic for the patient but recently worsened  She is having trouble swallowing pills, liquid, and foods but this has been intermittent  She saw her PCP yesterday who per the patient looked in her throat and saw swelling  She also talked to her endocrinologist who ordered an U/S of her thyroid to ensure this is not the cause  The patient does have underlying GERD and was recommended to take a PPI and undergo EGD in the past but she has not pursued this due to significant allergies/solar uticaris  - discussed in detail dysphagia and the different types as well as the various stages of swallowing, the patient points to her supraclavicular notch/arytnoid cartilage as the area where she is having trouble making it difficult to know if her dysphagia is esophogeal or pharyngeal especially in the setting of allergies and throat swelling, we discussed from an esophogeal standpoint we can obtain barium swallow and or EGD which the patient is interested in pursuing however she has a lot to arrange given her solar urticaria so in the meantime she is working on getting her synthroid and allegra in liquid form  - discussed PPI however the patient does not want to start this as she is worried about side affects/allergic reactions    3  Weight loss  4  BENITEZ  The patient has a history of weight loss and iron def anemia with her most recent HGB being normal at 13 7 and weight being 99 which is stable from 1 year ago when she was 89  Previously she was recommended to undergo EGD, colonoscopy and or CT and FIT but no testing had been pursued     - given her stable weight, normal HGB, and concerns with her solar urticaria we will not pursue further testing at this time and will focus on her dysphagia     Follow up after EGD Problem List Items Addressed This Visit    None           Reason for visit is   Chief Complaint   Patient presents with   • Follow-up   • Dysphagia     Pt states she is having difficulty swallowing food and pills and liquids  Pt states she is having tenderness on her throat  Pt states her pcp looked at her throat and said she is having swelling    • GERD     Pt states she is burping a lot  Encounter provider Rosalba Rasheed PA-C    Provider located at [address] [place] [address] [address]  MARIA EUGENIA [address] [place] [address]  [phone]      Recent Visits  Date Type Provider Dept   11/14/22 Telephone Kamila Mini Harleen [facility]   Showing recent visits within past 7 days and meeting all other requirements  Today's Visits  Date Type Provider Dept   11/16/22 Telemedicine Rosalba Rasheed PA-C [facility]   Showing today's visits and meeting all other requirements  Future Appointments  No visits were found meeting these conditions  Showing future appointments within next 150 days and meeting all other requirements       The patient was identified by name and date of birth  Marko Ip was informed that this is a telemedicine visit and that the visit is being conducted through the [employer]  She agrees to proceed     My office door was closed  No one else was in the room  She acknowledged consent and understanding of privacy and security of the video platform  The patient has agreed to participate and understands they can discontinue the visit at any time  Patient is aware this is a billable service  Chantel Caceres is a 56 y o  female here for follow up evaluation of dysphagia  This has been chronic for the patient but recently worsened  She is having trouble swallowing pills, liquid, and foods but this has been intermittent   She saw her PCP yesterday who per the patient looked in her throat and saw swelling  The patient does have underlying GERD and was recommended to take a PPI and undergo EGD in the past but she has not pursued this  She also has a history of unintentional weight loss and iron def anemia so colonoscopy was recommended but also not pursued  Because of this CT and FIT testing was ordered but never obtained by the patient  She states she has solar urticaria and being in the sun and fluorescent light causes anaphylaxis  She takes allegra which helps but at this time she has been having trouble swallowing this  Past Medical History:   Diagnosis Date   • Costochondritis    • Fibroid, uterine    • Ovarian cyst    • Seasonal allergies    • Vitamin D deficiency        No past surgical history on file  Current Outpatient Medications   Medication Sig Dispense Refill   • ergocalciferol (VITAMIN D2) 50,000 units  (Patient not taking: Reported on 12/10/2021)     • Fexofenadine HCl (ALLEGRA ALLERGY PO) Take by mouth     • pantoprazole (PROTONIX) 40 mg tablet Take 1 tablet (40 mg total) by mouth daily 30 tablet 0   • Synthroid 75 MCG tablet TAKE 1 TABLET BY MOUTH EVERY DAY 90 tablet 4     No current facility-administered medications for this visit  Allergies   Allergen Reactions   • Metronidazole Anxiety, Dizziness, Palpitations and Shortness Of Breath   • Strawberry (Diagnostic) - Food Allergy Dermatitis, Hives, Itching, Rash and Shortness Of Breath   • Sulfites - Food Allergy Anaphylaxis, Hives and Shortness Of Breath   • Adhesive  [Medical Tape] Hives   • Other      Fluorescent and halogen lights   • Penicillin G        Review of Systems   Constitutional: Negative for activity change, appetite change, chills, fatigue, fever and unexpected weight change  HENT: Positive for sore throat and trouble swallowing  Gastrointestinal: Negative for nausea and vomiting  Video Exam    There were no vitals filed for this visit      Physical Exam  Constitutional: General: She is not in acute distress  Appearance: Normal appearance  She is normal weight  She is not ill-appearing, toxic-appearing or diaphoretic  HENT:      Head: Normocephalic and atraumatic  Nose: Nose normal  No congestion or rhinorrhea  Eyes:      General:         Right eye: No discharge  Left eye: No discharge  Pulmonary:      Effort: Pulmonary effort is normal    Musculoskeletal:      Cervical back: Normal range of motion  Neurological:      Mental Status: She is alert            I spent over 60 minutes with patient today in which greater than 50% of the time was spent in counseling/coordination of care regarding dysphagia

## 2022-11-16 NOTE — TELEPHONE ENCOUNTER
The patient called in this afternoon to inform you that she was seen by her PCP yesterday and she stated that she evaluated her neck and it did seem swollen and it can either be from her thyroid or GERD  The patient stated that she did have blood work drawn yesterday as well and the partial results are in her chart under her Care Everywhere tab and she is still awaiting a Thyroid panel and Vitamin D to come back  She would like you to review then and advise  She also stated that she is still in the process of getting the Thyroid ultrasound done as well

## 2022-11-17 ENCOUNTER — TELEPHONE (OUTPATIENT)
Dept: OTHER | Facility: OTHER | Age: 47
End: 2022-11-17

## 2022-11-17 NOTE — TELEPHONE ENCOUNTER
Call from patient stating she has some additional questions from her visit yesterday  She called an allergist regarding getting her artifical light allergy under control prior to her having any procedures done  She called Dr Manny Devries and their office advised that a referral would need to be put in and if it could be marked as a stat or urgent referral they could see her prior to next year  Please fax referral to: [phone]  She also is asking what would be the preferred hospital would be for her to have the procedures done with her allergies? She was recommend to have an anesthesia consult done prior to any procedure as well and depending on the hospital location she would then be able to have that consult  She is asking if that consult could be done virtually  She also has questions about proton pump therapy or the possibility for alternate medications  She would like to report that she is on one 24 hour Claritin that she started today  She also called about having the Allegra compounded but the pharmacist had never done that before so she reached out to her PCP regarding dosage  She also scheduled a thyroid ultrasound to check to see if that could be causing the symptoms  She also has an abdominal ultrasound scheduled on 12/9/2022  Please call to answer her questions

## 2022-11-18 ENCOUNTER — TELEPHONE (OUTPATIENT)
Dept: OTHER | Facility: OTHER | Age: 47
End: 2022-11-18

## 2022-11-18 DIAGNOSIS — R13.10 DYSPHAGIA, UNSPECIFIED TYPE: Primary | ICD-10-CM

## 2022-11-18 DIAGNOSIS — T78.40XD ALLERGY, SUBSEQUENT ENCOUNTER: ICD-10-CM

## 2022-11-18 DIAGNOSIS — E03.8 HYPOTHYROIDISM DUE TO HASHIMOTO'S THYROIDITIS: Primary | ICD-10-CM

## 2022-11-18 DIAGNOSIS — E06.3 HYPOTHYROIDISM DUE TO HASHIMOTO'S THYROIDITIS: Primary | ICD-10-CM

## 2022-11-18 RX ORDER — LEVOTHYROXINE SODIUM 75 UG/ML
SOLUTION ORAL
Qty: 30 ML | Refills: 6 | Status: SHIPPED | OUTPATIENT
Start: 2022-11-18

## 2022-11-18 NOTE — TELEPHONE ENCOUNTER
I did send it over the tirosint-Sol prescription to the pharmacy  It is 1 mL of liquid to be adjusted orally daily  She should take it like Synthroid on an empty stomach and wait at least half an hour to an hour to eat    There may be a co-pay card that she can apply for or download from the company's website or we have some here she can sign up for which may help with the cost

## 2022-11-22 ENCOUNTER — HOSPITAL ENCOUNTER (OUTPATIENT)
Dept: ULTRASOUND IMAGING | Facility: MEDICAL CENTER | Age: 47
Discharge: HOME | End: 2022-11-22

## 2022-11-22 DIAGNOSIS — E04.9 GOITER: ICD-10-CM

## 2022-11-22 DIAGNOSIS — E04.1 THYROID NODULE: ICD-10-CM

## 2022-11-22 PROBLEM — L56.3 SOLAR URTICARIA: Status: ACTIVE | Noted: 2022-11-22

## 2022-11-23 ENCOUNTER — DOCUMENTATION (OUTPATIENT)
Dept: ENDOCRINOLOGY | Facility: HOSPITAL | Age: 47
End: 2022-11-23

## 2022-11-23 ENCOUNTER — TELEPHONE (OUTPATIENT)
Dept: ANESTHESIOLOGY | Facility: CLINIC | Age: 47
End: 2022-11-23

## 2022-11-23 ENCOUNTER — TELEPHONE (OUTPATIENT)
Dept: ENDOCRINOLOGY | Facility: HOSPITAL | Age: 47
End: 2022-11-23

## 2022-11-23 NOTE — TELEPHONE ENCOUNTER
Because she is being prescribed a medication she needs to be seen at least once a year  Our office policy is that need visual needs to be seen in the office at least once a year  Her last office visit was a virtual visit, so she needs to be seen in the office  I am also confused at which she means about the current Tirosint script being on hold

## 2022-11-23 NOTE — TELEPHONE ENCOUNTER
The patient called in this morning questioning about her upcoming appointment on Monday  She stated that she just had her ultrasound done on Tuesday and she was wondering if her appointment is needed or if she could change it to a virtual     Also she is having issues with swallowing her medication so a new prescription for liquid Tirosint was ordered and is currently on hold  So she was wondering if another prescription for her original Synthroid could be sent to the pharmacy as well  She currently has only ten pills left of the one she has at home

## 2022-11-25 ENCOUNTER — TELEPHONE (OUTPATIENT)
Dept: ANESTHESIOLOGY | Facility: CLINIC | Age: 47
End: 2022-11-25

## 2022-11-25 DIAGNOSIS — E03.8 HYPOTHYROIDISM DUE TO HASHIMOTO'S THYROIDITIS: Primary | ICD-10-CM

## 2022-11-25 DIAGNOSIS — E06.3 HYPOTHYROIDISM DUE TO HASHIMOTO'S THYROIDITIS: Primary | ICD-10-CM

## 2022-11-25 RX ORDER — LEVOTHYROXINE SODIUM 75 MCG
TABLET ORAL
Qty: 26 TABLET | Refills: 0 | Status: SHIPPED | OUTPATIENT
Start: 2022-11-25 | End: 2022-12-26 | Stop reason: SDUPTHER

## 2022-11-25 NOTE — TELEPHONE ENCOUNTER
Synthroid was sent to the pharmacy  She does need to be seen in the office as her thyroid does need to be examined and the office policy is that patient need to been seen in the office once a year

## 2022-11-25 NOTE — TELEPHONE ENCOUNTER
Spoke to patient  She notes that the Tirosint-SOL requires a prior auth so in the meantime she would like a 30 day supply of Synthroid sent in to her pharmacy  Prior auth for Tirosint was completed 11/25/2022    She notes that she has been feeling faint when driving and is trying to limit her artificial light and sun exposure due to switching allergy medications  She said that she was just examined by her pcp  She would still like to switch to a virtual unless you would need to examine her neck based on the us results  She did push her appointment out a few weeks since we do not have the results of her thyroid us back yet

## 2022-12-26 DIAGNOSIS — E06.3 HYPOTHYROIDISM DUE TO HASHIMOTO'S THYROIDITIS: ICD-10-CM

## 2022-12-26 DIAGNOSIS — E03.8 HYPOTHYROIDISM DUE TO HASHIMOTO'S THYROIDITIS: ICD-10-CM

## 2022-12-27 RX ORDER — LEVOTHYROXINE SODIUM 75 MCG
TABLET ORAL
Qty: 26 TABLET | Refills: 0 | Status: SHIPPED | OUTPATIENT
Start: 2022-12-27

## 2023-01-24 ENCOUNTER — TELEPHONE (OUTPATIENT)
Dept: ENDOCRINOLOGY | Facility: CLINIC | Age: 48
End: 2023-01-24

## 2023-01-24 NOTE — TELEPHONE ENCOUNTER
Received notification from front staff that patient canceled her appt for Jan 26th  Patient was sent a final warning letter back in December indicating that if the next appt results in a no show /cancellation pt will be formally discharged  Left detailed message on cell phone that since patient has canceled again no more refills will be authorized  Patient can reach out to PCP to see if they will refill medication

## 2023-10-10 ENCOUNTER — NURSE TRIAGE (OUTPATIENT)
Age: 48
End: 2023-10-10

## 2023-10-10 NOTE — TELEPHONE ENCOUNTER
Patient states that she has Dysphagia  And usually once a month or so it gets worse. Im going through a lot of stress and I think it's making it worse.   " Sometimes I will swallow before I intend to swallow"    Yesterday I was eating chips and they started to go down before I wanted them to, I felt like something was stuck but I was able to eat ice cream,cookie and drink water without a problem but I still feel like there is something maybe stuck" " I took my Claritin this morning and felt like that got but with drink a full glass of water and then ate a muffin which all went down"   Patient states that she is also having nausea  " one bout of severe nausea a month"  " I am now eating a pint of cayla and baljit's ice cream a day and still hanging out at 98 lbs"    Patient states that she has an artifical light allergy. Another new symptoms when I was eating my ice cream it felt like it numbed my throat and was having tongue muscle spasms, is there something process wise that going wrong with my swallowing"     Patient only requesting virtual apt, scheduled for the earliest virtual visit in Dec.  Patient declined an urgent apt at this time. Will call back if symptoms get worse.

## 2023-10-12 NOTE — TELEPHONE ENCOUNTER
Pt called back about her dysphagia and having trouble swallowing pills. However she was able to swallow muffin and liquids. Pt has concern that pill may be stuck and she is unsure if she should take liquid Claritin instead. Pt requested OV tomorrow     I advised liquid diet, I recommended ED if pt truly feels pill is stuck.  I scheduled pt for OV per her request

## 2023-10-13 ENCOUNTER — TELEPHONE (OUTPATIENT)
Dept: GASTROENTEROLOGY | Facility: CLINIC | Age: 48
End: 2023-10-13

## 2023-10-13 ENCOUNTER — TELEMEDICINE (OUTPATIENT)
Dept: GASTROENTEROLOGY | Facility: CLINIC | Age: 48
End: 2023-10-13
Payer: COMMERCIAL

## 2023-10-13 VITALS — BODY MASS INDEX: 15.99 KG/M2 | WEIGHT: 96 LBS | HEIGHT: 65 IN

## 2023-10-13 DIAGNOSIS — R13.19 ESOPHAGEAL DYSPHAGIA: Primary | ICD-10-CM

## 2023-10-13 DIAGNOSIS — R63.4 UNINTENTIONAL WEIGHT LOSS OF MORE THAN 10 POUNDS: ICD-10-CM

## 2023-10-13 DIAGNOSIS — K21.9 GASTROESOPHAGEAL REFLUX DISEASE WITHOUT ESOPHAGITIS: ICD-10-CM

## 2023-10-13 PROCEDURE — 99214 OFFICE O/P EST MOD 30 MIN: CPT | Performed by: PHYSICIAN ASSISTANT

## 2023-10-13 RX ORDER — EPINEPHRINE 0.3 MG/.3ML
INJECTION SUBCUTANEOUS
COMMUNITY
Start: 2023-10-09

## 2023-10-13 RX ORDER — ALBUTEROL SULFATE 90 UG/1
2 AEROSOL, METERED RESPIRATORY (INHALATION) EVERY 6 HOURS PRN
COMMUNITY
Start: 2023-10-09

## 2023-10-13 NOTE — PROGRESS NOTES
Virtual Regular Visit  Verification of patient location:Patient is located at Home in the following state in which I hold an active license [place]    Assessment/Plan:  1. Esophageal dysphagia  2. Gastroesophageal reflux disease without esophagitis  3. Unintentional weight loss of more than 10 pounds  I had a long conversation with the patient today regarding symptoms. I explained to the patient that her symptoms certainly could be related to GERD and acid reflux. I also explained to the patient that it is difficult to say with certainty her symptoms are related to GERD without further testing. We discussed that dysphagia could be due to motility disorders of the esophagus, GERD, allergic conditions like eosinophilic esophagitis, worst-case esophageal cancer. She expressed understanding to this. At this time I recommended that patient take small bites of food, carefully chew her food with sips of water. I instructed patient to eat and drink sitting upright. I instructed the patient to avoid laying down after eating. I offered prescription for Pepcid/antacid medication for patient to try, patient declines this. Ultimately I recommended patient undergo barium swallow study, CT of the abdomen and pelvis, and EGD for further evaluation. Patient requests that CT scan be done without oral or IV contrast, I did explain that this does limit what we can see/the quality of the test.  I provided reassurance. Explained that with her symptoms these tests will help us understand how we can best help her, patient expressed understanding to this  I also explained that we cannot rule out cancer as a cause of her symptoms without further testing, patient expressed understanding to this. Patient did state that she would like Dr. Omayra Nicholas to perform her EGD if possible. Patient was instructed to call the office with any questions, concerns, new/ worsening/ persisting GI symptoms.  Advised patient go to the ER with any severe or worsening abdominal pain, fevers/ chills, intractable N/V, chest pain, SOB, dizziness, lightheadedness, feeling something stuck in esophagus that will not go down. Patient expressed understanding and is in agreement with treatment plan. Will plan to follow up after diagnostic tests with Dr. Ambika Isaacs in a few months       - FL barium swallow; Future  - CT abdomen pelvis wo contrast; Future  - EGD; Future       Encounter provider Steff Soliman PA-C    Provider located at [address] [place] [address]  [phone]    Recent Visits  No visits were found meeting these conditions. Showing recent visits within past 7 days and meeting all other requirements  Today's Visits  Date Type Provider Dept   10/13/23 [address] [place] [address] [facility] [address]   Showing today's visits and meeting all other requirements  Future Appointments  No visits were found meeting these conditions. Showing future appointments within next 150 days and meeting all other requirements     The patient was identified by name and date of birth. Loan Gomez was informed that this is a telemedicine visit and that the visit is being conducted through the [employer]. She agrees to proceed. .  My office door was closed. No one else was in the room. She acknowledged consent and understanding of privacy and security of the video platform. The patient has agreed to participate and understands they can discontinue the visit at any time. Patient is aware this is a billable service.      Reason for visit is   Chief Complaint   Patient presents with    Dysphagia     Pt states she is having trouble swallowing    GERD     Pt states once a month she gets bad reflux        Subjective    Loan Gomez is a 53 y.o. female with PMH of hypothyroidism due to Hashimoto's's, [address] [place] [address] who presents via video televisit today to discuss GERD and dysphagia. Patient was last seen via telemedicine by Kaci Huntley PA-C in 11/2022, previous telemedicine note reviewed. At that visit she complained of chronic but worsening GERD and dysphagia at which time an EGD was recommended. Appears the EGD order was discontinued/not completed. Also appears a CT abdomen pelvis, ultrasound abdomen, and routine barium swallow have all been ordered in the past but not completed/ scheduled. Patient complains of ongoing intermittent swallowing issues. She tells me that once/ month she has episodes of dysphagia that can last for several days. During these episodes food goes down very slowly, she has globus sensation, feels food get caught in upper esophagus after a swallow. Food does eventually go down. She does have rare issues swallowing liquids, can have issues swallowing pills as well. She has associated heartburn, reflux, belching during these episodes   Symptoms overall come and go. She notes that her symptoms are much worse with stress. She has been under a lot of stress with house and job. Her weight has been overall stable recently but she does note significant unintentional weight loss weight loss over the last few years  Patient denies any fevers/ chills, abdominal pain, black or bloody stools,  odynophagia. She notes that she has several allergies. She tells me she is allergic to artifical light which makes going to appointments difficult. She has been fearful to undergo EGD.  She does not want to swallow oral contrast / have any IV contrast       No prior colonoscopy or EGD     HPI     Past Medical History:   Diagnosis Date    Costochondritis     Fibroid, uterine     Ovarian cyst     Seasonal allergies     Thyroid disease     Vitamin D deficiency        Past Surgical History:   Procedure Laterality Date    WISDOM TOOTH EXTRACTION         Current Outpatient Medications   Medication Sig Dispense Refill    cetirizine (ZyrTEC) 10 mg tablet Take 1 tablet (10 mg total) by mouth daily for 365 doses 30 tablet 11    ergocalciferol (VITAMIN D2) 50,000 units  (Patient not taking: Reported on 12/10/2021)      loratadine (CLARITIN) 10 mg tablet Take 10 mg by mouth daily      pantoprazole (PROTONIX) 40 mg tablet Take 1 tablet (40 mg total) by mouth daily (Patient not taking: Reported on 11/16/2022) 30 tablet 0    Synthroid 75 MCG tablet Take 1 tablet 6 days a week and no tab on Sunday. 26 tablet 0    Tirosint-SOL 75 MCG/ML SOLN 75 mcg liquid orally daily 30 mL 6     No current facility-administered medications for this visit. Allergies   Allergen Reactions    Metronidazole Anxiety, Dizziness, Palpitations and Shortness Of Breath    Strawberry (Diagnostic) - Food Allergy Dermatitis, Hives, Itching, Rash and Shortness Of Breath    Sulfites - Food Allergy Anaphylaxis, Hives and Shortness Of Breath    Adhesive  [Medical Tape] Hives    Penicillin G     Other Hives, Nausea Only, Rash, Other (See Comments) and Dizziness     Fluorescent and halogen lights   *Solar Urticaria- Sun and  *Pressure Urticaria       Review of Systems   Constitutional:  Negative for chills and fever. HENT:  Positive for trouble swallowing. Negative for ear pain and sore throat. Eyes:  Negative for pain and visual disturbance. Respiratory:  Negative for cough and shortness of breath. Cardiovascular:  Negative for chest pain and palpitations. Gastrointestinal:  Negative for abdominal pain and vomiting. Genitourinary:  Negative for dysuria and hematuria. Musculoskeletal:  Negative for arthralgias and back pain. Skin:  Negative for color change and rash. Neurological:  Negative for seizures and syncope. All other systems reviewed and are negative. Video Exam    There were no vitals filed for this visit. Physical Exam  Constitutional:       General: She is not in acute distress. Appearance: Normal appearance.  She is not ill-appearing, toxic-appearing or diaphoretic. HENT:      Head: Normocephalic and atraumatic. Nose: Nose normal.   Eyes:      General: No scleral icterus. Conjunctiva/sclera: Conjunctivae normal.   Pulmonary:      Effort: Pulmonary effort is normal. No respiratory distress. Skin:     Coloration: Skin is not jaundiced. Findings: No erythema. Neurological:      General: No focal deficit present. Mental Status: She is alert and oriented to person, place, and time.    Psychiatric:         Mood and Affect: Mood normal.         Behavior: Behavior normal.        Visit Time  Total Visit Duration: 35 minutes

## 2023-10-13 NOTE — PATIENT INSTRUCTIONS
GERD (Gastroesophageal Reflux Disease)   AMBULATORY CARE:   Gastroesophageal reflux disease (GERD)  is reflux that happens more than 2 times a week for a few weeks. Reflux means acid and food in your stomach back up into your esophagus. GERD can cause other health problems over time if it is not treated. Common causes of GERD:  GERD often happens because the lower muscle (sphincter) of the esophagus does not close properly. The sphincter normally opens to let food into the stomach. It then closes to keep food and stomach acid in the stomach. If the sphincter does not close properly, stomach acid and food back up (reflux) into the esophagus. The following may increase your risk for GERD:  Certain foods such as spicy foods, chocolate, foods that contain caffeine, peppermint, and fried foods    Hiatal hernia    Certain medicines such as calcium channel blockers (used to treat high blood pressure), allergy medicines, sedatives, or antidepressants    Pregnancy, obesity, or scleroderma    Lying down after a meal    Drinking alcohol or smoking cigarettes    Signs and symptoms:   Heartburn (burning pain in your chest)    Pain after meals that spreads to your neck, jaw, or shoulder    Pain that gets better when you change positions    Bitter or acid taste in your mouth    A dry cough    Trouble swallowing or pain with swallowing    Hoarseness or a sore throat    Burping or hiccups    Feeling full soon after you start eating    Call your local emergency number (911 in the [address]) if:   You have severe chest pain and sudden trouble breathing. Seek care immediately if:   You have trouble breathing after you vomit. You have trouble swallowing, or pain with swallowing. Your bowel movements are black, bloody, or tarry-looking. Your vomit looks like coffee grounds or has blood in it. Call your doctor or gastroenterologist if:   You feel full and cannot burp or vomit.     You vomit large amounts, or you vomit often.    You are losing weight without trying. Your symptoms get worse or do not improve with treatment. You have questions or concerns about your condition or care. Treatment for GERD:   Medicines  are used to decrease stomach acid. Medicine may also be used to help your lower esophageal sphincter and stomach contract (tighten) more. Surgery  is done to wrap the upper part of the stomach around the esophageal sphincter. This will strengthen the sphincter and prevent reflux. Manage GERD:       Do not have foods or drinks that may increase heartburn. These include chocolate, peppermint, fried or fatty foods, drinks that contain caffeine, or carbonated drinks (soda). Other foods include spicy foods, onions, tomatoes, and tomato-based foods. Do not have foods or drinks that can irritate your esophagus, such as citrus fruits, juices, and alcohol. Do not eat large meals. When you eat a lot of food at one time, your stomach needs more acid to digest it. Eat 6 small meals each day instead of 3 large meals, and eat slowly. Do not eat meals 2 to 3 hours before bedtime. Elevate the head of your bed. Place 6-inch blocks under the head of your bed frame. You may also use more than one pillow under your head and shoulders while you sleep. Maintain a healthy weight. If you are overweight, weight loss may help relieve symptoms of GERD. Do not smoke. Smoking weakens the lower esophageal sphincter and increases the risk of GERD. Ask your healthcare provider for information if you currently smoke and need help to quit. E-cigarettes or smokeless tobacco still contain nicotine. Talk to your healthcare provider before you use these products. Do not put pressure on your abdomen. Pressure pushes acid up into your esophagus. Do not wear clothing that is tight around your waist. Do not bend over. Bend at the knees if you need to pick something up.   Follow up with your doctor or gastroenterologist as directed:  Write down your questions so you remember to ask them during your visits. © Copyright [facility] 2023 Information is for End User's use only and may not be sold, redistributed or otherwise used for commercial purposes. The above information is an  only. It is not intended as medical advice for individual conditions or treatments. Talk to your doctor, nurse or pharmacist before following any medical regimen to see if it is safe and effective for you.

## 2023-12-01 ENCOUNTER — NURSE TRIAGE (OUTPATIENT)
Age: 48
End: 2023-12-01

## 2023-12-01 NOTE — TELEPHONE ENCOUNTER
Regarding: symptom call  ----- Message from Mario Campos sent at 12/1/2023  8:51 AM EST -----  Patient called and requested a call back to go over her symptoms, she stated she thinks she has food postioning and already lost 5 pounds please review and reach out thank you

## 2023-12-01 NOTE — TELEPHONE ENCOUNTER
Patient calling in, spoke with her at length. Reports she thinks she may of had food poisoning vs a glycol poisoning. She explains she has been struggling with losing more weight and severe nausea with abdominal pains from last Thursday. Was 95lbs now down to 92lbs. Pt eats a pint of cayla and baljit's ice cream every night to maintain weight at 95 lbs but last week noticed the ice cream had a weird foam at the top but ate it and then realized the other pints she had looked like they had melted and refreeze. On Thursday ate a michelle chocolate chip muffin which smelled odd but still ate it and immediately started with moderate- severe abdominal pains and nausea. She tried to eat kettle chips recently and after 3-4 chips had burning on the eyes and looked at [employer] and called poison control. They explained not likely with the little amounts of chips she ate but she does have a low weight. She was told there is not antidote for this but wants this ruled out since it can cause dealth. Pt wants to avoid to ED if possible due to artificial light allergy. I advised pt she should discuss her questions and concerns with Dr. Capo Cho on 12/7 during virtual visit but patient does not think she can go a week without speaking with a provider. She reports she is concerned with losing more weight, severe nausea and the if she would have glycol poisoning. Other symptoms she would like mentioned is Tingling in hands and face, recently diagnosed by ENT with  deviated nasal septum and Turbinate hypertrophy     Denies vomiting or diarrhea.

## 2023-12-07 ENCOUNTER — TELEMEDICINE (OUTPATIENT)
Dept: GASTROENTEROLOGY | Facility: CLINIC | Age: 48
End: 2023-12-07
Payer: COMMERCIAL

## 2023-12-07 ENCOUNTER — TELEPHONE (OUTPATIENT)
Dept: GASTROENTEROLOGY | Facility: CLINIC | Age: 48
End: 2023-12-07

## 2023-12-07 VITALS — WEIGHT: 92 LBS | BODY MASS INDEX: 14.79 KG/M2 | HEIGHT: 66 IN

## 2023-12-07 DIAGNOSIS — D50.9 IRON DEFICIENCY ANEMIA, UNSPECIFIED IRON DEFICIENCY ANEMIA TYPE: ICD-10-CM

## 2023-12-07 DIAGNOSIS — R13.19 ESOPHAGEAL DYSPHAGIA: ICD-10-CM

## 2023-12-07 DIAGNOSIS — R63.4 WEIGHT LOSS, UNINTENTIONAL: ICD-10-CM

## 2023-12-07 DIAGNOSIS — K21.9 GASTROESOPHAGEAL REFLUX DISEASE WITHOUT ESOPHAGITIS: Primary | ICD-10-CM

## 2023-12-07 PROCEDURE — 99214 OFFICE O/P EST MOD 30 MIN: CPT | Performed by: INTERNAL MEDICINE

## 2023-12-07 NOTE — TELEPHONE ENCOUNTER
Pt returning a call to [facility]. Pt states she will be in meetings all day tomorrow, [employer] is a good way to reach her. She received message regarding date of apt but cannot recall the time. Pt confirmed this date does work for her.

## 2023-12-07 NOTE — PROGRESS NOTES
Virtual Regular Visit    Verification of patient location:    Patient is located at Home in the following state in which I hold an active license [place]      Assessment/Plan:    Problem List Items Addressed This Visit          Digestive    Gastroesophageal reflux disease without esophagitis - Primary    Esophageal dysphagia       Other    Weight loss, unintentional    Iron deficiency anemia     1. Gastroesophageal reflux disease without esophagitis  2. Esophageal dysphagia  She has reflux and dysphagia symptoms and I had recommended an upper endoscopy but she does not feel comfortable doing this because of her solar urticaria and light sensitivity. I encouraged her to schedule the upper endoscopy when she feels comfortable. Her recent symptoms may have been due to food poisoning or a stomach virus or the tear was sent. I asked her to keep a food diary and monitor and correlate her food intake with any recurrent symptoms. Also I want her to see if her symptoms correlate with restarting the Tirosint. I will schedule her for a follow-up visit in about 3 to 4 months. 3. Iron deficiency anemia, unspecified iron deficiency anemia type  4. Weight loss, unintentional  I encouraged her to follow-up for upper endoscopy and colonoscopy when she feels comfortable. Again she is not open to scheduling these procedures at this time due to her solar urticaria and light sensitivity. Reason for visit is   Chief Complaint   Patient presents with    Dysphagia     Pt having trouble swallowing, getting tighter, ongoing 2 yrs. GERD     Some burning in chest and throat, once a month. Virtual Regular Visit          Encounter provider Yvonne Wilson MD    Provider located at [address] [place] [address] [address]  [place] [address] [place]  [phone]      Recent Visits  No visits were found meeting these conditions.   Showing recent visits within past 7 days and meeting all other requirements  Today's Visits  Date Type Provider Dept   12/07/23 Telemedicine Shahid Duque, [address] [facility]   Showing today's visits and meeting all other requirements  Future Appointments  No visits were found meeting these conditions. Showing future appointments within next 150 days and meeting all other requirements       The patient was identified by name and date of birth. Torres Samano was informed that this is a telemedicine visit and that the visit is being conducted through the [email or web address]. She agrees to proceed. .  My office door was closed. No one else was in the room. She acknowledged consent and understanding of privacy and security of the video platform. The patient has agreed to participate and understands they can discontinue the visit at any time. Patient is aware this is a billable service. tOis Parekh is a 53 y.o. female with reflux, dysphagia, iron deficiency anemia, and weight loss. She has noticed a number of food triggers and recently started taking Tirosint. She is not sure if it was the Tirosint or the food triggers. She feels her symptoms were better after restricting her diet and stopping the Tirosint and is now restarted the Tirosint and open to trying some foods again. She had lost some weight but now her weight is stabilized around 92 pounds. She has not had any diarrhea, constipation, bleeding, or vomiting.       Past Medical History:   Diagnosis Date    Costochondritis     Deviated septum 2023    left-with spur    Fibroid, uterine     Food poisoning 2023    Ovarian cyst     Seasonal allergies     Thyroid disease     Vitamin D deficiency        Past Surgical History:   Procedure Laterality Date    WISDOM TOOTH EXTRACTION         Current Outpatient Medications   Medication Sig Dispense Refill    albuterol (PROVENTIL HFA,VENTOLIN HFA) 90 mcg/act inhaler Inhale 2 puffs every 6 (six) hours as needed EPINEPHrine (EPIPEN) 0.3 mg/0.3 mL SOAJ       ergocalciferol (VITAMIN D2) 50,000 units  (Patient not taking: Reported on 12/10/2021)      loratadine (CLARITIN) 10 mg tablet Take 10 mg by mouth daily      Synthroid 75 MCG tablet Take 1 tablet 6 days a week and no tab on Sunday. 26 tablet 0    Tirosint-SOL 75 MCG/ML SOLN 75 mcg liquid orally daily 30 mL 6     No current facility-administered medications for this visit. Allergies   Allergen Reactions    Metronidazole Anxiety, Dizziness, Palpitations and Shortness Of Breath    Other Anaphylaxis, Hives, Shortness Of Breath, Itching, Photosensitivity, Nausea Only, Swelling, Anxiety, Palpitations, Rash, Other (See Comments), Tinnitus, GI Intolerance, Dizziness, Headache, Tachycardia, Confusion, Chest Pain, Fatigue, Irritability, Syncope, Drowsiness and Nasal Congestion     Fluorescent and halogen lights     *Solar Urticaria- Sun and    *Pressure Urticaria    *Pesticides    Strawberry (Diagnostic) - Food Allergy Dermatitis, Hives, Itching, Rash and Shortness Of Breath    Sulfites - Food Allergy Anaphylaxis, Hives and Shortness Of Breath    Adhesive  [Medical Tape] Hives    American Cockroach Other (See Comments)    Cat Hair Extract Other (See Comments)    Dog Epithelium Allergy Skin Test Other (See Comments)    Peanut Oil - Food Allergy Other (See Comments)     "Borderline allergy" per pt    Penicillin G     Pollen Extract Other (See Comments)    Shellfish-Derived Products - Food Allergy Other (See Comments)    Shrimp Extract Allergy Skin Test - Food Allergy Other (See Comments)       REVIEW OF SYSTEMS:    CONSTITUTIONAL: Denies any fever, chills, rigors, and weight loss. HEENT: No earache or tinnitus. Denies hearing loss or visual disturbances. CARDIOVASCULAR: No chest pain or palpitations. RESPIRATORY: Denies any cough, hemoptysis, shortness of breath or dyspnea on exertion. GASTROINTESTINAL: As noted in the History of Present Illness.    GENITOURINARY: No problems with urination. Denies any hematuria or dysuria. NEUROLOGIC: No dizziness or vertigo, denies headaches. MUSCULOSKELETAL: Denies any muscle or joint pain. SKIN: Denies skin rashes or itching. ENDOCRINE: Denies excessive thirst. Denies intolerance to heat or cold. PSYCHOSOCIAL: Denies depression or anxiety. Denies any recent memory loss. PHYSICAL EXAMINATION:  Appearance and vitals taken from home devices    Video Exam    Vitals:    12/07/23 1024   Weight: 41.7 kg (92 lb)   Height: 5' 6" (1.676 m)         General Appearance:   Alert, cooperative, no distress, thin   HEENT:  Normocephalic, atraumatic, anicteric. Neck supple, symmetrical, trachea midline. Lungs:   Equal chest rise and unlabored breathing, normal effort, no coughing. Cardiovascular:   No visualized JVD. Abdomen:   No abdominal distension. Skin:   No jaundice, rashes, or lesions. Musculoskeletal:   Normal range of motion visualized. Psych:  Normal affect and normal insight. Neuro:  Alert and appropriate.          Visit Time  Total Visit Duration: 20

## 2023-12-07 NOTE — TELEPHONE ENCOUNTER
I called the patient and lvm in regards to scheduling next follow up appointment with Dr. Mariano Dash (approval to [facility]).

## 2024-01-02 ENCOUNTER — TELEPHONE (OUTPATIENT)
Dept: GASTROENTEROLOGY | Facility: CLINIC | Age: 49
End: 2024-01-02

## 2024-01-02 NOTE — TELEPHONE ENCOUNTER
Pt. Will not iris able to make appointment today due to needing to take disabled daughter for blood work, pt. Is concerned about Vitamin C  abnormal lab test <5 , pt. Admits to not drinking OJ for 2 days because of being on allegra and esophagitis and is on a very restrictive diet and has not had vitamin C, pt. Feels symptoms are similar to Scurvy and would like to reschedule or have a virtual in the next day or so , pt. Did not complete other tests and plan on getting It done in the near future

## 2024-01-02 NOTE — TELEPHONE ENCOUNTER
LM for patient to call and confirm she is coming in for her apt. Today. Al so mentioned unless she is having a change in her symptoms it may be better to get the tests completed and follow up afterward. Asked patient to call and discuss further

## 2024-01-04 ENCOUNTER — TELEMEDICINE (OUTPATIENT)
Dept: GASTROENTEROLOGY | Facility: CLINIC | Age: 49
End: 2024-01-04
Payer: COMMERCIAL

## 2024-01-04 ENCOUNTER — TELEPHONE (OUTPATIENT)
Dept: GASTROENTEROLOGY | Facility: CLINIC | Age: 49
End: 2024-01-04

## 2024-01-04 DIAGNOSIS — K21.9 GASTROESOPHAGEAL REFLUX DISEASE WITHOUT ESOPHAGITIS: ICD-10-CM

## 2024-01-04 DIAGNOSIS — R79.89 LOW SERUM VITAMIN C: Primary | ICD-10-CM

## 2024-01-04 DIAGNOSIS — R63.4 WEIGHT LOSS, UNINTENTIONAL: ICD-10-CM

## 2024-01-04 PROCEDURE — 99213 OFFICE O/P EST LOW 20 MIN: CPT | Performed by: PHYSICIAN ASSISTANT

## 2024-01-04 RX ORDER — MULTIVIT WITH MINERALS/LUTEIN
1000 TABLET ORAL DAILY
COMMUNITY

## 2024-01-04 NOTE — TELEPHONE ENCOUNTER
Called pt with recommendation for a family/PCP from Dr. Owusu with choices of Dr.Dennis Olmos or Dr. Magali Kenyon, pt was glad to hear back so quickly.

## 2024-01-04 NOTE — TELEPHONE ENCOUNTER
----- Message from Maria Esther Clement PA-C sent at 1/4/2024 10:34 AM EST -----  Regarding: FW: Patient question  Please call the patient and inform her of Dr. Mcclain's PCP recommendations per below    Thanks!  ----- Message -----  From: Jer Owusu MD  Sent: 1/4/2024  10:05 AM EST  To: Maria Esther Clement PA-C  Subject: RE: Patient question                             Thanks for seeing her! There are so many great [facility]'s PCP's in the area that she can't go wrong. Two of my favorites are Salvatore Olmos and Magali Kenyon.    ----- Message -----  From: Maria Esther Clement PA-C  Sent: 1/4/2024   8:46 AM EST  To: Jer Owusu MD  Subject: Patient question                                 Hi Dr. Owusu,    I hope you are doing well.  I saw this mutual patient of ours in the office this morning for a televisit.  She is currently in the process of looking for a new family doctor/PCP as her previous one retired.  She asked I reach out to you for any potential recommendations you may have for a new family doctor/PCP for her.    Please let me know your thoughts whenever you can.   Thanks!  Maria Esther Clement PA-C

## 2024-01-04 NOTE — PROGRESS NOTES
Virtual Regular Visit    Verification of patient location:    Patient is located at Home in the following state in which I hold an active license [place]      Assessment/Plan:    1. Low serum vitamin C  I discussed with the patient her low vitamin C level may be due to possible underlying GI diagnosis or restrictive diet for several years.  She has been recommended to undergo barium swallow, CT scan, EGD, colonoscopy in the past for further evaluation of ongoing symptoms.  These tests have never been completed.  At this time I recommended and encouraged ongoing follow-up with family doctor/PCP and ongoing vitamin C supplementation, 1000 mg daily.  Discussed her vitamin C level should be rechecked in the future/monitored.  Discussed with the patient it is not clear how long it will take for her vitamin C level to return to a normal level.   Will refer to dietitian/nutrition for further guidance    All questions were answered.  Patient was appreciative of video visit today.    - Ambulatory Referral to Nutrition Services; Future    2. Weight loss, unintentional  3. Gastroesophageal reflux disease without esophagitis- Well controlled         Patient was instructed to call the office with any questions, concerns, new/ worsening/ persisting GI symptoms. Advised patient go to the ER with any severe or worsening abdominal pain, fevers/ chills, intractable N/V, chest pain, SOB, dizziness, lightheadedness, feeling something stuck in esophagus that will not go down. Patient expressed understanding and is in agreement with treatment plan.     She will keep follow-up visit with Dr. Anthony in April as scheduled       Reason for visit is   Chief Complaint   Patient presents with    Follow-up        Encounter provider Maria Esther Clement PA-C    Provider located at [facility] [place] [address]  [address]  [place] [address] [phone]      [facility]  Visits  Date Type Provider Dept   01/02/24 Telephone Rhonda Elias [facility]   Showing recent visits within past 7 days and meeting all other requirements  Today's Visits  Date Type Provider Dept   01/04/24 Telemedicine Maria Esther Clement PA-C [facility]   Showing today's visits and meeting all other requirements  Future Appointments  No visits were found meeting these conditions.  Showing future appointments within next 150 days and meeting all other requirements       The patient was identified by name and date of birth. Cate Peña was informed that this is a telemedicine visit and that the visit is being conducted through the [employer] Embedded platform. She agrees to proceed..  My office door was closed. No one else was in the room.  She acknowledged consent and understanding of privacy and security of the video platform. The patient has agreed to participate and understands they can discontinue the visit at any time.    Patient is aware this is a billable service.     Subjective    HPI   Cate Peña is a 48 y.o. female with PMH of hypothyroidism due to Hashimoto's's, solar [place] area who presents via video televisit today for follow-up.  She was last seen by me in the office 10/13/2023 for ongoing esophageal dysphagia, GERD, weight loss, previous office note written by me was reviewed.  At that time I ordered an EGD, CT abdomen pelvis, barium swallow.  These tests have not been completed.  Patient was also seen by Dr. Owusu 12/7/2023, that office note was also reviewed.      She presents to the office today with concerns over recent labs, specifically with Vitamin C level.    She tells me that her PCP/ family doctor ordered labs at the end of December. Labs reviewed in care Everywhere. She notes that her previous family doctor has retired.   Labs 12/27/2023 reviewed- vitamin B 12 normal. Very low vitamin D at 14. BMP, magnesium and folate normal. Noted to have elevated  cholesterol and Hgb A1C 5.7. Noted to have elevated TSH as well.   She reports her Vitamin C level came back low <5- she feels as though some of her ongoing symptoms can be consistent with Scurvy.   She states she had a virtual visit with new family doctor who told patient to take 1000 mg of Vitamin C daily x 1 month and recheck the level.   Cate tells me her stress/ anxiety is high since receiving low Vitamin C result. She is also having job and home life issues/ stressors.   She notes her nausea and acid reflux has been overall better recently. Trying to experiment with new foods.   Her weight is overall stable but low, she reports 91 lbs today.  Due to her multiple allergies she follows a generally restrictive diet    Past Medical History:   Diagnosis Date    Costochondritis     Deviated septum 2023    left-with spur    Fibroid, uterine     Food poisoning 2023    Ovarian cyst     Seasonal allergies     Thyroid disease     Vitamin D deficiency        Past Surgical History:   Procedure Laterality Date    WISDOM TOOTH EXTRACTION         Current Outpatient Medications   Medication Sig Dispense Refill    albuterol (PROVENTIL HFA,VENTOLIN HFA) 90 mcg/act inhaler Inhale 2 puffs every 6 (six) hours as needed      ascorbic acid (VITAMIN C) 250 mg tablet Take 1,000 mg by mouth daily      EPINEPHrine (EPIPEN) 0.3 mg/0.3 mL SOAJ       loratadine (CLARITIN) 10 mg tablet Take 10 mg by mouth daily      Synthroid 75 MCG tablet Take 1 tablet 6 days a week and no tab on Sunday. 26 tablet 0    Tirosint-SOL 75 MCG/ML SOLN 75 mcg liquid orally daily 30 mL 6    ergocalciferol (VITAMIN D2) 50,000 units  (Patient not taking: Reported on 12/10/2021)       No current facility-administered medications for this visit.        Allergies   Allergen Reactions    Metronidazole Anxiety, Dizziness, Palpitations and Shortness Of Breath    Other Anaphylaxis, Hives, Shortness Of Breath, Itching, Photosensitivity, Nausea Only, Swelling, Anxiety,  "Palpitations, Rash, Other (See Comments), Tinnitus, GI Intolerance, Dizziness, Headache, Tachycardia, Confusion, Chest Pain, Fatigue, Irritability, Syncope, Drowsiness and Nasal Congestion     Fluorescent and halogen lights     *Solar Urticaria- Sun and    *Pressure Urticaria    *Pesticides    Strawberry (Diagnostic) - Food Allergy Dermatitis, Hives, Itching, Rash and Shortness Of Breath    Sulfites - Food Allergy Anaphylaxis, Hives and Shortness Of Breath    Adhesive  [Medical Tape] Hives    American Cockroach Other (See Comments)    Cat Hair Extract Other (See Comments)    Dog Epithelium Allergy Skin Test Other (See Comments)    Peanut Oil - Food Allergy Other (See Comments)     \"Borderline allergy\" per pt    Penicillin G     Pollen Extract Other (See Comments)    Shellfish-Derived Products - Food Allergy Other (See Comments)    Shrimp Extract Allergy Skin Test - Food Allergy Other (See Comments)       Review of Systems   Constitutional:  Negative for chills and fever.   HENT:  Negative for ear pain and sore throat.    Eyes:  Negative for pain and visual disturbance.   Respiratory:  Negative for cough and shortness of breath.    Cardiovascular:  Negative for chest pain and palpitations.   Gastrointestinal:  Negative for abdominal pain and vomiting.   Genitourinary:  Negative for dysuria and hematuria.   Musculoskeletal:  Negative for arthralgias and back pain.   Skin:  Negative for color change and rash.   Neurological:  Negative for seizures and syncope.   All other systems reviewed and are negative.      Video Exam    Physical Exam  Constitutional:       Appearance: She is not ill-appearing or toxic-appearing.   HENT:      Head: Normocephalic and atraumatic.   Eyes:      Conjunctiva/sclera: Conjunctivae normal.   Pulmonary:      Effort: No respiratory distress.      Breath sounds: No stridor.   Skin:     Coloration: Skin is not jaundiced or pale.   Neurological:      General: No focal deficit present.      " Mental Status: She is alert and oriented to person, place, and time. Mental status is at baseline.   Psychiatric:         Mood and Affect: Mood normal.         Behavior: Behavior normal.        Visit Time  Total Visit Duration: 25 minutes

## 2024-02-29 ENCOUNTER — CLINICAL SUPPORT (OUTPATIENT)
Dept: NUTRITION | Facility: HOSPITAL | Age: 49
End: 2024-02-29
Payer: COMMERCIAL

## 2024-02-29 VITALS — BODY MASS INDEX: 14.94 KG/M2 | HEIGHT: 66 IN | WEIGHT: 93 LBS

## 2024-02-29 DIAGNOSIS — R79.89 LOW SERUM VITAMIN C: ICD-10-CM

## 2024-02-29 PROCEDURE — 97802 MEDICAL NUTRITION INDIV IN: CPT

## 2024-02-29 NOTE — PROGRESS NOTES
" Nutrition Assessment Form    Patient Name: Cate Peña    YOB: 1975    Sex: Female     Assessment Date: 2/29/2024  Start Time:  1:30 pm Stop Time: 2:30 pm Total Minutes: 60     Data:  Present at session: self   Parent/Patient Concerns/reason for visit: \"I lost wt after having what was thought to be one of the 1st covid cases\"   Medical Dx/Reason for Referral: R79.89 (ICD-10-CM) - Low serum vitamin C & restrctive diet  Provider: Neftali Rose MD    Past Medical History:   Diagnosis Date    Costochondritis     Deviated septum 2023    left-with spur    Fibroid, uterine     Food poisoning 2023    Ovarian cyst     Seasonal allergies     Thyroid disease     Vitamin D deficiency        Current Outpatient Medications   Medication Sig Dispense Refill    albuterol (PROVENTIL HFA,VENTOLIN HFA) 90 mcg/act inhaler Inhale 2 puffs every 6 (six) hours as needed      ascorbic acid (VITAMIN C) 250 mg tablet Take 1,000 mg by mouth daily      EPINEPHrine (EPIPEN) 0.3 mg/0.3 mL SOAJ       ergocalciferol (VITAMIN D2) 50,000 units  (Patient not taking: Reported on 12/10/2021)      loratadine (CLARITIN) 10 mg tablet Take 10 mg by mouth daily      Synthroid 75 MCG tablet Take 1 tablet 6 days a week and no tab on Sunday. 26 tablet 0    Tirosint-SOL 75 MCG/ML SOLN 75 mcg liquid orally daily 30 mL 6     No current facility-administered medications for this visit.        Additional Meds/Supplements: N/A   Special Learning Needs/barriers to learning/any new barriers  N/A-pt thinks she may have autism   Height: 5'6 \" HC Readings from Last 5 Encounters:   No data found for HC      Weight: 93#/42.3kg  Has a scale in her home. Wt Readings from Last 10 Encounters:   12/07/23 41.7 kg (92 lb)   12/07/23 41.7 kg (92 lb)   10/13/23 43.5 kg (96 lb)   11/22/22 44.5 kg (98 lb)   11/16/22 44.9 kg (99 lb)   10/27/21 46.7 kg (103 lb)   07/15/21 43.5 kg (96 lb)   06/07/21 45.4 kg (100 lb)   04/30/21 45.4 kg (100 lb)   09/22/20 46.7 kg (103 " "lb)     Estimated body mass index is 14.85 kg/m² as calculated from the following:    Height as of 12/7/23: 5' 6\" (1.676 m).    Weight as of 12/7/23: 41.7 kg (92 lb).   Recent Weight Change: [x]Yes     []No  Amount:       Energy Needs: 0499-9609 kcal  63-85 g PRO (1.5 g/kg)   Allergies   Allergen Reactions    Metronidazole Anxiety, Dizziness, Palpitations and Shortness Of Breath    Other Anaphylaxis, Hives, Shortness Of Breath, Itching, Photosensitivity, Nausea Only, Swelling, Anxiety, Palpitations, Rash, Other (See Comments), Tinnitus, GI Intolerance, Dizziness, Headache, Tachycardia, Confusion, Chest Pain, Fatigue, Irritability, Syncope, Drowsiness and Nasal Congestion     Fluorescent and halogen lights     *Solar Urticaria- Sun and    *Pressure Urticaria    *Pesticides    Strawberry (Diagnostic) - Food Allergy Dermatitis, Hives, Itching, Rash and Shortness Of Breath    Sulfites - Food Allergy Anaphylaxis, Hives and Shortness Of Breath    Adhesive  [Medical Tape] Hives    American Cockroach Other (See Comments)    Cat Hair Extract Other (See Comments)    Dog Epithelium Allergy Skin Test Other (See Comments)    Peanut Oil - Food Allergy Other (See Comments)     \"Borderline allergy\" per pt    Penicillin G     Pollen Extract Other (See Comments)    Shellfish-Derived Products - Food Allergy Other (See Comments)    Shrimp Extract Allergy Skin Test - Food Allergy Other (See Comments)    or intolerances When younger-was allergic to strawberries; 20 years ago had hives once when had frozen fish-thinks from sulfites;    Social History     Substance and Sexual Activity   Alcohol Use No       Social History     Tobacco Use   Smoking Status Never   Smokeless Tobacco Never       Who shops? patient   Who cooks/cooking methods/Eating out/take out habits   patient     Exercise:      Other: ie: Sleep habits/ stress level/ work habits household-lives with ?/ food security    Prior Nutritional Counseling? []Yes     [x]No  When:  "     Why:         Diet Hx: eats the same things energy day  Breakfast: Little renetta cookie/cream sandwiches (2 of them)  Or  Johann chocolate chip muffin top-sometimes adds butter   Lunch: American cheese (2 slices) & [employer] butter crackers (8-10) & 1 pint          Dinner: 1 c of milk + 1-2 pepperApollidon farm chessman cookies  Or  Chicken nuggets or french fries (1x/wk)  Or  Dominos thin & crispy pizza (1-2x/wk)     Snacks: 1 pint of icecream per day    Other Notes/ Initial Assessment:    Within a 6mo to a year time span pt developed COVID (2020) then had to move out of apartment and developed other health issues and this is when the majority of the wt loss occurred. Pt works from home, 2 jobs. Pt also states she had her 1st wisdom tooth removal and was put under anesthesia and that also affected wt loss. Pt is supposed to have another tooth pulled at the end of March.     Pt notes a rare allergy to light as well as pesticides and weed killers (long standing allergy but it has gotten worse). States this hinders access to foods. Pt states she is a single parent of an adult disabled child-also has restrictive diet d/t autism. States her daughter also has smell issues. States she cannot have food delivered d/t pesticide allergy. Pt also developed esophagitis, intermittent, takes a long time for her to eat. States d/t health issues she cannot be tested for cancer. Pt notes seeing a therapist for years but they do not have experience with her health issues. Pt also notes bouts of nausea, also has issues with food smells and tastes--they all affect appetite and she will lose wt and not be able to gain it back. Pt denies wanting to lose wt, pt would like to eat foods again. Pt states many of her preferred snack foods have been phased out (notes cheese puffs).  Pt nervous to try new foods or try foods she has previously cut out. Pt notes her oven makes a bad smell since it is new so she doesn't use it. Pt has a new  "microwave at home but hasn't taken it out yet, states she hasn't had time to get it out and use it, states family has a toaster oven and they are trying to \"burn it out for her\". Pt has had some blood work testing done for allergy-saw shellfish  Pt reports getting food poisoning over Thanksgiving and now is paranoid/concerned about this happening again.  Pt notes fear pf trying supplements d/t ingredients.  Pt notes she would rather try cereal as asn additional source of kcal va oral supplements. Pt states an issueis being concerned about what the ingredients of the food are but pt couldn't state what ingredients are actually ones she would avoid other than MSG and some preservatives. Pt states he was willing to try eating yogurt-she gets it for her daughter but forgets to eat it, same with string cheese    Labs of note: 12/27/23 D <14, A1C 5.7, TSH 8.41, elevated lipid panel, elevated TPO, very low vitamin C    Pt has also  cut foods out d/t feeling like they make her feel like she has marbles in her mouth and then her throat will start to constrict.     Pt would like to be able to eat chips, french fries, a lot of frozen foods and italian foods.       Pt feels she wou.[place] per conversation, lack of available way for pt to consume foods. Pt has microwave and just has to     Follow up scheduled for March 11 @ 1:30 pm-will work on pt consuming a new food during this appointment.        Nutrition Diagnosis:   Disordered eating pattern  related to multiple health issues, allergies as evidenced by wt loss, inadequate i       Any change or new dx since previous visit:     Nutrition Diagnosis:   Underweight  related to Inadequate energy intake as  evidenced by BMI       Medical Nutrition Therapy Intervention:  []Individualized Meal Plan []Understanding Lab Values   []Basic Pathophysiology of Disease []Food/Medication Interactions   []Food Diary []Exercise   []Lifestyle/Behavior Modification Techniques []Medication, " "Mechanism of Action   []Label Reading: CHO/ Na/ Fat/ other_________ []Self Blood Glucose Monitoring   []Weight/BMI Goals: gain/lose/maintain []Other -           Comprehension: []Excellent  []Very Good  [x]Good  []Fair   []Poor    Receptivity: []Excellent  []Very Good  [x]Good  []Fair   []Poor    Expected Compliance: []Excellent  []Very Good  [x]Good  []Fair   []Poor        Goals (initial) 02/29/24:  Weekend of march 9th/10 make pancakes in the microwave   2.    Set an alarm in her phone to eat a string cheese in the evening with her daughter.    3.       No follow-ups on file.  Labs:  CMP  Lab Results   Component Value Date    K 4.2 12/27/2023     12/27/2023    CO2 27 12/27/2023    BUN 7 12/27/2023    CREATININE 0.78 12/27/2023    CALCIUM 9.6 12/27/2023    AST 11 08/22/2023    ALT 6 08/22/2023    ALKPHOS 36 08/22/2023    EGFR 94 12/27/2023       BMP  Lab Results   Component Value Date    CALCIUM 9.6 12/27/2023    K 4.2 12/27/2023    CO2 27 12/27/2023     12/27/2023    BUN 7 12/27/2023    CREATININE 0.78 12/27/2023       Lipids  No results found for: \"CHOL\"  No results found for: \"HDL\"  No results found for: \"LDLCALC\"  No results found for: \"TRIG\"  No results found for: \"CHOLHDL\"    Hemoglobin A1C  Lab Results   Component Value Date    HGBA1C 5.7 (H) 12/27/2023       Fasting Glucose  No results found for: \"GLUF\"    Insulin     Thyroid  Lab Results   Component Value Date    TSH 8.41 (H) 12/27/2023       Hepatic Function Panel  Lab Results   Component Value Date    ALT 6 08/22/2023    AST 11 08/22/2023    ALKPHOS 36 08/22/2023       Celiac Disease Antibody Panel  No results found for: \"ENDOMYSIAL IGA\", \"GLIADIN IGA\", \"GLIADIN IGG\", \"IGA\", \"TISSUE TRANSGLUT AB\", \"TTG IGA\"   Iron  No results found for: \"IRON\", \"TIBC\", \"FERRITIN\"         Tiffany Mijares RD, LDN  [facility] CLINICAL NUTRITION [facility]  [address] [place] [phone]    "

## 2024-02-29 NOTE — PATIENT INSTRUCTIONS
Goals (initial) 02/29/24:  Weekend of march 9th/10 make pancakes in the microwave   2.    Set an alarm in her phone to eat a string cheese in the evening with her daughter.    3.         Yogurts to look into:  Sherin Stafford Siggis

## 2024-03-11 ENCOUNTER — CLINICAL SUPPORT (OUTPATIENT)
Dept: NUTRITION | Facility: HOSPITAL | Age: 49
End: 2024-03-11
Payer: COMMERCIAL

## 2024-03-11 DIAGNOSIS — R63.4 WEIGHT LOSS, UNINTENTIONAL: Primary | ICD-10-CM

## 2024-03-11 PROCEDURE — 97803 MED NUTRITION INDIV SUBSEQ: CPT

## 2024-03-11 NOTE — PATIENT INSTRUCTIONS
Goal  1) within the next week try sneha mac & cheese or full fat yogurt    Aim to add something extra to breakfast like a yogurt, wheathins.  Try putting butter on crackers/muffins.  Try drinking 1/2 and 1/2 for milk.

## 2024-03-11 NOTE — PROGRESS NOTES
" Nutrition Assessment Form    Patient Name: Cate Peña    YOB: 1975    Sex: Female     Assessment Date: 3/11/2024  Start Time:  1:30 pm Stop Time: 2:00 pm Total Minutes: 30     Data:  Present at session: self   Parent/Patient Concerns/reason for visit: \"I lost wt after having what was thought to be one of the 1st covid cases\"   Medical Dx/Reason for Referral: R79.89 (ICD-10-CM) - Low serum vitamin C & restrctive diet  Provider: Neftali Rose MD    Past Medical History:   Diagnosis Date    Costochondritis     Deviated septum 2023    left-with spur    Fibroid, uterine     Food poisoning 2023    Ovarian cyst     Seasonal allergies     Thyroid disease     Vitamin D deficiency        Current Outpatient Medications   Medication Sig Dispense Refill    albuterol (PROVENTIL HFA,VENTOLIN HFA) 90 mcg/act inhaler Inhale 2 puffs every 6 (six) hours as needed      ascorbic acid (VITAMIN C) 250 mg tablet Take 1,000 mg by mouth daily      EPINEPHrine (EPIPEN) 0.3 mg/0.3 mL SOAJ       ergocalciferol (VITAMIN D2) 50,000 units  (Patient not taking: Reported on 12/10/2021)      loratadine (CLARITIN) 10 mg tablet Take 10 mg by mouth daily      Synthroid 75 MCG tablet Take 1 tablet 6 days a week and no tab on Sunday. 26 tablet 0    Tirosint-SOL 75 MCG/ML SOLN 75 mcg liquid orally daily 30 mL 6     No current facility-administered medications for this visit.        Additional Meds/Supplements: N/A   Special Learning Needs/barriers to learning/any new barriers  N/A-pt thinks she may have autism   Height: 5'6 \" HC Readings from Last 5 Encounters:   No data found for HC      Weight: Did not update wt today  93#/42.3kg  Has a scale in her home. Wt Readings from Last 10 Encounters:   02/29/24 42.2 kg (93 lb)   12/07/23 41.7 kg (92 lb)   12/07/23 41.7 kg (92 lb)   10/13/23 43.5 kg (96 lb)   11/22/22 44.5 kg (98 lb)   11/16/22 44.9 kg (99 lb)   10/27/21 46.7 kg (103 lb)   07/15/21 43.5 kg (96 lb)   06/07/21 45.4 kg (100 lb) " "  04/30/21 45.4 kg (100 lb)     Estimated body mass index is 15.01 kg/m² as calculated from the following:    Height as of 2/29/24: 5' 6\" (1.676 m).    Weight as of 2/29/24: 42.2 kg (93 lb).   Recent Weight Change: N/A did not update wt today  []Yes     []No  Amount:       Energy Needs: 4157-8350 kcal (30-35 g/kg)  63-84 g PRO (1.2-1.5 g/kg)   Allergies   Allergen Reactions    Metronidazole Anxiety, Dizziness, Palpitations and Shortness Of Breath    Other Anaphylaxis, Hives, Shortness Of Breath, Itching, Photosensitivity, Nausea Only, Swelling, Anxiety, Palpitations, Rash, Other (See Comments), Tinnitus, GI Intolerance, Dizziness, Headache, Tachycardia, Confusion, Chest Pain, Fatigue, Irritability, Syncope, Drowsiness and Nasal Congestion     Fluorescent and halogen lights     *Solar Urticaria- Sun and    *Pressure Urticaria    *Pesticides    Strawberry (Diagnostic) - Food Allergy Dermatitis, Hives, Itching, Rash and Shortness Of Breath    Sulfites - Food Allergy Anaphylaxis, Hives and Shortness Of Breath    Adhesive  [Medical Tape] Hives    American Cockroach Other (See Comments)    Cat Hair Extract Other (See Comments)    Dog Epithelium Allergy Skin Test Other (See Comments)    Peanut Oil - Food Allergy Other (See Comments)     \"Borderline allergy\" per pt    Penicillin G     Pollen Extract Other (See Comments)    Shellfish-Derived Products - Food Allergy Other (See Comments)    Shrimp Extract Allergy Skin Test - Food Allergy Other (See Comments)    or intolerances When younger-was allergic to strawberries; 20 years ago had hives once when had frozen fish-thinks from sulfites;    Social History     Substance and Sexual Activity   Alcohol Use No       Social History     Tobacco Use   Smoking Status Never   Smokeless Tobacco Never       Who shops? patient   Who cooks/cooking methods/Eating out/take out habits   patient     Exercise:      Other: ie: Sleep habits/ stress level/ work habits household-lives with ?/ food " security    Prior Nutritional Counseling? []Yes     [x]No  When:      Why:         Diet Hx: eats the same things energy day  Breakfast: Little renetta cookie/cream sandwiches (2 of them)  Or  Johann chocolate chip muffin top-sometimes adds butter   Lunch: American cheese (2 slices) & [employer] farm butter crackers (8-10) & 1 pint          Dinner: 1 c of milk + 1-2 pepperidge farm chessman cookies  Or  Chicken nuggets or french fries (1x/wk)  Or  Dominos thin & crispy pizza (1-2x/wk)     Snacks: 1 pint of icecream per day    Other Notes/ Initial Assessment:    24    Pt stated she was a little late to the appointment, had a work meeting and stated she was eating her pint of ice cream and it takes her a long time to eat. Pt did not set up her microwave, states that she had to order some new clothes and the chemicals in the packaging gave her an allergic reaction and created a smell in her house-pt was also concerned maybe there was a mouse that  in the wall. So pt stated that those things plus more contributed to the barriers as to why she didn't meet her goals.     States she had to order new clothes   Pt stated she was able to try the string cheese today but doesn't think she can try a new food today. States they are spraying outside and worried if she has a reaction she wouldn't be able to go outside to go to the hospital. Pt states even if it is a food she has had in the past-what if the company changed their ingredients or what if she develops a new allergy. Pt did have some Boars head and Sargento cheese-that she has had in the past-since she couldn't find her preferred cheeses. States she had to go out to a 3rd store and being out in the sun and just being outside more made her exhausted. Pt was also trying to check out different areas of the store and feels the different parts of the store had different lighting and that affected her.  She also felt was a bonus she picked up some more yogurt and hopes  to try it in the next few days-even got raspberry instead of just vanilla.  Wheat thins*    Pt states she was ok with this writer talking with her therapist who she has been seeing over the last 20 years but pt states her therapist is so busy and testifies [place] the [place] so she may not be able to get back to this writer. Pt would prefer not to tell this writer the name of their therapist (out of ne to reach out to there therapist and pass on this writer's Therapist has helped pt with working with OCD r/t transporting foods from the store to help reduce the fear of food borne illness.       Discussed concerns about pt with adequate nutrition after having dental procedure with not as likely to be going out to get food and she will likely have limitations with food textures, she also may not be feeling well and appetite may decrease.  Pt feels it takes her longer to eat lunch and dinner.  Pt thinks that she may be more likely to increase her intake at breakfast-states she may be more likely to work more food into that meal.  When it came to talking about trying new foods and being specific about it when she can try a food, pt stated she understood SMART goals but that she just never knows.  Pt was willing to set a goal-see goal. Once pt is healed will discuss trying new foods, the main goal until then is adequate kcal-pt agreeable with this.     F/U the day after her procedure-as pt request-03/26/24  ____________________________________________________________________________________________________________________________________________________________________________________________________________________________  02/29/24    Within a 6mo to a year time span pt developed COVID (2020) then had to move out of apartment and developed other health issues and this is when the majority of the wt loss occurred. Pt works from home, 2 jobs. Pt also states she had her 1st wisdom tooth removal and was put under anesthesia  "and that also affected wt loss. Pt is supposed to have another tooth pulled at the end of March.     Pt notes a rare allergy to light as well as pesticides and weed killers (long standing allergy but it has gotten worse). States this hinders access to foods. Pt states she is a single parent of an adult disabled child-also has restrictive diet d/t autism. States her daughter also has smell issues. States she cannot have food delivered d/t pesticide allergy. Pt also developed esophagitis, intermittent, takes a long time for her to eat. States d/t health issues she cannot be tested for cancer. Pt notes seeing a therapist for years but they do not have experience with her health issues. Pt also notes bouts of nausea, also has issues with food smells and tastes--they all affect appetite and she will lose wt and not be able to gain it back. Pt denies wanting to lose wt, pt would like to eat foods again. Pt states many of her preferred snack foods have been phased out (notes cheese puffs).  Pt nervous to try new foods or try foods she has previously cut out. Pt notes her oven makes a bad smell since it is new so she doesn't use it. Pt has a new microwave at home but hasn't taken it out yet, states she hasn't had time to get it out and use it, states family has a toaster oven and they are trying to \"burn it out for her\". Pt has had some blood work testing done for allergy-saw shellfish  Pt reports getting food poisoning over Thanksgiving and now is paranoid/concerned about this happening again.  Pt notes fear of trying supplements d/t ingredients.  Pt notes she would rather try cereal as an additional source of kcal va oral supplements. Pt states an issue is being concerned about what the ingredients of the food are but pt couldn't state what ingredients are actually ones she would avoid other than MSG and some preservatives. Pt states he was willing to try eating yogurt-she gets it for her daughter but forgets to eat it, " same with string cheese.    As per meeting and diet hx ps is observed to meet the criteria for ARFID. Pt with significant wt loss     Labs of note: 12/27/23 D <14, A1C 5.7, TSH 8.41, elevated lipid panel, elevated TPO, very low vitamin C    Pt has also cut foods out d/t feeling like they make her feel like she has marbles in her mouth and then her throat will start to constrict.     Pt would like to be able to eat chips, french fries, a lot of frozen foods and italian foods.     Pt feels she would  As per conversation, lack of available way for pt to consume foods. Pt has microwave and just has to wash and put in the turntable. Most of the appointment today was gather information from pt, build rapport, discuss possible routes to go moving forward.  See goals.     Follow up scheduled for March 11 @ 1:30 pm-will work on pt consuming a new food during this appointment.        Nutrition Diagnosis:   Disordered eating pattern  related to multiple health issues, allergies as evidenced by wt loss, inadequate i       Any change or new dx since previous visit:     Nutrition Diagnosis:   Underweight  related to Inadequate energy intake as  evidenced by BMI       Medical Nutrition Therapy Intervention:  [x]Individualized Meal Plan  8539-5548 kcal (30-35 g/kg)  63-84 g PRO (1.2-1.5 g/kg) []Understanding Lab Values   []Basic Pathophysiology of Disease []Food/Medication Interactions   []Food Diary [x]Exercise: Not necessarily encouraged for now until wt stabilizes   [x]Lifestyle/Behavior Modification Techniques:  incorporating new foods, increasing intake at meals, incorporating snacks.  []Medication, Mechanism of Action   []Label Reading: CHO/ Na/ Fat/ other_________ []Self Blood Glucose Monitoring   [x]Weight/BMI Goals: gain/lose/maintain: Avoid wt loss []Other -           Comprehension: []Excellent  []Very Good  [x]Good  []Fair   []Poor    Receptivity: []Excellent  []Very Good  [x]Good  []Fair   []Poor    Expected Compliance:  "[]Excellent  []Very Good  [x]Good  []Fair   []Poor          Goals (initial) 03/11/24:  Within the next week try either Julia mac & cheese   2.       3.     Goals (initial) 02/29/24:  Weekend of march 9th/10 make pancakes in the microwave   2.    Set an alarm in her phone to eat a string cheese in the evening with her daughter.    3.       No follow-ups on file.  Labs:  CMP  Lab Results   Component Value Date    K 4.2 12/27/2023     12/27/2023    CO2 27 12/27/2023    BUN 7 12/27/2023    CREATININE 0.78 12/27/2023    CALCIUM 9.6 12/27/2023    AST 11 08/22/2023    ALT 6 08/22/2023    ALKPHOS 36 08/22/2023    EGFR 94 12/27/2023       BMP  Lab Results   Component Value Date    CALCIUM 9.6 12/27/2023    K 4.2 12/27/2023    CO2 27 12/27/2023     12/27/2023    BUN 7 12/27/2023    CREATININE 0.78 12/27/2023       Lipids  No results found for: \"CHOL\"  No results found for: \"HDL\"  No results found for: \"LDLCALC\"  No results found for: \"TRIG\"  No results found for: \"CHOLHDL\"    Hemoglobin A1C  Lab Results   Component Value Date    HGBA1C 5.7 (H) 12/27/2023       Fasting Glucose  No results found for: \"GLUF\"    Insulin     Thyroid  Lab Results   Component Value Date    TSH 8.41 (H) 12/27/2023       Hepatic Function Panel  Lab Results   Component Value Date    ALT 6 08/22/2023    AST 11 08/22/2023    ALKPHOS 36 08/22/2023       Celiac Disease Antibody Panel  No results found for: \"ENDOMYSIAL IGA\", \"GLIADIN IGA\", \"GLIADIN IGG\", \"IGA\", \"TISSUE TRANSGLUT AB\", \"TTG IGA\"   Iron  No results found for: \"IRON\", \"TIBC\", \"FERRITIN\"         Tiffany Mijares RD, LDN  [facility] CLINICAL NUTRITION SERVICES  [address] [place] [phone]    "

## 2024-03-26 ENCOUNTER — CLINICAL SUPPORT (OUTPATIENT)
Dept: NUTRITION | Facility: HOSPITAL | Age: 49
End: 2024-03-26
Payer: COMMERCIAL

## 2024-03-26 DIAGNOSIS — R63.4 WEIGHT LOSS, UNINTENTIONAL: Primary | ICD-10-CM

## 2024-03-26 PROCEDURE — 97803 MED NUTRITION INDIV SUBSEQ: CPT

## 2024-03-26 NOTE — PROGRESS NOTES
" Nutrition Assessment Form    Patient Name: Cate Peña    YOB: 1975    Sex: Female     Assessment Date: 3/26/2024  Start Time:  1:30 pm Stop Time: 2:00 pm Total Minutes: 30     Data:  Present at session: self   Parent/Patient Concerns/reason for visit: \"I am doing ok after my dental procedure yesterday\"   Medical Dx/Reason for Referral: R79.89 (ICD-10-CM) - Low serum vitamin C & restrctive diet  Provider: Neftali Rose MD    Past Medical History:   Diagnosis Date    Costochondritis     Deviated septum 2023    left-with spur    Fibroid, uterine     Food poisoning 2023    Ovarian cyst     Seasonal allergies     Thyroid disease     Vitamin D deficiency        Current Outpatient Medications   Medication Sig Dispense Refill    albuterol (PROVENTIL HFA,VENTOLIN HFA) 90 mcg/act inhaler Inhale 2 puffs every 6 (six) hours as needed      ascorbic acid (VITAMIN C) 250 mg tablet Take 1,000 mg by mouth daily      EPINEPHrine (EPIPEN) 0.3 mg/0.3 mL SOAJ       ergocalciferol (VITAMIN D2) 50,000 units  (Patient not taking: Reported on 12/10/2021)      loratadine (CLARITIN) 10 mg tablet Take 10 mg by mouth daily      Synthroid 75 MCG tablet Take 1 tablet 6 days a week and no tab on Sunday. 26 tablet 0    Tirosint-SOL 75 MCG/ML SOLN 75 mcg liquid orally daily 30 mL 6     No current facility-administered medications for this visit.        Additional Meds/Supplements: N/A   Special Learning Needs/barriers to learning/any new barriers  N/A-pt thinks she may have autism   Height: 5'6 \" HC Readings from Last 5 Encounters:   No data found for HC      Weight: Did not update wt today--pt nervous to weigh herself recent d/t being fearful that she would have lost wt.    Wt Readings from Last 10 Encounters:   02/29/24 42.2 kg (93 lb)   12/07/23 41.7 kg (92 lb)   12/07/23 41.7 kg (92 lb)   10/13/23 43.5 kg (96 lb)   11/22/22 44.5 kg (98 lb)   11/16/22 44.9 kg (99 lb)   10/27/21 46.7 kg (103 lb)   07/15/21 43.5 kg (96 lb) " "  06/07/21 45.4 kg (100 lb)   04/30/21 45.4 kg (100 lb)     Estimated body mass index is 15.01 kg/m² as calculated from the following:    Height as of 2/29/24: 5' 6\" (1.676 m).    Weight as of 2/29/24: 42.2 kg (93 lb).   Recent Weight Change: N/A did not update wt today  []Yes     []No  Amount:       Energy Needs: 6750-9349 kcal (30-35 g/kg)  63-84 g PRO (1.2-1.5 g/kg)   Allergies   Allergen Reactions    Metronidazole Anxiety, Dizziness, Palpitations and Shortness Of Breath    Other Anaphylaxis, Hives, Shortness Of Breath, Itching, Photosensitivity, Nausea Only, Swelling, Anxiety, Palpitations, Rash, Other (See Comments), Tinnitus, GI Intolerance, Dizziness, Headache, Tachycardia, Confusion, Chest Pain, Fatigue, Irritability, Syncope, Drowsiness and Nasal Congestion     Fluorescent and halogen lights     *Solar Urticaria- Sun and    *Pressure Urticaria    *Pesticides    Strawberry (Diagnostic) - Food Allergy Dermatitis, Hives, Itching, Rash and Shortness Of Breath    Sulfites - Food Allergy Anaphylaxis, Hives and Shortness Of Breath    Adhesive  [Medical Tape] Hives    American Cockroach Other (See Comments)    Cat Hair Extract Other (See Comments)    Dog Epithelium Allergy Skin Test Other (See Comments)    Peanut Oil - Food Allergy Other (See Comments)     \"Borderline allergy\" per pt    Penicillin G     Pollen Extract Other (See Comments)    Shellfish-Derived Products - Food Allergy Other (See Comments)    Shrimp Extract Allergy Skin Test - Food Allergy Other (See Comments)    or intolerances When younger-was allergic to strawberries; 20 years ago had hives once when had frozen fish-thinks from sulfites;    Social History     Substance and Sexual Activity   Alcohol Use No       Social History     Tobacco Use   Smoking Status Never   Smokeless Tobacco Never       Who shops? patient   Who cooks/cooking methods/Eating out/take out habits   patient     Exercise: Non at this time.     Other: ie: Sleep habits/ stress " level/ work habits household-lives with ?/ food security    Prior Nutritional Counseling? [x]Yes     []No  When: 03/11/24      Why: Follow up with this writer        Diet Hx: eats the same things energy day  Breakfast: Little renetta cookie/cream sandwiches (2 of them)  Or  Johann chocolate chip muffin top-sometimes adds butter   Lunch: American cheese (2 slices) & [employer] farm butter crackers (8-10) & 1 pint          Dinner: 1 c of milk + 1-2 pepperidge farm chessman cookies  Or  Chicken nuggets or french fries (1x/wk)  Or  Dominos thin & crispy pizza (1-2x/wk)     Snacks: 1 pint of icecream per day    Other Notes/ Initial Assessment:    03/26/24    Pt with a tooth removed yesterday. States she had a cup of milk last night and did ok with that. This morning pt had some yogurt and did ok with it as well.  Prior to procedure pt did purchase some more yogurt, milk, ice cream.      Overall pt has been doing ok since the procedure, didn't go under general anesthesia but took benadryl instead and is still feeling a little woozy.     Pt states she may ask her mom to  [employer] mac & cheese, she has gotten some frozen mac & cheese but hasn't tried the microwave yet.  Pt notes lack of support as being a hindrance to things like trying different take out, getting her toaster oven/microwave working/.     Pt reports she is feeling hungry--which is weird for her since she doesn't usually feel hungry.  Pt is excited for the tooth to heal so she can chew on that side again (hasn't been able to try without pain in almost a year).     Since last appointment pt wasn't able to try the Julia mac & cheese but has retried cheeses and some other cheeses she hasn't tried in a bit (trying different brands), also different types of chocolate cookies.     Pt states she has an appointment with her therapist this week for the first time in 2 weeks. States she still isnt sure how helpful it will be for this writer to talk to her therapist  "also given how busy she says her therapist is.     Pt has been trying to monitor fluids-milk, water, to help prevent dehydration.   Pt plans to try small smears of cream cheese, smushing up american cheese. Pt has cake she wants to try-suggested in a day or so maybe to try letting it soak in milk to get softer. Discussed different ways to try and get electrolytes.     Pt hasn't weighted herself yet, states she was nervous to weigh herself prior to the procedure incase she had lost some wt and it made her stressed out. Pt isnt sure at this time when she plans to weigh herself again-this writer suggested maybe not until she is back to her \"regular\" eating habits  for about a week unless she starts to feel ill or notices her clothes feeling looser.      Next follow up scheduled: 24      _____________________________________________________________________________________________________________________  24    Pt stated she was a little late to the appointment, had a work meeting and stated she was eating her pint of ice cream and it takes her a long time to eat. Pt did not set up her microwave, states that she had to order some new clothes and the chemicals in the packaging gave her an allergic reaction and created a smell in her house-pt was also concerned maybe there was a mouse that  in the wall. So pt stated that those things plus more contributed to the barriers as to why she didn't meet her goals.     States she had to order new clothes   Pt stated she was able to try the string cheese today but doesn't think she can try a new food today. States they are spraying outside and worried if she has a reaction she wouldn't be able to go outside to go to the hospital. Pt states even if it is a food she has had in the past-what if the company changed their ingredients or what if she develops a new allergy. Pt did have some Boars head and Sargento cheese-that she has had in the past-since she couldn't find " her preferred cheeses. States she had to go out to a 3rd store and being out in the sun and just being outside more made her exhausted. Pt was also trying to check out different areas of the store and feels the different parts of the store had different lighting and that affected her.  She also felt was a bonus she picked up some more yogurt and hopes to try it in the next few days-even got raspberry instead of just vanilla.  Wheat thins*    Pt states she was ok with this writer talking with her therapist who she has been seeing over the last 20 years but pt states her therapist is so busy and testifies [place] so she may not be able to get back to this writer. Pt would prefer not to tell this writer the name of their therapist (out of ne to reach out to there therapist and pass on this writer's Therapist has helped pt with working with OCD r/t transporting foods from the store to help reduce the fear of food borne illness.       Discussed concerns about pt with adequate nutrition after having dental procedure with not as likely to be going out to get food and she will likely have limitations with food textures, she also may not be feeling well and appetite may decrease.  Pt feels it takes her longer to eat lunch and dinner.  Pt thinks that she may be more likely to increase her intake at breakfast-states she may be more likely to work more food into that meal.  When it came to talking about trying new foods and being specific about it when she can try a food, pt stated she understood SMART goals but that she just never knows.  Pt was willing to set a goal-see goal. Once pt is healed will discuss trying new foods, the main goal until then is adequate kcal-pt agreeable with this.     F/U the day after her procedure-as pt  "request-03/26/24  ____________________________________________________________________________________________________________________________________________________________________________________________________________________________  02/29/24    Within a 6mo to a year time span pt developed COVID (2020) then had to move out of apartment and developed other health issues and this is when the majority of the wt loss occurred. Pt works from home, 2 jobs. Pt also states she had her 1st wisdom tooth removal and was put under anesthesia and that also affected wt loss. Pt is supposed to have another tooth pulled at the end of March.     Pt notes a rare allergy to light as well as pesticides and weed killers (long standing allergy but it has gotten worse). States this hinders access to foods. Pt states she is a single parent of an adult disabled child-also has restrictive diet d/t autism. States her daughter also has smell issues. States she cannot have food delivered d/t pesticide allergy. Pt also developed esophagitis, intermittent, takes a long time for her to eat. States d/t health issues she cannot be tested for cancer. Pt notes seeing a therapist for years but they do not have experience with her health issues. Pt also notes bouts of nausea, also has issues with food smells and tastes--they all affect appetite and she will lose wt and not be able to gain it back. Pt denies wanting to lose wt, pt would like to eat foods again. Pt states many of her preferred snack foods have been phased out (notes cheese puffs).  Pt nervous to try new foods or try foods she has previously cut out. Pt notes her oven makes a bad smell since it is new so she doesn't use it. Pt has a new microwave at home but hasn't taken it out yet, states she hasn't had time to get it out and use it, states family has a [employer] oven and they are trying to \"burn it out for her\". Pt has had some blood work testing done for allergy-saw shellfish  Pt " reports getting food poisoning over Thanksgiving and now is paranoid/concerned about this happening again.  Pt notes fear of trying supplements d/t ingredients.  Pt notes she would rather try cereal as an additional source of kcal va oral supplements. Pt states an issue is being concerned about what the ingredients of the food are but pt couldn't state what ingredients are actually ones she would avoid other than MSG and some preservatives. Pt states he was willing to try eating yogurt-she gets it for her daughter but forgets to eat it, same with string cheese.    As per meeting and diet hx ps is observed to meet the criteria for ARFID. Pt with significant wt loss     Labs of note: 12/27/23 D <14, A1C 5.7, TSH 8.41, elevated lipid panel, elevated TPO, very low vitamin C    Pt has also cut foods out d/t feeling like they make her feel like she has marbles in her mouth and then her throat will start to constrict.     Pt would like to be able to eat chips, french fries, a lot of frozen foods and italian foods.     Pt feels she would  As per conversation, lack of available way for pt to consume foods. Pt has microwave and just has to wash and put in the turntable. Most of the appointment today was gather information from pt, build rapport, discuss possible routes to go moving forward.  See goals.     Follow up scheduled for March 11 @ 1:30 pm-will work on pt consuming a new food during this appointment.        Nutrition Diagnosis:   Disordered eating pattern  related to multiple health issues, allergies as evidenced by wt loss, inadequate i       Any change or new dx since previous visit:     Nutrition Diagnosis:   Underweight  related to Inadequate energy intake as  evidenced by BMI       Medical Nutrition Therapy Intervention:  [x]Individualized Meal Plan  2077-6726 kcal (30-35 g/kg)  63-84 g PRO (1.2-1.5 g/kg) []Understanding Lab Values   []Basic Pathophysiology of Disease []Food/Medication Interactions   []Food  "Diary [x]Exercise: Not necessarily encouraged for now until wt stabilizes   [x]Lifestyle/Behavior Modification Techniques:  incorporating new foods, increasing intake at meals, incorporating snacks.  []Medication, Mechanism of Action   []Label Reading: CHO/ Na/ Fat/ other_________ []Self Blood Glucose Monitoring   [x]Weight/BMI Goals: gain/lose/maintain: Avoid wt loss []Other -           Comprehension: []Excellent  []Very Good  [x]Good  []Fair   []Poor    Receptivity: []Excellent  []Very Good  [x]Good  []Fair   []Poor    Expected Compliance: []Excellent  []Very Good  [x]Good  []Fair   []Poor          Goals (initial) 03/26/24:  Starting day 2/3 post procedure to consume 2 slices of cheese per day   2.    Pt will ask her mom to bring her [employer] mac & cheese    3.       Goals (initial) 03/11/24:  Within the next week try either Julia mac & cheese or another type of mac & cheese   2.       3.     Goals (initial) 02/29/24:  Weekend of march 9th/10 make pancakes in the microwave   2.    Set an alarm in her phone to eat a string cheese in the evening with her daughter.    3.     Labs:  CMP  Lab Results   Component Value Date    K 4.2 12/27/2023     12/27/2023    CO2 27 12/27/2023    BUN 7 12/27/2023    CREATININE 0.78 12/27/2023    CALCIUM 9.6 12/27/2023    AST 11 08/22/2023    ALT 6 08/22/2023    ALKPHOS 36 08/22/2023    EGFR 94 12/27/2023       BMP  Lab Results   Component Value Date    CALCIUM 9.6 12/27/2023    K 4.2 12/27/2023    CO2 27 12/27/2023     12/27/2023    BUN 7 12/27/2023    CREATININE 0.78 12/27/2023       Lipids  No results found for: \"CHOL\"  No results found for: \"HDL\"  No results found for: \"LDLCALC\"  No results found for: \"TRIG\"  No results found for: \"CHOLHDL\"    Hemoglobin A1C  Lab Results   Component Value Date    HGBA1C 5.7 (H) 12/27/2023       Fasting Glucose  No results found for: \"GLUF\"    Insulin     Thyroid  Lab Results   Component Value Date    TSH 8.41 (H) 12/27/2023       Hepatic " "Function Panel  Lab Results   Component Value Date    ALT 6 08/22/2023    AST 11 08/22/2023    ALKPHOS 36 08/22/2023       Celiac Disease Antibody Panel  No results found for: \"ENDOMYSIAL IGA\", \"GLIADIN IGA\", \"GLIADIN IGG\", \"IGA\", \"TISSUE TRANSGLUT AB\", \"TTG IGA\"   Iron  Lab Results   Component Value Date    IRON 71 05/01/2021    TIBC 394 05/01/2021            Tiffany Mijares RD, LDN  [facility]  [address] [place] [phone]    "

## 2024-04-02 ENCOUNTER — NURSE TRIAGE (OUTPATIENT)
Age: 49
End: 2024-04-02

## 2024-04-02 NOTE — TELEPHONE ENCOUNTER
"SPOKE WITH PT, HX GERD, WEIGHT LOSS, UNINTENTIONAL, ARFID. PT SCHEDULED TO SEE YOU THIS FRIDAY, UNSURE IF VIRTUAL OR PHYSICAL DUE TO RECENT PESTICIDE SPRAYED OUTSIDE OF HER HOME, SHE IS SEVERELY ALLERGIC. PT WANTED YOU TO KNOW SHE IS CONCERNED HER WEIGHT HAS DROPPED FROM 93LBS TO 90LBS IN THE PAST WEEK SINCE HAVING WISDOM TOOTH EXTRACTION, CAUSING LIMITED ORAL INTAKE. PT IS WORKING WITH RECOMMENDED DIETICIAN TO INCREASE CALORIC INTAKE. CURRENTLY PT IS EATING CHEESE, YOGURT, ICE CREAM, MILK, WATER. PT REPORTS BM WHICH IS TYPICALLY \"SMALL BROWN BALLS\" DID NOTICE SMALL WHITE FLECKS, UNSURE IF IT WAS MUCOUS. PT ADVISED TO MONITOR AT THIS TIME. PT DID NOT HAVE BLOOD WORK DONE OF IMAGING COMPLETED YET, ALSO CONCERNED ABOUT RECOMMENDED EGD/COLONOSCOPY SINCE HER WEIGHT IS SO LOW. PT ADVISED TO DISCUSS THIS AT OFFICE VISIT.                             Reason for Disposition   Information only question and nurse able to answer    Answer Assessment - Initial Assessment Questions  1. REASON FOR CALL or QUESTION: \"What is your reason for calling today?\" or \"How can I best help you?\" or \"What question do you have that I can help answer?\"      SPOKE WITH PT, HX GERD, WEIGHT LOSS, UNINTENTIONAL, ARFID. PT SCHEDULED TO SEE YOU THIS FRIDAY, UNSURE IF VIRTUAL OR PHYSICAL DUE TO RECENT PESTICIDE SPRAYED OUTSIDE OF HER HOME, SHE IS SEVERELY ALLERGIC. PT WANTED YOU TO KNOW SHE IS CONCERNED HER WEIGHT HAS DROPPED FROM 93LBS TO 90LBS IN THE PAST WEEK SINCE HAVING WISDOM TOOTH EXTRACTION, CAUSING LIMITED ORAL INTAKE. PT IS WORKING WITH RECOMMENDED DIETICIAN TO INCREASE CALORIC INTAKE. CURRENTLY PT IS EATING CHEESE, YOGURT, ICE CREAM, MILK, WATER. PT REPORTS BM WHICH IS TYPICALLY \"SMALL BROWN BALLS\" DID NOTICE SMALL WHITE FLECKS, UNSURE IF IT WAS MUCOUS. PT ADVISED TO MONITOR AT THIS TIME. PT DID NOT HAVE BLOOD WORK DONE OF IMAGING COMPLETED YET, ALSO CONCERNED ABOUT RECOMMENDED EGD/COLONOSCOPY SINCE HER WEIGHT IS SO LOW. PT ADVISED TO " DISCUSS THIS AT OFFICE VISIT.    Protocols used: Information Only Call - No Triage-ADULT-[place]

## 2024-04-05 ENCOUNTER — TELEMEDICINE (OUTPATIENT)
Dept: GASTROENTEROLOGY | Facility: CLINIC | Age: 49
End: 2024-04-05
Payer: COMMERCIAL

## 2024-04-05 VITALS — WEIGHT: 90 LBS | HEIGHT: 66 IN | BODY MASS INDEX: 14.46 KG/M2

## 2024-04-05 DIAGNOSIS — K21.9 GASTROESOPHAGEAL REFLUX DISEASE WITHOUT ESOPHAGITIS: ICD-10-CM

## 2024-04-05 DIAGNOSIS — R13.19 ESOPHAGEAL DYSPHAGIA: ICD-10-CM

## 2024-04-05 DIAGNOSIS — R63.4 WEIGHT LOSS, UNINTENTIONAL: Primary | ICD-10-CM

## 2024-04-05 DIAGNOSIS — D50.9 IRON DEFICIENCY ANEMIA, UNSPECIFIED IRON DEFICIENCY ANEMIA TYPE: ICD-10-CM

## 2024-04-05 PROCEDURE — 99214 OFFICE O/P EST MOD 30 MIN: CPT | Performed by: INTERNAL MEDICINE

## 2024-04-05 NOTE — PROGRESS NOTES
Virtual Regular Visit    Verification of patient location:    Patient is located at Home in the following state in which I hold an active license [place]      Assessment/Plan:    Problem List Items Addressed This Visit          Digestive    Gastroesophageal reflux disease without esophagitis    Esophageal dysphagia       Blood    Iron deficiency anemia       Other    Weight loss, unintentional - Primary     1. Gastroesophageal reflux disease without esophagitis  2. Esophageal dysphagia  She has reflux and dysphagia symptoms and I had recommended an upper endoscopy but she does not feel comfortable doing this because of her solar urticaria and light sensitivity.  I encouraged her to schedule the upper endoscopy when she feels comfortable.  I asked her to keep a food diary and monitor and correlate her food intake with any recurrent symptoms.  Also I want her to see if her symptoms correlate with restarting the Tirosint.  I will schedule her for a follow-up visit in about 3 to 4 months.     3. Iron deficiency anemia, unspecified iron deficiency anemia type  4. Weight loss, unintentional  I encouraged her to follow-up for upper endoscopy and colonoscopy when she feels comfortable.  Again she is not open to scheduling these procedures at this time due to her solar urticaria and light sensitivity.       Reason for visit is   Chief Complaint   Patient presents with    Follow-up     Pt f/u to wt loss, states lost more wt and started seeing the dietician  recent oral surgery. Had a couple days of nausea before and after surgery, also experiencing ear tightness/pressure, allergist states a possible GI issue.Has had a bm with some white on the stools. Has not started thyroid meds yet.    Virtual Regular Visit          Encounter provider Jer Owusu MD    Provider located at [facility] [place]  [address]  [address]  [place]  [address]  [phone]      Recent Visits  No visits were found meeting these conditions.  Showing recent visits within past 7 days and meeting all other requirements  Today's Visits  Date Type Provider Dept   04/05/24 Telemedicine Jer Owusu MD [facility]   Showing today's visits and meeting all other requirements  Future Appointments  No visits were found meeting these conditions.  Showing future appointments within next 150 days and meeting all other requirements       The patient was identified by name and date of birth. Cate Peña was informed that this is a telemedicine visit and that the visit is being conducted through the Epic Embedded platform. She agrees to proceed..  My office door was closed. No one else was in the room.  She acknowledged consent and understanding of privacy and security of the video platform. The patient has agreed to participate and understands they can discontinue the visit at any time.    Patient is aware this is a billable service.     Subjective  Cate Peña is a 48 y.o. female continues to have difficulty gaining weight despite seeing a nutritionist and working with a nutritionist.  She has lost a small amount of weight and is down to 90 pounds now.  She has nausea but denies vomiting.  She has not had any recent constipation or diarrhea.  She continues to have mild intermittent reflux and dysphagia.        Past Medical History:   Diagnosis Date    Costochondritis     Deviated septum 2023    left-with spur    Fibroid, uterine     Food poisoning 2023    Ovarian cyst     Seasonal allergies     Thyroid disease     Vitamin D deficiency        Past Surgical History:   Procedure Laterality Date    WISDOM TOOTH EXTRACTION         Current Outpatient Medications   Medication Sig Dispense Refill    albuterol (PROVENTIL HFA,VENTOLIN HFA) 90 mcg/act inhaler Inhale 2 puffs every 6 (six) hours as needed      EPINEPHrine (EPIPEN) 0.3 mg/0.3 mL SOAJ       loratadine  "(CLARITIN) 10 mg tablet Take 10 mg by mouth daily      Synthroid 75 MCG tablet Take 1 tablet 6 days a week and no tab on Sunday. 26 tablet 0    Tirosint-SOL 75 MCG/ML SOLN 75 mcg liquid orally daily 30 mL 6    ascorbic acid (VITAMIN C) 250 mg tablet Take 1,000 mg by mouth daily (Patient not taking: Reported on 4/5/2024)      ergocalciferol (VITAMIN D2) 50,000 units  (Patient not taking: Reported on 12/10/2021)       No current facility-administered medications for this visit.        Allergies   Allergen Reactions    Metronidazole Anxiety, Dizziness, Palpitations and Shortness Of Breath    Other Anaphylaxis, Hives, Shortness Of Breath, Itching, Photosensitivity, Nausea Only, Swelling, Anxiety, Palpitations, Rash, Other (See Comments), Tinnitus, GI Intolerance, Dizziness, Headache, Tachycardia, Confusion, Chest Pain, Fatigue, Irritability, Syncope, Drowsiness and Nasal Congestion     Fluorescent and halogen lights     *Solar Urticaria- Sun and    *Pressure Urticaria    *Pesticides    Strawberry (Diagnostic) - Food Allergy Dermatitis, Hives, Itching, Rash and Shortness Of Breath    Sulfites - Food Allergy Anaphylaxis, Hives and Shortness Of Breath    Adhesive  [Medical Tape] Hives    American Cockroach Other (See Comments)    Cat Hair Extract Other (See Comments)    Dog Epithelium (Canis Lupus Familiaris) Other (See Comments)    Peanut Oil - Food Allergy Other (See Comments)     \"Borderline allergy\" per pt    Penicillin G     Pollen Extract Other (See Comments)    Shellfish-Derived Products - Food Allergy Other (See Comments)    Shrimp Extract Allergy Skin Test - Food Allergy Other (See Comments)       REVIEW OF SYSTEMS:    CONSTITUTIONAL: Denies any fever, chills, rigors, and weight loss.  HEENT: No earache or tinnitus. Denies hearing loss or visual disturbances.  CARDIOVASCULAR: No chest pain or palpitations.   RESPIRATORY: Denies any cough, hemoptysis, shortness of breath or dyspnea on exertion.  GASTROINTESTINAL: " "As noted in the History of Present Illness.   GENITOURINARY: No problems with urination. Denies any hematuria or dysuria.  NEUROLOGIC: No dizziness or vertigo, denies headaches.   MUSCULOSKELETAL: Denies any muscle or joint pain.   SKIN: Denies skin rashes or itching.   ENDOCRINE: Denies excessive thirst. Denies intolerance to heat or cold.  PSYCHOSOCIAL: Denies depression or anxiety. Denies any recent memory loss.       PHYSICAL EXAMINATION:  Appearance and vitals taken from home devices    Video Exam    Vitals:    04/05/24 1310   Weight: 40.8 kg (90 lb)   Height: 5' 6\" (1.676 m)         General Appearance:   Alert, cooperative, no distress   HEENT:  Normocephalic, atraumatic, anicteric. Neck supple, symmetrical, trachea midline.   Lungs:   Equal chest rise and unlabored breathing, normal effort, no coughing.   Cardiovascular:   No visualized JVD.   Abdomen:   No abdominal distension.   Skin:   No jaundice, rashes, or lesions.    Musculoskeletal:   Normal range of motion visualized.   Psych:  Normal affect and normal insight.   Neuro:  Alert and appropriate.        Visit Time  Total Visit Duration: 20 minutes        Answers submitted by the patient for this visit:  Abdominal Pain Questionnaire (Submitted on 4/5/2024)  Chief Complaint: Abdominal pain  Chronicity: recurrent  Onset: more than 1 year ago  Onset quality: undetermined  Frequency: intermittently  Episode duration: 3 Hours  Progression since onset: unchanged  Pain location: LLQ  Pain - numeric: 2/10  Pain quality: aching  Radiates to: does not radiate  anorexia: Yes  arthralgias: Yes  belching: Yes  constipation: Yes  diarrhea: No  dysuria: No  fever: No  flatus: No  frequency: No  headaches: Yes  hematochezia: No  hematuria: No  melena: No  myalgias: Yes  nausea: Yes  weight loss: Yes  vomiting: No  Aggravated by: movement  Relieved by: recumbency    "

## 2024-04-11 ENCOUNTER — CLINICAL SUPPORT (OUTPATIENT)
Dept: NUTRITION | Facility: HOSPITAL | Age: 49
End: 2024-04-11
Payer: COMMERCIAL

## 2024-04-11 DIAGNOSIS — R63.4 WEIGHT LOSS, UNINTENTIONAL: Primary | ICD-10-CM

## 2024-04-11 PROCEDURE — 97803 MED NUTRITION INDIV SUBSEQ: CPT

## 2024-04-11 NOTE — PROGRESS NOTES
" Nutrition Assessment Form    Patient Name: Cate Peña    YOB: 1975    Sex: Female     Assessment Date: 4/11/2024  Start Time:  11:00 am Stop Time: 11:30 am Total Minutes: 30     Data:  Present at session: self   Parent/Patient Concerns/reason for visit: \"I am able to eat more solid foods\"   Medical Dx/Reason for Referral: R79.89 (ICD-10-CM) - Low serum vitamin C & restrctive diet  Provider: Neftali Rose MD    Past Medical History:   Diagnosis Date    Costochondritis     Deviated septum 2023    left-with spur    Fibroid, uterine     Food poisoning 2023    Ovarian cyst     Seasonal allergies     Thyroid disease     Vitamin D deficiency        Current Outpatient Medications   Medication Sig Dispense Refill    albuterol (PROVENTIL HFA,VENTOLIN HFA) 90 mcg/act inhaler Inhale 2 puffs every 6 (six) hours as needed      ascorbic acid (VITAMIN C) 250 mg tablet Take 1,000 mg by mouth daily (Patient not taking: Reported on 4/5/2024)      EPINEPHrine (EPIPEN) 0.3 mg/0.3 mL SOAJ       ergocalciferol (VITAMIN D2) 50,000 units  (Patient not taking: Reported on 12/10/2021)      loratadine (CLARITIN) 10 mg tablet Take 10 mg by mouth daily      Synthroid 75 MCG tablet Take 1 tablet 6 days a week and no tab on Sunday. 26 tablet 0    Tirosint-SOL 75 MCG/ML SOLN 75 mcg liquid orally daily 30 mL 6     No current facility-administered medications for this visit.        Additional Meds/Supplements: N/A   Special Learning Needs/barriers to learning/any new barriers  N/A-pt thinks she may have autism   Height: 5'6 \" HC Readings from Last 5 Encounters:   No data found for HC      Weight: 91.5#, wt is between this and 93#   Wt Readings from Last 10 Encounters:   04/05/24 40.8 kg (90 lb)   02/29/24 42.2 kg (93 lb)   12/07/23 41.7 kg (92 lb)   12/07/23 41.7 kg (92 lb)   10/13/23 43.5 kg (96 lb)   11/22/22 44.5 kg (98 lb)   11/16/22 44.9 kg (99 lb)   10/27/21 46.7 kg (103 lb)   07/15/21 43.5 kg (96 lb)   06/07/21 45.4 kg (100 " "lb)     Estimated body mass index is 14.53 kg/m² as calculated from the following:    Height as of 4/5/24: 5' 6\" (1.676 m).    Weight as of 4/5/24: 40.8 kg (90 lb).   Recent Weight Change:  [x]Yes     []No  Amount: +1.5# x 1 wk      Energy Needs: 7354-2995 kcal (35-40 g/kg)  63-84 g PRO (1.2-1.5 g/kg)   Allergies   Allergen Reactions    Metronidazole Anxiety, Dizziness, Palpitations and Shortness Of Breath    Other Anaphylaxis, Hives, Shortness Of Breath, Itching, Photosensitivity, Nausea Only, Swelling, Anxiety, Palpitations, Rash, Other (See Comments), Tinnitus, GI Intolerance, Dizziness, Headache, Tachycardia, Confusion, Chest Pain, Fatigue, Irritability, Syncope, Drowsiness and Nasal Congestion     Fluorescent and halogen lights     *Solar Urticaria- Sun and    *Pressure Urticaria    *Pesticides    Strawberry (Diagnostic) - Food Allergy Dermatitis, Hives, Itching, Rash and Shortness Of Breath    Sulfites - Food Allergy Anaphylaxis, Hives and Shortness Of Breath    Adhesive  [Medical Tape] Hives    American Cockroach Other (See Comments)    Cat Hair Extract Other (See Comments)    Dog Epithelium (Canis Lupus Familiaris) Other (See Comments)    Peanut Oil - Food Allergy Other (See Comments)     \"Borderline allergy\" per pt    Penicillin G     Pollen Extract Other (See Comments)    Shellfish-Derived Products - Food Allergy Other (See Comments)    Shrimp Extract Allergy Skin Test - Food Allergy Other (See Comments)    or intolerances When younger-was allergic to strawberries; 20 years ago had hives once when had frozen fish-thinks from sulfites;    Social History     Substance and Sexual Activity   Alcohol Use No       Social History     Tobacco Use   Smoking Status Never   Smokeless Tobacco Never       Who shops? patient   Who cooks/cooking methods/Eating out/take out habits   patient     Exercise: Non at this time.     Other: ie: Sleep habits/ stress level/ work habits household-lives with ?/ food security    Prior " Nutritional Counseling? [x]Yes     []No  When: 03/26/24     Why: Follow up with this writer        Diet Hx: eats the same things energy day  Breakfast: Little renetta cookie/cream sandwiches (2 of them)  Or  (2)Johann chocolate chip muffin top-sometimes adds butter (~500 calories)   Lunch: American cheese (2 slices) & [employer] farm butter crackers (8-10)          Dinner: 1 c of milk + 1-2 pepperidge farm chessman cookies  Or  Chicken nuggets or french fries (1x/wk)  Or  Pancakes & butter with orange juice     Snacks: 1 pint of icecream per day after lunch (900-1200 kcal)   Other Notes/ Initial Assessment:    The patient was identified by name and date of birth. Cate Peña was informed that this is a telemedicine visit and that the visit is being conducted through [employer] VIA giddy. She agrees to proceed..  My office door was closed. No one else was in the room.  She acknowledged consent and understanding of privacy and security of the video platform. The patient has agreed to participate and understands they can discontinue the visit at any time. Patient is aware this is a billable service.       04/11/24    Pt with GI f/u 04/05/24 (virtual).  Provider had recommended an EDG but pt is not feeling up to it at this time d/t solar urticaria and light sensitivity. Pt confirms this and doesn't know what she can do about that at this time but is considering to do it in the fall when outside allergens are reduced. Pt does state that she was looking to see how things went with her dental procedure r/t tolerance, support system.     This writer asked pt if dr had mentioned EOE, she stated they have but can't assess for that until she has an EGD.    Pt has tried tasty cake cookie bars again after a few years of not having them-states her eats started to close up and her throat became tight. Pt thinks her esophagitis was triggered. Pt followed up with 2 providers about this, one things it is GERD related, the other more  allergy related. This has increased pt's anxiety with trying new foods.     Pt has reintroduced low-acid, no pulp orange juice into diet-pt with hx of low vitamin C levels. Pt is chewing better but still a little more hesitant to chew hard foods on that side-has been ok with crackers. Pt feels like she will feel more confident after the 1 mo post procedure suad.      Pt has tried pancakes with butter heated up in the microwave and tolerated them. Plans to introduce them more regularly. Pt states she is a little nervous about making other foods incase other foods will create certain smells that are noxious to her.    Pt also ate some fast food chicken nuggets and fries this past Sunday and ate a larger portion than she has had in the past.   It takes pt a long time to eat her pint of icecream and by the time that she is done it is like 2 pm and then isnt as hungry by the time dinner comes. Pt says she plans to continue to eat the whole pint at once and divulged some new information. States that she only uses disposable plates/utensils/cups  for fear of a reaction to the chemicals in cleaning products and what the  will emit when opening it up after running it.  Suggested pt still eat from the carton but 1/2 in the AM & 1/2 in the PM but she would still rather eat it all at once.     Pt also bought felipe ring ding cookies as something to add in as another snack.     Overall, since last appointment pt has increased anxiety around the likely kasper of having an allergic reaction to new foods even though she knows increasing new foods will help increase foods that can give her more calories.  Suggested for now, pt work on increasing the portions of her safe foods-discussed what that may look like r/t the foods she is eating. Pt thinks this is a good plan of action for now.     Still working on getting appropriate KENROY for our department to be able to have pt fill out to give to her therapist.     Follow up:  04/25/24 @ 11 am  ___________________________________________________________________________________________  03/26/24  Pt with a tooth removed yesterday. States she had a cup of milk last night and did ok with that. This morning pt had some yogurt and did ok with it as well.  Prior to procedure pt did purchase some more yogurt, milk, ice cream.      Overall pt has been doing ok since the procedure, didn't go under general anesthesia but took benadryl instead and is still feeling a little woozy.     Pt states she may ask her mom to  [employer] mac & cheese, she has gotten some frozen mac & cheese but hasn't tried the microwave yet.  Pt notes lack of support as being a hindrance to things like trying different take out, getting her toaster oven/microwave working/.     Pt reports she is feeling hungry--which is weird for her since she doesn't usually feel hungry.  Pt is excited for the tooth to heal so she can chew on that side again (hasn't been able to try without pain in almost a year).     Since last appointment pt wasn't able to try the Julia mac & cheese but has retried cheeses and some other cheeses she hasn't tried in a bit (trying different brands), also different types of chocolate cookies.     Pt states she has an appointment with her therapist this week for the first time in 2 weeks. States she still isnt sure how helpful it will be for this writer to talk to her therapist also given how busy she says her therapist is.     Pt has been trying to monitor fluids-milk, water, to help prevent dehydration.   Pt plans to try small smears of cream cheese, smushing up american cheese. Pt has cake she wants to try-suggested in a day or so maybe to try letting it soak in milk to get softer. Discussed different ways to try and get electrolytes.     Pt hasn't weighted herself yet, states she was nervous to weigh herself prior to the procedure incase she had lost some wt and it made her stressed out. Pt isnt sure at  "this time when she plans to weigh herself again-this writer suggested maybe not until she is back to her \"regular\" eating habits  for about a week unless she starts to feel ill or notices her clothes feeling looser.      Next follow up scheduled: 24      _____________________________________________________________________________________________________________________  24    Pt stated she was a little late to the appointment, had a work meeting and stated she was eating her pint of ice cream and it takes her a long time to eat. Pt did not set up her microwave, states that she had to order some new clothes and the chemicals in the packaging gave her an allergic reaction and created a smell in her house-pt was also concerned maybe there was a mouse that  in the wall. So pt stated that those things plus more contributed to the barriers as to why she didn't meet her goals.     States she had to order new clothes   Pt stated she was able to try the string cheese today but doesn't think she can try a new food today. States they are spraying outside and worried if she has a reaction she wouldn't be able to go outside to go to the hospital. Pt states even if it is a food she has had in the past-what if the company changed their ingredients or what if she develops a new allergy. Pt did have some Boars head and Sargento cheese-that she has had in the past-since she couldn't find her preferred cheeses. States she had to go out to a 3rd store and being out in the sun and just being outside more made her exhausted. Pt was also trying to check out different areas of the store and feels the different parts of the store had different lighting and that affected her.  She also felt was a bonus she picked up some more yogurt and hopes to try it in the next few days-even got raspberry instead of just vanilla.  Wheat thins*    Pt states she was ok with this writer talking with her therapist who she has been seeing over " the last 20 years but pt states her therapist is so busy and testifies around [place] so she may not be able to get back to this writer. Pt would prefer not to tell this writer the name of their therapist (out of ne to reach out to there therapist and pass on this writer's Therapist has helped pt with working with OCD r/t transporting foods from the store to help reduce the fear of food borne illness.       Discussed concerns about pt with adequate nutrition after having dental procedure with not as likely to be going out to get food and she will likely have limitations with food textures, she also may not be feeling well and appetite may decrease.  Pt feels it takes her longer to eat lunch and dinner.  Pt thinks that she may be more likely to increase her intake at breakfast-states she may be more likely to work more food into that meal.  When it came to talking about trying new foods and being specific about it when she can try a food, pt stated she understood SMART goals but that she just never knows.  Pt was willing to set a goal-see goal. Once pt is healed will discuss trying new foods, the main goal until then is adequate kcal-pt agreeable with this.     F/U the day after her procedure-as pt request-03/26/24  ____________________________________________________________________________________________________________________________________________________________________________________________________________________________  02/29/24    Within a 6mo to a year time span pt developed COVID (2020) then had to move out of apartment and developed other health issues and this is when the majority of the wt loss occurred. Pt works from home, 2 jobs. Pt also states she had her 1st wisdom tooth removal and was put under anesthesia and that also affected wt loss. Pt is supposed to have another tooth pulled at the end of March.     Pt notes a rare allergy to light as well as pesticides and weed killers (long standing  "allergy but it has gotten worse). States this hinders access to foods. Pt states she is a single parent of an adult disabled child-also has restrictive diet d/t autism. States her daughter also has smell issues. States she cannot have food delivered d/t pesticide allergy. Pt also developed esophagitis, intermittent, takes a long time for her to eat. States d/t health issues she cannot be tested for cancer. Pt notes seeing a therapist for years but they do not have experience with her health issues. Pt also notes bouts of nausea, also has issues with food smells and tastes--they all affect appetite and she will lose wt and not be able to gain it back. Pt denies wanting to lose wt, pt would like to eat foods again. Pt states many of her preferred snack foods have been phased out (notes cheese puffs).  Pt nervous to try new foods or try foods she has previously cut out. Pt notes her oven makes a bad smell since it is new so she doesn't use it. Pt has a new microwave at home but hasn't taken it out yet, states she hasn't had time to get it out and use it, states family has a Marcandi oven and they are trying to \"burn it out for her\". Pt has had some blood work testing done for allergy-saw shellfish  Pt reports getting food poisoning over Thanksgiving and now is paranoid/concerned about this happening again.  Pt notes fear of trying supplements d/t ingredients.  Pt notes she would rather try cereal as an additional source of kcal va oral supplements. Pt states an issue is being concerned about what the ingredients of the food are but pt couldn't state what ingredients are actually ones she would avoid other than MSG and some preservatives. Pt states he was willing to try eating yogurt-she gets it for her daughter but forgets to eat it, same with string cheese.    As per meeting and diet hx ps is observed to meet the criteria for ARFID. Pt with significant wt loss     Labs of note: 12/27/23 D <14, A1C 5.7, TSH 8.41, " elevated lipid panel, elevated TPO, very low vitamin C    Pt has also cut foods out d/t feeling like they make her feel like she has marbles in her mouth and then her throat will start to constrict.     Pt would like to be able to eat chips, french fries, a lot of frozen foods and italian foods.     Pt feels she would  As per conversation, lack of available way for pt to consume foods. Pt has microwave and just has to wash and put in the turntable. Most of the appointment today was gather information from pt, build rapport, discuss possible routes to go moving forward.  See goals.     Follow up scheduled for March 11 @ 1:30 pm-will work on pt consuming a new food during this appointment.        Nutrition Diagnosis:   Disordered eating pattern  related to multiple health issues, allergies as evidenced by wt loss, inadequate i       Any change or new dx since previous visit:     Nutrition Diagnosis:   Underweight  related to Inadequate energy intake as  evidenced by BMI       Medical Nutrition Therapy Intervention:  [x]Individualized Meal Plan  0629-1013 kcal (35-40 g/kg)  63-84 g PRO (1.2-1.5 g/kg) []Understanding Lab Values   []Basic Pathophysiology of Disease []Food/Medication Interactions   []Food Diary [x]Exercise: Not necessarily encouraged for now until wt stabilizes   [x]Lifestyle/Behavior Modification Techniques:  incorporating new foods, increasing intake at meals, incorporating snacks.  []Medication, Mechanism of Action   []Label Reading: CHO/ Na/ Fat/ other_________ []Self Blood Glucose Monitoring   [x]Weight/BMI Goals: gain/lose/maintain: Avoid wt loss []Other -           Comprehension: []Excellent  []Very Good  [x]Good  []Fair   []Poor    Receptivity: []Excellent  []Very Good  [x]Good  []Fair   []Poor    Expected Compliance: []Excellent  []Very Good  [x]Good  []Fair   []Poor          Goals: 04/11/24:  Pt will aim to eat a 1/4 more of her typical serving of foods at each meal   2.       3.       Goals  "(initial) 03/26/24:  Starting day 2/3 post procedure to consume 2 slices of cheese per day   2.    Pt will ask her mom to bring her [employer] mac & cheese    3.       Goals (initial) 03/11/24:  Within the next week try either Julia mac & cheese or another type of mac & cheese   2.       3.     Goals (initial) 02/29/24:  Weekend of march 9th/10 make pancakes in the microwave   2.    Set an alarm in her phone to eat a string cheese in the evening with her daughter.    3.     Labs:  CMP  Lab Results   Component Value Date    K 4.2 12/27/2023     12/27/2023    CO2 27 12/27/2023    BUN 7 12/27/2023    CREATININE 0.78 12/27/2023    CALCIUM 9.6 12/27/2023    AST 11 08/22/2023    ALT 6 08/22/2023    ALKPHOS 36 08/22/2023    EGFR 94 12/27/2023       BMP  Lab Results   Component Value Date    CALCIUM 9.6 12/27/2023    K 4.2 12/27/2023    CO2 27 12/27/2023     12/27/2023    BUN 7 12/27/2023    CREATININE 0.78 12/27/2023       Lipids  No results found for: \"CHOL\"  No results found for: \"HDL\"  No results found for: \"LDLCALC\"  No results found for: \"TRIG\"  No results found for: \"CHOLHDL\"    Hemoglobin A1C  Lab Results   Component Value Date    HGBA1C 5.7 (H) 12/27/2023       Fasting Glucose  No results found for: \"GLUF\"    Insulin     Thyroid  Lab Results   Component Value Date    TSH 8.41 (H) 12/27/2023       Hepatic Function Panel  Lab Results   Component Value Date    ALT 6 08/22/2023    AST 11 08/22/2023    ALKPHOS 36 08/22/2023       Celiac Disease Antibody Panel  No results found for: \"ENDOMYSIAL IGA\", \"GLIADIN IGA\", \"GLIADIN IGG\", \"IGA\", \"TISSUE TRANSGLUT AB\", \"TTG IGA\"   Iron  Lab Results   Component Value Date    IRON 71 05/01/2021    TIBC 394 05/01/2021    FERRITIN 18 05/01/2021            Tiffany Mijares RD, LDN  [facility]  [address] [place] [phone]    "

## 2024-04-30 ENCOUNTER — NURSE TRIAGE (OUTPATIENT)
Dept: OTHER | Facility: OTHER | Age: 49
End: 2024-04-30

## 2024-05-01 ENCOUNTER — NURSE TRIAGE (OUTPATIENT)
Age: 49
End: 2024-05-01

## 2024-05-01 NOTE — TELEPHONE ENCOUNTER
"Reason for Disposition   [1] Few streaks of blood in vomit AND [2] occurred one time    Additional Information   Coughing up blood  (i.e., from lungs)    Answer Assessment - Initial Assessment Questions  1. APPEARANCE of BLOOD: \"What does the blood look like?\" (e.g., color, coffee-grounds)      Pale pink/bright red tinge       2. AMOUNT: \"How much blood was lost?\"      Streak less than a teaspon    3. VOMITING BLOOD: \"How many times did it happen?\" or \"How many times in the past 24 hours?\"      Once     4. VOMITING WITHOUT BLOOD: \"How many times in the past 24 hours?\"       once    5. ONSET: \"When did vomiting of blood begin?\"      A couple hours ago after drinking some orange juice      6. CAUSE: \"What do you think is causing the vomiting of blood?\"      Not sure    7. BLOOD THINNERS: \"Do you take any blood thinners?\" (e.g., Coumadin/warfarin, Pradaxa/dabigatran, aspirin)      none    8. DEHYDRATION: \"Are there any signs of dehydration?\" \"When was the last time you urinated?\" \"Do you feel dizzy?\"      no    9. ABDOMINAL PAIN: \"Are you having any abdominal pain?\" If Yes, ask: \"What does it feel like? \" (e.g., crampy, dull, intermittent, constant)       no    10. DIARRHEA: \"Is there any diarrhea?\" If Yes, ask: \"How many times today?\"         no    11. OTHER SYMPTOMS: \"Do you have any other symptoms?\" (e.g., fever, blood in stool)        no    Protocols used: Vomiting Blood-ADULT-AH    Complex hx unintentional weight loss and GI symptoms pending EGD (delay due to pt concerns about solar urticaria and anaphylaxis during procedure) blood streaked vomitus/phlegm after drinking a cup of orange juice and feeling like it was stuck in her throat. Estimates less than a teaspoon of pinkish/bright red streaking. This happened a couple of hours ago with no recurrence. No abd pain. No cough or URI otherwise. She has chronic costochondritis and no change in that pain in terms of chest pain.    Paged on call as agove given pt " comorbidities who advised okay to monitor overnight and to contact office in am.    Upon calling back with advice, pt divulged that she made herself cough so hard due to concerns that a piece of plastic had flown into her mouth after opening a pakcage. Advised that if she was concerned that there was a foreign body and that she has a foreign body sensation, she should go to the ED. Pt declined at this time, does not believe she actually inhaled anything, attributed it to her OCD concerns. Advised if she had any further sensations of foreign body,, or if blood streaked vomit/phlegm continues, she not eat or drink anything and go to ED or call back.

## 2024-05-01 NOTE — TELEPHONE ENCOUNTER
Aleena is the nurse who spoke with her this morning. She will call patient right back to re go over what they had spoken about this morning and for clarification.

## 2024-05-01 NOTE — TELEPHONE ENCOUNTER
"Patient calling back in qustioning what \" be on the look out for food stuck in the base of your throat and what does that mean.     Spoke with patient regarding having the fl barium swallow done.  She states  Patient states that she needs her light allergy under control before she could do these procedures.  States that any light of pressure can cause anaphylaxis.    "

## 2024-05-01 NOTE — TELEPHONE ENCOUNTER
Spoke with pt, I informed her I was referring to monitor dysphagia symptoms if they do not resolve on their own. If pt is feeling food in her esophagus and it is causing obstruction, not able to eat or drink, vomiting or trouble breathing all ED precautions.Pt understood and not having those symptoms.     Pt discussed barium swallow and she has concerns due to her light allergy, I advised pt to discuss this at her next office visit with Dr. Owusu.

## 2024-05-01 NOTE — TELEPHONE ENCOUNTER
"Pt coughed hard last night and had spit up phlegm with red- pink tinged blood  x2 with a sore throat.   Today symptoms have resolved no blood tinges sputum, throat is just slightly irritated   She is able to swallow both solids and liquids. I advised her to monitor symptoms and to call back if symptoms persist.     Reason for Disposition   Coughing up blood (all other triage questions negative)    Answer Assessment - Initial Assessment Questions  1. ONSET: \"When did the cough begin?\"       Yesterday   2. SEVERITY: \"How bad is the cough today?\" \"Did the blood appear after a coughing spell?\"       None   3. SPUTUM: \"Describe the color of your sputum\" (none, dry cough; clear, white, yellow, green)        4. HEMOPTYSIS: \"How much blood?\" (flecks, streaks, tablespoons, etc.)      Small amount   5. DIFFICULTY BREATHING: \"Are you having difficulty breathing?\" If Yes, ask: \"How bad is it?\" (e.g., mild, moderate, severe)     - MILD: No SOB at rest, mild SOB with walking, speaks normally in sentences, can lay down, no retractions, pulse < 100.     - MODERATE: SOB at rest, SOB with minimal exertion and prefers to sit, cannot lie down flat, speaks in phrases, mild retractions, audible wheezing, pulse 100-120.     - SEVERE: Very SOB at rest, speaks in single words, struggling to breathe, sitting hunched forward, retractions, pulse > 120         6. FEVER: \"Do you have a fever?\" If Yes, ask: \"What is your temperature, how was it measured, and when did it start?\"    Protocols used: Coughing Up Blood-ADULT-    "

## 2024-05-01 NOTE — TELEPHONE ENCOUNTER
"Regarding: blood in mucus  ----- Message from Meli Mendenhall sent at 4/30/2024  9:46 PM EDT -----  Patient called, \" when I was trying to spit up excess of phlegm, I saw blood in my mucus. It was bright red.\"    "

## 2024-05-07 ENCOUNTER — CLINICAL SUPPORT (OUTPATIENT)
Dept: NUTRITION | Facility: HOSPITAL | Age: 49
End: 2024-05-07
Payer: COMMERCIAL

## 2024-05-07 DIAGNOSIS — R63.4 WEIGHT LOSS, UNINTENTIONAL: Primary | ICD-10-CM

## 2024-05-07 PROCEDURE — 97803 MED NUTRITION INDIV SUBSEQ: CPT

## 2024-05-07 NOTE — PROGRESS NOTES
" Nutrition Assessment Form    Patient Name: Cate Peña    YOB: 1975    Sex: Female     Assessment Date: 5/7/2024  Start Time:  10:30 am Stop Time: 11:00 am Total Minutes: 30     Data:  Present at session: self   Parent/Patient Concerns/reason for visit: \" I haven't made much progress\"   Medical Dx/Reason for Referral: R79.89 (ICD-10-CM) - Low serum vitamin C & restrctive diet  Provider: Neftali Rose MD    Past Medical History:   Diagnosis Date    Costochondritis     Deviated septum 2023    left-with spur    Fibroid, uterine     Food poisoning 2023    Ovarian cyst     Seasonal allergies     Thyroid disease     Vitamin D deficiency        Current Outpatient Medications   Medication Sig Dispense Refill    albuterol (PROVENTIL HFA,VENTOLIN HFA) 90 mcg/act inhaler Inhale 2 puffs every 6 (six) hours as needed      ascorbic acid (VITAMIN C) 250 mg tablet Take 1,000 mg by mouth daily (Patient not taking: Reported on 4/5/2024)      EPINEPHrine (EPIPEN) 0.3 mg/0.3 mL SOAJ       ergocalciferol (VITAMIN D2) 50,000 units  (Patient not taking: Reported on 12/10/2021)      loratadine (CLARITIN) 10 mg tablet Take 10 mg by mouth daily      Synthroid 75 MCG tablet Take 1 tablet 6 days a week and no tab on Sunday. 26 tablet 0    Tirosint-SOL 75 MCG/ML SOLN 75 mcg liquid orally daily 30 mL 6     No current facility-administered medications for this visit.        Additional Meds/Supplements: N/A   Special Learning Needs/barriers to learning/any new barriers  N/A-pt thinks she may have autism   Height: 5'6 \" HC Readings from Last 5 Encounters:   No data found for HC      Weight: 96# 3.2oz a per yesterday's dr appointment.  But pt was wearing heavy boots. She feels is 92-94#    Wt Readings from Last 10 Encounters:   04/05/24 40.8 kg (90 lb)   02/29/24 42.2 kg (93 lb)   12/07/23 41.7 kg (92 lb)   12/07/23 41.7 kg (92 lb)   10/13/23 43.5 kg (96 lb)   11/22/22 44.5 kg (98 lb)   11/16/22 44.9 kg (99 lb)   10/27/21 46.7 kg " "(103 lb)   07/15/21 43.5 kg (96 lb)   06/07/21 45.4 kg (100 lb)     Estimated body mass index is 14.53 kg/m² as calculated from the following:    Height as of 4/5/24: 5' 6\" (1.676 m).    Weight as of 4/5/24: 40.8 kg (90 lb).   Recent Weight Change:  [x]Yes     []No  Amount: +2-3# x 1 mo      Energy Needs: 5766-8261 kcal (35-40 g/kg)  63-84 g PRO (1.2-1.5 g/kg)  8825-9382 mL (1 mL/kcal)   Allergies   Allergen Reactions    Metronidazole Anxiety, Dizziness, Palpitations and Shortness Of Breath    Other Anaphylaxis, Hives, Shortness Of Breath, Itching, Photosensitivity, Nausea Only, Swelling, Anxiety, Palpitations, Rash, Other (See Comments), Tinnitus, GI Intolerance, Dizziness, Headache, Tachycardia, Confusion, Chest Pain, Fatigue, Irritability, Syncope, Drowsiness and Nasal Congestion     Fluorescent and halogen lights     *Solar Urticaria- Sun and    *Pressure Urticaria    *Pesticides    Strawberry (Diagnostic) - Food Allergy Dermatitis, Hives, Itching, Rash and Shortness Of Breath    Sulfites - Food Allergy Anaphylaxis, Hives and Shortness Of Breath    Adhesive  [Medical Tape] Hives    American Cockroach Other (See Comments)    Cat Hair Extract Other (See Comments)    Dog Epithelium (Canis Lupus Familiaris) Other (See Comments)    Peanut Oil - Food Allergy Other (See Comments)     \"Borderline allergy\" per pt    Penicillin G     Pollen Extract Other (See Comments)    Shellfish-Derived Products - Food Allergy Other (See Comments)    Shrimp Extract Allergy Skin Test - Food Allergy Other (See Comments)    or intolerances When younger-was allergic to strawberries; 20 years ago had hives once when had frozen fish-thinks from sulfites;    Social History     Substance and Sexual Activity   Alcohol Use No       Social History     Tobacco Use   Smoking Status Never   Smokeless Tobacco Never       Who shops? patient   Who cooks/cooking methods/Eating out/take out habits   patient     Exercise: Non at this time.     Other: ie: " "Sleep habits/ stress level/ work habits household-lives with ?/ food security    Prior Nutritional Counseling? [x]Yes     []No  When: 04/11/24     Why: Follow up with this writer        Diet Hx: eats the same things energy day  Breakfast: Little renetta cookie/cream sandwiches (2 of them)  Or  (2)Ojhann chocolate chip muffin top-sometimes adds butter (~500 calories)   Lunch: American cheese (2 slices) & [employer] farm butter crackers (8-10)          Dinner: 1 c of milk + 1-2 pepperidge farm chessman cookies  Or  Chicken nuggets or french fries (1x/wk)  Or  Pancakes & butter with orange juice     Snacks: 1 pint of icecream per day after lunch (900-1200 kcal)   Other Notes/ Initial Assessment:    The patient was identified by name and date of birth. Cate Peña was informed that this is a telemedicine visit and that the visit is being conducted through [facility] VIA [employer]. She agrees to proceed..  My office door was closed. No one else was in the room.  She acknowledged consent and understanding of privacy and security of the video platform. The patient has agreed to participate and understands they can discontinue the visit at any time. Patient is aware this is a billable service.       05/07/24    Pt provided a signed copy of     Pt feels like she is able to chew again as per normal since the procedure. Feels she is so used to eating on the non-procedure side.  Pt is working on trying to remember to eat on the other side.     Pt doesn't feel she has made much progress. States she has had some health setbacks which have affected her appetite/PO intake.  Pt attempted orange juice to get more vitamin C. States she had an OCD episode ardound the same time and also had more phleghm with the OJ.     Pt did try out one new food-wgmans soft chocolate chip cookies, felt she had an allergic reaction-used to tolerate them but this was a set back.  Pt also had an issue with running out of her \"safe\" hand soap so had a reaction " "to a new soap she has tried.  States she now has a burning throat irritation, feels it is causing her to have styes in her eye, also thinks she may have a UTI from it. Pt is fearful that trying a new soap will put her into anaphylaxis shock.     Pt feels these issues have been making her eat less d/t allergy.  Pt also notes her dear of choking while eating if she is trying to talk to someone.    Long term goal is to go with daughter to [employer] and eat there. Pt wants to be able to give pt more \"regular\" experiences.  Suggested that pt try to get take out from there to try different items to see if     Follow up:  06/04/24    ___________________________________________________________________________________________________  04/11/24      Pt with GI f/u 04/05/24 (virtual).  Provider had recommended an EDG but pt is not feeling up to it at this time d/t solar urticaria and light sensitivity. Pt confirms this and doesn't know what she can do about that at this time but is considering to do it in the fall when outside allergens are reduced. Pt does state that she was looking to see how things went with her dental procedure r/t tolerance, support system.     This writer asked pt if  had mentioned EOE, she stated they have but can't assess for that until she has an EGD.    Pt has tried tasty cake cookie bars again after a few years of not having them-states her eats started to close up and her throat became tight. Pt thinks her esophagitis was triggered. Pt followed up with 2 providers about this, one things it is GERD related, the other more allergy related. This has increased pt's anxiety with trying new foods.     Pt has reintroduced low-acid, no pulp orange juice into diet-pt with hx of low vitamin C levels. Pt is chewing better but still a little more hesitant to chew hard foods on that side-has been ok with crackers. Pt feels like she will feel more confident after the 1 mo post procedure suad.      Pt has " tried pancakes with butter heated up in the microwave and tolerated them. Plans to introduce them more regularly. Pt states she is a little nervous about making other foods incase other foods will create certain smells that are noxious to her.    Pt also ate some fast food chicken nuggets and fries this past Sunday and ate a larger portion than she has had in the past.   It takes pt a long time to eat her pint of icecream and by the time that she is done it is like 2 pm and then isnt as hungry by the time dinner comes. Pt says she plans to continue to eat the whole pint at once and divulged some new information. States that she only uses disposable plates/utensils/cups  for fear of a reaction to the chemicals in cleaning products and what the  will emit when opening it up after running it.  Suggested pt still eat from the carton but 1/2 in the AM & 1/2 in the PM but she would still rather eat it all at once.     Pt also bought felipe ring ding cookies as something to add in as another snack.     Overall, since last appointment pt has increased anxiety around the likely kasper of having an allergic reaction to new foods even though she knows increasing new foods will help increase foods that can give her more calories.  Suggested for now, pt work on increasing the portions of her safe foods-discussed what that may look like r/t the foods she is eating. Pt thinks this is a good plan of action for now.     Still working on getting appropriate KENROY for our department to be able to have pt fill out to give to her therapist.     Follow up: 04/25/24 @ 11 am  ___________________________________________________________________________________________  03/26/24  Pt with a tooth removed yesterday. States she had a cup of milk last night and did ok with that. This morning pt had some yogurt and did ok with it as well.  Prior to procedure pt did purchase some more yogurt, milk, ice cream.      Overall pt has been doing ok  "since the procedure, didn't go under general anesthesia but took benadryl instead and is still feeling a little woozy.     Pt states she may ask her mom to  [employer] mac & cheese, she has gotten some frozen mac & cheese but hasn't tried the microwave yet.  Pt notes lack of support as being a hindrance to things like trying different take out, getting her toaster oven/microwave working/.     Pt reports she is feeling hungry--which is weird for her since she doesn't usually feel hungry.  Pt is excited for the tooth to heal so she can chew on that side again (hasn't been able to try without pain in almost a year).     Since last appointment pt wasn't able to try the Julia mac & cheese but has retried cheeses and some other cheeses she hasn't tried in a bit (trying different brands), also different types of chocolate cookies.     Pt states she has an appointment with her therapist this week for the first time in 2 weeks. States she still isnt sure how helpful it will be for this writer to talk to her therapist also given how busy she says her therapist is.     Pt has been trying to monitor fluids-milk, water, to help prevent dehydration.   Pt plans to try small smears of cream cheese, smushing up american cheese. Pt has cake she wants to try-suggested in a day or so maybe to try letting it soak in milk to get softer. Discussed different ways to try and get electrolytes.     Pt hasn't weighted herself yet, states she was nervous to weigh herself prior to the procedure incase she had lost some wt and it made her stressed out. Pt isnt sure at this time when she plans to weigh herself again-this writer suggested maybe not until she is back to her \"regular\" eating habits  for about a week unless she starts to feel ill or notices her clothes feeling looser.      Next follow up scheduled: " 24      _____________________________________________________________________________________________________________________  24    Pt stated she was a little late to the appointment, had a work meeting and stated she was eating her pint of ice cream and it takes her a long time to eat. Pt did not set up her microwave, states that she had to order some new clothes and the chemicals in the packaging gave her an allergic reaction and created a smell in her house-pt was also concerned maybe there was a mouse that  in the wall. So pt stated that those things plus more contributed to the barriers as to why she didn't meet her goals.     States she had to order new clothes   Pt stated she was able to try the string cheese today but doesn't think she can try a new food today. States they are spraying outside and worried if she has a reaction she wouldn't be able to go outside to go to the hospital. Pt states even if it is a food she has had in the past-what if the company changed their ingredients or what if she develops a new allergy. Pt did have some Boars head and Sargento cheese-that she has had in the past-since she couldn't find her preferred cheeses. States she had to go out to a 3rd store and being out in the sun and just being outside more made her exhausted. Pt was also trying to check out different areas of the store and feels the different parts of the store had different lighting and that affected her.  She also felt was a bonus she picked up some more yogurt and hopes to try it in the next few days-even got raspberry instead of just vanilla.  Wheat thins*    Pt states she was ok with this writer talking with her therapist who she has been seeing over the last 20 years but pt states her therapist is so busy and test[place] so she may not be able to get back to this writer. Pt would prefer not to tell this writer the name of their therapist (out of [place] to reach out to there  therapist and pass on this writer's Therapist has helped pt with working with OCD r/t transporting foods from the store to help reduce the fear of food borne illness.       Discussed concerns about pt with adequate nutrition after having dental procedure with not as likely to be going out to get food and she will likely have limitations with food textures, she also may not be feeling well and appetite may decrease.  Pt feels it takes her longer to eat lunch and dinner.  Pt thinks that she may be more likely to increase her intake at breakfast-states she may be more likely to work more food into that meal.  When it came to talking about trying new foods and being specific about it when she can try a food, pt stated she understood SMART goals but that she just never knows.  Pt was willing to set a goal-see goal. Once pt is healed will discuss trying new foods, the main goal until then is adequate kcal-pt agreeable with this.     F/U the day after her procedure-as pt request-03/26/24  ____________________________________________________________________________________________________________________________________________________________________________________________________________________________  02/29/24    Within a 6mo to a year time span pt developed COVID (2020) then had to move out of apartment and developed other health issues and this is when the majority of the wt loss occurred. Pt works from home, 2 jobs. Pt also states she had her 1st wisdom tooth removal and was put under anesthesia and that also affected wt loss. Pt is supposed to have another tooth pulled at the end of March.     Pt notes a rare allergy to light as well as pesticides and weed killers (long standing allergy but it has gotten worse). States this hinders access to foods. Pt states she is a single parent of an adult disabled child-also has restrictive diet d/t autism. States her daughter also has smell issues. States she cannot have food  "delivered d/t pesticide allergy. Pt also developed esophagitis, intermittent, takes a long time for her to eat. States d/t health issues she cannot be tested for cancer. Pt notes seeing a therapist for years but they do not have experience with her health issues. Pt also notes bouts of nausea, also has issues with food smells and tastes--they all affect appetite and she will lose wt and not be able to gain it back. Pt denies wanting to lose wt, pt would like to eat foods again. Pt states many of her preferred snack foods have been phased out (notes cheese puffs).  Pt nervous to try new foods or try foods she has previously cut out. Pt notes her oven makes a bad smell since it is new so she doesn't use it. Pt has a new microwave at home but hasn't taken it out yet, states she hasn't had time to get it out and use it, states family has a Earth Skyaster oven and they are trying to \"burn it out for her\". Pt has had some blood work testing done for allergy-saw shellfish  Pt reports getting food poisoning over Thanksgiving and now is paranoid/concerned about this happening again.  Pt notes fear of trying supplements d/t ingredients.  Pt notes she would rather try cereal as an additional source of kcal va oral supplements. Pt states an issue is being concerned about what the ingredients of the food are but pt couldn't state what ingredients are actually ones she would avoid other than MSG and some preservatives. Pt states he was willing to try eating yogurt-she gets it for her daughter but forgets to eat it, same with string cheese.    As per meeting and diet hx ps is observed to meet the criteria for ARFID. Pt with significant wt loss     Labs of note: 12/27/23 D <14, A1C 5.7, TSH 8.41, elevated lipid panel, elevated TPO, very low vitamin C    Pt has also cut foods out d/t feeling like they make her feel like she has marbles in her mouth and then her throat will start to constrict.     Pt would like to be able to eat chips, " french fries, a lot of frozen foods and italian foods.     Pt feels she would  As per conversation, lack of available way for pt to consume foods. Pt has microwave and just has to wash and put in the turntable. Most of the appointment today was gather information from pt, build rapport, discuss possible routes to go moving forward.  See goals.     Follow up scheduled for March 11 @ 1:30 pm-will work on pt consuming a new food during this appointment.        Nutrition Diagnosis:   Disordered eating pattern  related to multiple health issues, allergies as evidenced by wt loss, inadequate i       Any change or new dx since previous visit:     Nutrition Diagnosis:   Underweight  related to Inadequate energy intake as  evidenced by BMI <18.5      Medical Nutrition Therapy Intervention:  [x]Individualized Meal Plan  4976-6234 kcal (35-40 g/kg)  63-84 g PRO (1.2-1.5 g/kg)  3711-4374 mL (1 mL/kcal) []Understanding Lab Values   []Basic Pathophysiology of Disease []Food/Medication Interactions   []Food Diary [x]Exercise: Not necessarily encouraged for now until wt stabilizes   [x]Lifestyle/Behavior Modification Techniques:  incorporating new foods, increasing intake at meals, incorporating snacks.  []Medication, Mechanism of Action   []Label Reading: CHO/ Na/ Fat/ other_________ []Self Blood Glucose Monitoring   [x]Weight/BMI Goals: gain/lose/maintain: Avoid wt loss []Other -           Comprehension: []Excellent  []Very Good  [x]Good  []Fair   []Poor    Receptivity: []Excellent  []Very Good  [x]Good  []Fair   []Poor    Expected Compliance: []Excellent  []Very Good  [x]Good  []Fair   []Poor          Goals: 05/07/24:  Pt didn't feel she could set goals at this time.    2.       3.       Goals: 04/11/24:  Pt will aim to eat a 1/4 more of her typical serving of foods at each meal   2.       3.       Goals (initial) 03/26/24:  Starting day 2/3 post procedure to consume 2 slices of cheese per day   2.    Pt will ask her mom to  "bring her [employer] mac & cheese    3.       Goals (initial) 03/11/24:  Within the next week try either Julia mac & cheese or another type of mac & cheese   2.       3.     Goals (initial) 02/29/24:  Weekend of march 9th/10 make pancakes in the microwave   2.    Set an alarm in her phone to eat a string cheese in the evening with her daughter.    3.     Labs:  CMP  Lab Results   Component Value Date    K 4.2 12/27/2023     12/27/2023    CO2 27 12/27/2023    BUN 7 12/27/2023    CREATININE 0.78 12/27/2023    CALCIUM 9.6 12/27/2023    AST 11 08/22/2023    ALT 6 08/22/2023    ALKPHOS 36 08/22/2023    EGFR 94 12/27/2023       BMP  Lab Results   Component Value Date    CALCIUM 9.6 12/27/2023    K 4.2 12/27/2023    CO2 27 12/27/2023     12/27/2023    BUN 7 12/27/2023    CREATININE 0.78 12/27/2023       Lipids  No results found for: \"CHOL\"  No results found for: \"HDL\"  No results found for: \"LDLCALC\"  No results found for: \"TRIG\"  No results found for: \"CHOLHDL\"    Hemoglobin A1C  Lab Results   Component Value Date    HGBA1C 5.7 (H) 12/27/2023       Fasting Glucose  No results found for: \"GLUF\"    Insulin     Thyroid  Lab Results   Component Value Date    TSH 8.41 (H) 12/27/2023       Hepatic Function Panel  Lab Results   Component Value Date    ALT 6 08/22/2023    AST 11 08/22/2023    ALKPHOS 36 08/22/2023       Celiac Disease Antibody Panel  No results found for: \"ENDOMYSIAL IGA\", \"GLIADIN IGA\", \"GLIADIN IGG\", \"IGA\", \"TISSUE TRANSGLUT AB\", \"TTG IGA\"   Iron  Lab Results   Component Value Date    IRON 71 05/01/2021    TIBC 394 05/01/2021    FERRITIN 18 05/01/2021            Tiffany Mijares RD, LDN  [facility] CLINICAL NUTRITION [facility]  [address] [place] [phone]    "

## 2024-06-04 ENCOUNTER — CLINICAL SUPPORT (OUTPATIENT)
Dept: NUTRITION | Facility: HOSPITAL | Age: 49
End: 2024-06-04
Payer: COMMERCIAL

## 2024-06-04 VITALS — BODY MASS INDEX: 15.27 KG/M2 | WEIGHT: 95 LBS | HEIGHT: 66 IN

## 2024-06-04 DIAGNOSIS — R63.4 WEIGHT LOSS, UNINTENTIONAL: Primary | ICD-10-CM

## 2024-06-04 PROCEDURE — 97803 MED NUTRITION INDIV SUBSEQ: CPT

## 2024-06-04 NOTE — PROGRESS NOTES
" Nutrition Assessment Form    Patient Name: Cate Peña    YOB: 1975    Sex: Female     Assessment Date: 6/4/2024  Start Time:  11:30 am Stop Time: 12:00 pm Total Minutes: 30     Data:  Present at session: self   Parent/Patient Concerns/reason for visit: \" I haven't made much progress\"   Medical Dx/Reason for Referral: R79.89 (ICD-10-CM) - Low serum vitamin C & restrctive diet  Provider: Neftali Rose MD    Past Medical History:   Diagnosis Date    Costochondritis     Deviated septum 2023    left-with spur    Fibroid, uterine     Food poisoning 2023    Ovarian cyst     Seasonal allergies     Thyroid disease     Vitamin D deficiency        Current Outpatient Medications   Medication Sig Dispense Refill    albuterol (PROVENTIL HFA,VENTOLIN HFA) 90 mcg/act inhaler Inhale 2 puffs every 6 (six) hours as needed      ascorbic acid (VITAMIN C) 250 mg tablet Take 1,000 mg by mouth daily (Patient not taking: Reported on 4/5/2024)      EPINEPHrine (EPIPEN) 0.3 mg/0.3 mL SOAJ       ergocalciferol (VITAMIN D2) 50,000 units  (Patient not taking: Reported on 12/10/2021)      loratadine (CLARITIN) 10 mg tablet Take 10 mg by mouth daily      Synthroid 75 MCG tablet Take 1 tablet 6 days a week and no tab on Sunday. 26 tablet 0    Tirosint-SOL 75 MCG/ML SOLN 75 mcg liquid orally daily 30 mL 6     No current facility-administered medications for this visit.        Additional Meds/Supplements: N/A   Special Learning Needs/barriers to learning/any new barriers  N/A-pt thinks she may have autism   Height: 5'6 \" HC Readings from Last 5 Encounters:   No data found for HC      Weight: Pt states she has been between 94# & 96#   Wt Readings from Last 10 Encounters:   04/05/24 40.8 kg (90 lb)   02/29/24 42.2 kg (93 lb)   12/07/23 41.7 kg (92 lb)   12/07/23 41.7 kg (92 lb)   10/13/23 43.5 kg (96 lb)   11/22/22 44.5 kg (98 lb)   11/16/22 44.9 kg (99 lb)   10/27/21 46.7 kg (103 lb)   07/15/21 43.5 kg (96 lb)   06/07/21 45.4 kg " "(100 lb)     Estimated body mass index is 14.53 kg/m² as calculated from the following:    Height as of 4/5/24: 5' 6\" (1.676 m).    Weight as of 4/5/24: 40.8 kg (90 lb).   Recent Weight Change:  [x]Yes     []No  Amount: +2-3# x 1 mo      Energy Needs: 9400-1503 kcal (35-40 g/kg)  63-84 g PRO (1.2-1.5 g/kg)  6981-5617 mL (1 mL/kcal)   Allergies   Allergen Reactions    Metronidazole Anxiety, Dizziness, Palpitations and Shortness Of Breath    Other Anaphylaxis, Hives, Shortness Of Breath, Itching, Photosensitivity, Nausea Only, Swelling, Anxiety, Palpitations, Rash, Other (See Comments), Tinnitus, GI Intolerance, Dizziness, Headache, Tachycardia, Confusion, Chest Pain, Fatigue, Irritability, Syncope, Drowsiness and Nasal Congestion     Fluorescent and halogen lights     *Solar Urticaria- Sun and    *Pressure Urticaria    *Pesticides    Strawberry (Diagnostic) - Food Allergy Dermatitis, Hives, Itching, Rash and Shortness Of Breath    Sulfites - Food Allergy Anaphylaxis, Hives and Shortness Of Breath    Adhesive  [Medical Tape] Hives    American Cockroach Other (See Comments)    Cat Hair Extract Other (See Comments)    Dog Epithelium (Canis Lupus Familiaris) Other (See Comments)    Peanut Oil - Food Allergy Other (See Comments)     \"Borderline allergy\" per pt    Penicillin G     Pollen Extract Other (See Comments)    Shellfish-Derived Products - Food Allergy Other (See Comments)    Shrimp Extract Allergy Skin Test - Food Allergy Other (See Comments)    or intolerances When younger-was allergic to strawberries; 20 years ago had hives once when had frozen fish-thinks from sulfites;    Social History     Substance and Sexual Activity   Alcohol Use No       Social History     Tobacco Use   Smoking Status Never   Smokeless Tobacco Never       Who shops? patient   Who cooks/cooking methods/Eating out/take out habits   patient     Exercise: Non at this time.     Other: ie: Sleep habits/ stress level/ work habits household-lives " "with ?/ food security    Prior Nutritional Counseling? [x]Yes     []No  When: 04/11/24     Why: Follow up with this writer        Diet Hx: as of 06/04/24  Breakfast: (2)Johann chocolate chip muffin top-sometimes adds butter (~500 calories)   Lunch: American cheese (3 slices) & [employer] farm butter crackers (8-12)          Dinner: 1 c of milk + 1-2 pepperidge farm chessman cookies  Or  Chicken nuggets or french fries (1x/wk)  Or  1c orange juice + 1 muffin top       Snacks: 1 pint of icecream per day after lunch-Aris & Jerrys (900-1200 kcal)   Other Notes/ Initial Assessment:    The patient was identified by name and date of birth. Cate Peña was informed that this is a telemedicine visit and that the visit is being conducted through [employer]. She agrees to proceed..  My office door was closed. No one else was in the room.  She acknowledged consent and understanding of privacy and security of the video platform. The patient has agreed to participate and understands they can discontinue the visit at any time. Patient is aware this is a billable service.         06/04/24     Pt notes that her nausea and esophagitis has been at \"baseline\" only gets worse with Dove chocolates-which she has previously tolerated. Pt notes she is feeling a \"pins & needles\" pain all over her body.  Pt states while she knows that DM has been ruled out numerous times, she still feels like sometimes her BG spikes d/t how she feels. Pt knows eating some protein with a sugary food can help with slowing the spike in BG so trying to consume milk with cookies. Pt hasn't been using the microwave d/t often being more tired or nervous about the smell that the mac & cheese would emit and since she cannot open the windows d/t outside allergens.  Pt also concerned about being allergic to the light emitted from the microwave and concern about rust being in the microwave and that causing a reaction from her.  Pt note has the toaster--didn't as " "of the last appointment. States her family had tried it out and they tell her it doesn't have a smell.     Pt notes the stores she goes to have the safe foods that they have often been out of.    The last 2 weeks has been able ot eat 3 muffin tops per day. Prior to this week pt has been eating cheese -now up to 3 slices + more crackers-suggested pt trying to butter the crackers before eating but pt stated she has not liked this in the past.  Pt wasn't able to go out to the store Sunday d/t neighbor doing yard work earlier than usual and people were \"spraying\" outside. Pt has been getting chicken nuggets, fries, mc flurries most weekends. Still drinking orange. Suggested trying to get     Pt notes she has been weighing 94-96# over the last few weeks.  Pt wonders if this is r/t issues with her thyroid-last time checked was 12/2023. Pt knows she should be going to get blood work but not sure when she will be go out d/t the weather, outside allergens, people mowing. Suggested looking into if people can come to her house.    New foods: Crispy Driven Pixels cookies, a new Ar[employer] Jan[employer] ice cream flavor (chocolate therapy), has 2 other flavors but after pt opened them up the containers were only 3/4 of the way filled and this turned pt off.    On pt's list to try: more buttered pancakes, different ice cream flavors, increasing milk intake (currently @ 1c/d)    16oz bottles of water (3x/d), worried if has an additional cup of water (2c/d) + 1c of orange juice.    Pt has been applying to new jobs-2nd job.  Pt concerned about this d/t not being able to be outside, that it takes her about 2 hours to eat lunch. Pt states she does need this 2nd job to be able to afford her perferred foods     At this time pt feels like it is more likely that she is able to increase her preferred foods vs trying new foods as a means to get more calories.       Follow up scheduled  " "07/02/24  _______________________________________________________________________________________    05/07/24    Pt provided a signed copy of     Pt feels like she is able to chew again as per normal since the procedure. Feels she is so used to eating on the non-procedure side.  Pt is working on trying to remember to eat on the other side.     Pt doesn't feel she has made much progress. States she has had some health setbacks which have affected her appetite/PO intake.  Pt attempted orange juice to get more vitamin C. States she had an OCD episode ardound the same time and also had more phleghm with the OJ.     Pt did try out one new food-wgmans soft chocolate chip cookies, felt she had an allergic reaction-used to tolerate them but this was a set back.  Pt also had an issue with running out of her \"safe\" hand soap so had a reaction to a new soap she has tried.  States she now has a burning throat irritation, feels it is causing her to have styes in her eye, also thinks she may have a UTI from it. Pt is fearful that trying a new soap will put her into anaphylaxis shock.     Pt feels these issues have been making her eat less d/t allergy.  Pt also notes her dear of choking while eating if she is trying to talk to someone.    Long term goal is to go with daughter to [employer] and eat there. Pt wants to be able to give pt more \"regular\" experiences.  Suggested that pt try to get take out from there to try different items to see if     Follow up:  06/04/24    ___________________________________________________________________________________________________  04/11/24      Pt with GI f/u 04/05/24 (virtual).  Provider had recommended an EDG but pt is not feeling up to it at this time d/t solar urticaria and light sensitivity. Pt confirms this and doesn't know what she can do about that at this time but is considering to do it in the fall when outside allergens are reduced. Pt does state that she was looking to see how " things went with her dental procedure r/t tolerance, support system.     This writer asked pt if dr had mentioned EOE, she stated they have but can't assess for that until she has an EGD.    Pt has tried tasty cake cookie bars again after a few years of not having them-states her eats started to close up and her throat became tight. Pt thinks her esophagitis was triggered. Pt followed up with 2 providers about this, one things it is GERD related, the other more allergy related. This has increased pt's anxiety with trying new foods.     Pt has reintroduced low-acid, no pulp orange juice into diet-pt with hx of low vitamin C levels. Pt is chewing better but still a little more hesitant to chew hard foods on that side-has been ok with crackers. Pt feels like she will feel more confident after the 1 mo post procedure suad.      Pt has tried pancakes with butter heated up in the microwave and tolerated them. Plans to introduce them more regularly. Pt states she is a little nervous about making other foods incase other foods will create certain smells that are noxious to her.    Pt also ate some fast food chicken nuggets and fries this past Sunday and ate a larger portion than she has had in the past.   It takes pt a long time to eat her pint of icecream and by the time that she is done it is like 2 pm and then isnt as hungry by the time dinner comes. Pt says she plans to continue to eat the whole pint at once and divulged some new information. States that she only uses disposable plates/utensils/cups  for fear of a reaction to the chemicals in cleaning products and what the  will emit when opening it up after running it.  Suggested pt still eat from the carton but 1/2 in the AM & 1/2 in the PM but she would still rather eat it all at once.     Pt also bought felipe ring ding cookies as something to add in as another snack.     Overall, since last appointment pt has increased anxiety around the likely kasper of  having an allergic reaction to new foods even though she knows increasing new foods will help increase foods that can give her more calories.  Suggested for now, pt work on increasing the portions of her safe foods-discussed what that may look like r/t the foods she is eating. Pt thinks this is a good plan of action for now.     Still working on getting appropriate KENROY for our department to be able to have pt fill out to give to her therapist.     Follow up: 04/25/24 @ 11 am  ___________________________________________________________________________________________  03/26/24  Pt with a tooth removed yesterday. States she had a cup of milk last night and did ok with that. This morning pt had some yogurt and did ok with it as well.  Prior to procedure pt did purchase some more yogurt, milk, ice cream.      Overall pt has been doing ok since the procedure, didn't go under general anesthesia but took benadryl instead and is still feeling a little woozy.     Pt states she may ask her mom to  [employer] mac & cheese, she has gotten some frozen mac & cheese but hasn't tried the microwave yet.  Pt notes lack of support as being a hindrance to things like trying different take out, getting her toaster oven/microwave working/.     Pt reports she is feeling hungry--which is weird for her since she doesn't usually feel hungry.  Pt is excited for the tooth to heal so she can chew on that side again (hasn't been able to try without pain in almost a year).     Since last appointment pt wasn't able to try the Julia mac & cheese but has retried cheeses and some other cheeses she hasn't tried in a bit (trying different brands), also different types of chocolate cookies.     Pt states she has an appointment with her therapist this week for the first time in 2 weeks. States she still isnt sure how helpful it will be for this writer to talk to her therapist also given how busy she says her therapist is.     Pt has been trying to  "monitor fluids-milk, water, to help prevent dehydration.   Pt plans to try small smears of cream cheese, smushing up american cheese. Pt has cake she wants to try-suggested in a day or so maybe to try letting it soak in milk to get softer. Discussed different ways to try and get electrolytes.     Pt hasn't weighted herself yet, states she was nervous to weigh herself prior to the procedure incase she had lost some wt and it made her stressed out. Pt isnt sure at this time when she plans to weigh herself again-this writer suggested maybe not until she is back to her \"regular\" eating habits  for about a week unless she starts to feel ill or notices her clothes feeling looser.      Next follow up scheduled: 24      _____________________________________________________________________________________________________________________  24    Pt stated she was a little late to the appointment, had a work meeting and stated she was eating her pint of ice cream and it takes her a long time to eat. Pt did not set up her microwave, states that she had to order some new clothes and the chemicals in the packaging gave her an allergic reaction and created a smell in her house-pt was also concerned maybe there was a mouse that  in the wall. So pt stated that those things plus more contributed to the barriers as to why she didn't meet her goals.     States she had to order new clothes   Pt stated she was able to try the string cheese today but doesn't think she can try a new food today. States they are spraying outside and worried if she has a reaction she wouldn't be able to go outside to go to the hospital. Pt states even if it is a food she has had in the past-what if the company changed their ingredients or what if she develops a new allergy. Pt did have some Boars head and Sargento cheese-that she has had in the past-since she couldn't find her preferred cheeses. States she had to go out to a 3rd store and being " out in the sun and just being outside more made her exhausted. Pt was also trying to check out different areas of the store and feels the different parts of the store had different lighting and that affected her.  She also felt was a bonus she picked up some more yogurt and hopes to try it in the next few days-even got raspberry instead of just vanilla.  Wheat thins*    Pt states she was ok with this writer talking with her therapist who she has been seeing over the last 20 years but pt states her therapist is so busy and testifies [place] so she may not be able to get back to this writer. Pt would prefer not to tell this writer the name of their therapist (out of ne to reach out to there therapist and pass on this writer's Therapist has helped pt with working with OCD r/t transporting foods from the store to help reduce the fear of food borne illness.       Discussed concerns about pt with adequate nutrition after having dental procedure with not as likely to be going out to get food and she will likely have limitations with food textures, she also may not be feeling well and appetite may decrease.  Pt feels it takes her longer to eat lunch and dinner.  Pt thinks that she may be more likely to increase her intake at breakfast-states she may be more likely to work more food into that meal.  When it came to talking about trying new foods and being specific about it when she can try a food, pt stated she understood SMART goals but that she just never knows.  Pt was willing to set a goal-see goal. Once pt is healed will discuss trying new foods, the main goal until then is adequate kcal-pt agreeable with this.     F/U the day after her procedure-as pt request-03/26/24  ____________________________________________________________________________________________________________________________________________________________________________________________________________________________  02/29/24    [facility]  "6mo to a year time span pt developed COVID (2020) then had to move out of apartment and developed other health issues and this is when the majority of the wt loss occurred. Pt works from home, 2 jobs. Pt also states she had her 1st wisdom tooth removal and was put under anesthesia and that also affected wt loss. Pt is supposed to have another tooth pulled at the end of March.     Pt notes a rare allergy to light as well as pesticides and weed killers (long standing allergy but it has gotten worse). States this hinders access to foods. Pt states she is a single parent of an adult disabled child-also has restrictive diet d/t autism. States her daughter also has smell issues. States she cannot have food delivered d/t pesticide allergy. Pt also developed esophagitis, intermittent, takes a long time for her to eat. States d/t health issues she cannot be tested for cancer. Pt notes seeing a therapist for years but they do not have experience with her health issues. Pt also notes bouts of nausea, also has issues with food smells and tastes--they all affect appetite and she will lose wt and not be able to gain it back. Pt denies wanting to lose wt, pt would like to eat foods again. Pt states many of her preferred snack foods have been phased out (notes cheese puffs).  Pt nervous to try new foods or try foods she has previously cut out. Pt notes her oven makes a bad smell since it is new so she doesn't use it. Pt has a new microwave at home but hasn't taken it out yet, states she hasn't had time to get it out and use it, states family has a toaster oven and they are trying to \"burn it out for her\". Pt has had some blood work testing done for allergy-saw shellfish  Pt reports getting food poisoning over Thanksgiving and now is paranoid/concerned about this happening again.  Pt notes fear of trying supplements d/t ingredients.  Pt notes she would rather try cereal as an additional source of kcal va oral supplements. Pt states " an issue is being concerned about what the ingredients of the food are but pt couldn't state what ingredients are actually ones she would avoid other than MSG and some preservatives. Pt states he was willing to try eating yogurt-she gets it for her daughter but forgets to eat it, same with string cheese.    As per meeting and diet hx ps is observed to meet the criteria for ARFID. Pt with significant wt loss     Labs of note: 12/27/23 D <14, A1C 5.7, TSH 8.41, elevated lipid panel, elevated TPO, very low vitamin C    Pt has also cut foods out d/t feeling like they make her feel like she has marbles in her mouth and then her throat will start to constrict.     Pt would like to be able to eat chips, french fries, a lot of frozen foods and italian foods.     Pt feels she would  As per conversation, lack of available way for pt to consume foods. Pt has microwave and just has to wash and put in the turntable. Most of the appointment today was gather information from pt, build rapport, discuss possible routes to go moving forward.  See goals.     Follow up scheduled for March 11 @ 1:30 pm-will work on pt consuming a new food during this appointment.        Nutrition Diagnosis:   Disordered eating pattern  related to multiple health issues, allergies as evidenced by wt loss, inadequate i       Any change or new dx since previous visit:     Nutrition Diagnosis:   Underweight  related to Inadequate energy intake as  evidenced by BMI <18.5      Medical Nutrition Therapy Intervention:  [x]Individualized Meal Plan  2269-8760 kcal (35-40 g/kg)  63-84 g PRO (1.2-1.5 g/kg)  7756-1639 mL (1 mL/kcal) []Understanding Lab Values   []Basic Pathophysiology of Disease []Food/Medication Interactions   []Food Diary [x]Exercise: Not necessarily encouraged for now until wt stabilizes   [x]Lifestyle/Behavior Modification Techniques:  incorporating new foods, increasing intake at meals, incorporating snacks.  []Medication, Mechanism of Action  "  []Label Reading: CHO/ Na/ Fat/ other_________ []Self Blood Glucose Monitoring   [x]Weight/BMI Goals: gain/lose/maintain: Avoid wt loss []Other -           Comprehension: []Excellent  []Very Good  [x]Good  []Fair   []Poor    Receptivity: []Excellent  []Very Good  [x]Good  []Fair   []Poor    Expected Compliance: []Excellent  []Very Good  [x]Good  []Fair   []Poor          Goals: 06/04/24:  Pt is going to increase her milk by 1/2c to 1c/day.   2.       3.       Goals: 05/07/24:  Pt didn't feel she could set goals at this time.    2.       3.       Goals: 04/11/24:  Pt will aim to eat a 1/4 more of her typical serving of foods at each meal   2.       3.       Goals (initial) 03/26/24:  Starting day 2/3 post procedure to consume 2 slices of cheese per day   2.    Pt will ask her mom to bring her [employer] mac & cheese    3.       Goals (initial) 03/11/24:  Within the next week try either Julia mac & cheese or another type of mac & cheese   2.       3.     Goals (initial) 02/29/24:  Weekend of march 9th/10 make pancakes in the microwave   2.    Set an alarm in her phone to eat a string cheese in the evening with her daughter.    3.     Labs:  CMP  Lab Results   Component Value Date    K 4.2 12/27/2023     12/27/2023    CO2 27 12/27/2023    BUN 7 12/27/2023    CREATININE 0.78 12/27/2023    CALCIUM 9.6 12/27/2023    AST 11 08/22/2023    ALT 6 08/22/2023    ALKPHOS 36 08/22/2023    EGFR 94 12/27/2023       BMP  Lab Results   Component Value Date    CALCIUM 9.6 12/27/2023    K 4.2 12/27/2023    CO2 27 12/27/2023     12/27/2023    BUN 7 12/27/2023    CREATININE 0.78 12/27/2023       Lipids  No results found for: \"CHOL\"  No results found for: \"HDL\"  No results found for: \"LDLCALC\"  No results found for: \"TRIG\"  No results found for: \"CHOLHDL\"    Hemoglobin A1C  Lab Results   Component Value Date    HGBA1C 5.7 (H) 12/27/2023       Fasting Glucose  No results found for: \"GLUF\"    Insulin     Thyroid  Lab Results " "  Component Value Date    TSH 8.41 (H) 12/27/2023       Hepatic Function Panel  Lab Results   Component Value Date    ALT 6 08/22/2023    AST 11 08/22/2023    ALKPHOS 36 08/22/2023       Celiac Disease Antibody Panel  No results found for: \"ENDOMYSIAL IGA\", \"GLIADIN IGA\", \"GLIADIN IGG\", \"IGA\", \"TISSUE TRANSGLUT AB\", \"TTG IGA\"   Iron  Lab Results   Component Value Date    IRON 71 05/01/2021    TIBC 394 05/01/2021    FERRITIN 18 05/01/2021            Tiffany Mijares RD, LDN  [facility]  [address] [place] [id number]    "

## 2024-06-10 ENCOUNTER — NURSE TRIAGE (OUTPATIENT)
Age: 49
End: 2024-06-10

## 2024-06-10 NOTE — TELEPHONE ENCOUNTER
"SPOKE WITH PT, HX GERD, WEIGHT LOSS, ESOPHAGEAL DYSPHAGIA. TELEVISIT 4/5/24. PT C/O RECENT STRESS, WHICH EXACERBATES HER DYSPHAGIA. PT HAD MODERATE TO SEVERE DIFFICULTY SWALLOWING HER CLARITIN THIS AM, FOLLOWED BY THROAT SORENESS AND HOARSENESS. PT ALSO REPORTS EXCESS SALIVA/DROOLING AT TIMES, AS WELL AS B/L EAR PRESSURE ENT THINKS MAY BE RELATED TO GERD. PT DENIES FEVER, CHILLS, N/V, SOB, CHOKING. PT ADVISED SHE CAN TRIAL CLARITIN SUSPENSION, CHEW FOOD WELL, SMALL BITES, ALTERNATING WITH LIQUIDS, SITTING UPRIGHT TO EAT. ANY OTHER RECOMMENDATIONS? PT DECLINES EGD DUE TO SUN ALLERGY.            Answer Assessment - Initial Assessment Questions  1. SYMPTOM: \"Are you having difficulty swallowing liquids, solids, or both?\"      DIFFICULTY SWALLOWING CLARITIN  2. ONSET: \"When did the swallowing problems begin?\"       THIS AM  3. CAUSE: \"What do you think is causing the problem?\"       RECENT STRESS/ GERD  4. CHRONIC/RECURRENT: \"Is this a new problem for you?\"  If no, ask: \"How long have you had this problem?\" (e.g., days, weeks, months)       RECURRENT  5. OTHER SYMPTOMS: \"Do you have any other symptoms?\" (e.g., difficulty breathing, sore throat, swollen tongue, chest pain)      THROAT SORENESS, EXCESS SALIVA, DROOLING    Protocols used: Swallowing Difficulty-ADULT-[place]    "

## 2024-07-02 ENCOUNTER — CLINICAL SUPPORT (OUTPATIENT)
Dept: NUTRITION | Facility: HOSPITAL | Age: 49
End: 2024-07-02
Payer: COMMERCIAL

## 2024-07-02 ENCOUNTER — TELEPHONE (OUTPATIENT)
Age: 49
End: 2024-07-02

## 2024-07-02 DIAGNOSIS — R63.4 WEIGHT LOSS, UNINTENTIONAL: Primary | ICD-10-CM

## 2024-07-02 PROCEDURE — 97803 MED NUTRITION INDIV SUBSEQ: CPT

## 2024-07-02 NOTE — TELEPHONE ENCOUNTER
"TeleMed 04/05/24, F/U TeleMed 07/16/24    Pt transferred to myself by Leela.    Pt with history of dysphagia, GERD reports increased difficulty with swallowing both solids and liquids that began 06/29. Pt reports taking small bites and chewing thoroughly. In the past, similar symptoms typically self resolved within a week however pt reports current symptoms are \"more pronounced now then ever experienced before\". Pt states \"on occasion it feels like something is structurally wrong\" within her esophagus. Pt describes a sensation of \"pooling liquid\" within the clavicle region. Denies the feeling of impacted food; denies difficulty breathing. O2 saturation 95% - 99%.     Pt has been reluctant to have EGD performed due to solar urticaria and light sensitivity however she states she is now ready to move forward with this procedure. Pt states Benadryl 25 mg IV push has prevented adverse reactions to procedural lighting in the past. Pt inquiring if it would be possible to be admitted for EGD procedure for closer monitoring. Pt would like procedure to be performed by .    Pt advised to consume liquid diet with minimal soft foods. Avoid crackers, bread, red meat, etc. Pt agreeable.           "

## 2024-07-02 NOTE — TELEPHONE ENCOUNTER
Pt calling stating she needs advise on symptoms and wants to speak w nurse. Pt having trouble swallowing, I warm transfer to [facility] gi triage nurse.

## 2024-07-02 NOTE — PROGRESS NOTES
" Nutrition Assessment Form    Patient Name: Cate Peña    YOB: 1975    Sex: Female     Assessment Date: 7/2/2024  Start Time:  2:00 Pm Stop Time: 2:30 pm Total Minutes: 30     Data:  Present at session: self   Parent/Patient Concerns/reason for visit: \"I'm not doing good\".   Medical Dx/Reason for Referral: R79.89 (ICD-10-CM) - Low serum vitamin C & restrctive diet  Provider: Neftali Rose MD    Past Medical History:   Diagnosis Date    Costochondritis     Deviated septum 2023    left-with spur    Fibroid, uterine     Food poisoning 2023    Ovarian cyst     Seasonal allergies     Thyroid disease     Vitamin D deficiency        Current Outpatient Medications   Medication Sig Dispense Refill    albuterol (PROVENTIL HFA,VENTOLIN HFA) 90 mcg/act inhaler Inhale 2 puffs every 6 (six) hours as needed      ascorbic acid (VITAMIN C) 250 mg tablet Take 1,000 mg by mouth daily (Patient not taking: Reported on 4/5/2024)      EPINEPHrine (EPIPEN) 0.3 mg/0.3 mL SOAJ       ergocalciferol (VITAMIN D2) 50,000 units  (Patient not taking: Reported on 12/10/2021)      loratadine (CLARITIN) 10 mg tablet Take 10 mg by mouth daily      Synthroid 75 MCG tablet Take 1 tablet 6 days a week and no tab on Sunday. 26 tablet 0    Tirosint-SOL 75 MCG/ML SOLN 75 mcg liquid orally daily 30 mL 6     No current facility-administered medications for this visit.        Additional Meds/Supplements: N/A   Special Learning Needs/barriers to learning/any new barriers  N/A-pt thinks she may have autism   Height: 5'6 \" HC Readings from Last 5 Encounters:   No data found for HC      Weight: Today pt states she has been between 92-93#    06/04/24 Pt states she has been between 94# & 96#   Wt Readings from Last 10 Encounters:   06/04/24 43.1 kg (95 lb)   04/05/24 40.8 kg (90 lb)   02/29/24 42.2 kg (93 lb)   12/07/23 41.7 kg (92 lb)   12/07/23 41.7 kg (92 lb)   10/13/23 43.5 kg (96 lb)   11/22/22 44.5 kg (98 lb)   11/16/22 44.9 kg (99 lb) " "  10/27/21 46.7 kg (103 lb)   07/15/21 43.5 kg (96 lb)     Estimated body mass index is 15.33 kg/m² as calculated from the following:    Height as of 6/4/24: 5' 6\" (1.676 m).    Weight as of 6/4/24: 43.1 kg (95 lb).   Recent Weight Change:  [x]Yes     []No  Amount: +2-3# x 1 mo      Energy Needs: 6543-5120 kcal (35-40 g/kg)  63-84 g PRO (1.2-1.5 g/kg)  0505-3456 mL (1 mL/kcal)   Allergies   Allergen Reactions    Metronidazole Anxiety, Dizziness, Palpitations and Shortness Of Breath    Other Anaphylaxis, Hives, Shortness Of Breath, Itching, Photosensitivity, Nausea Only, Swelling, Anxiety, Palpitations, Rash, Other (See Comments), Tinnitus, GI Intolerance, Dizziness, Headache, Tachycardia, Confusion, Chest Pain, Fatigue, Irritability, Syncope, Drowsiness and Nasal Congestion     Fluorescent and halogen lights     *Solar Urticaria- Sun and    *Pressure Urticaria    *Pesticides    Strawberry (Diagnostic) - Food Allergy Dermatitis, Hives, Itching, Rash and Shortness Of Breath    Sulfites - Food Allergy Anaphylaxis, Hives and Shortness Of Breath    Adhesive  [Medical Tape] Hives    American Cockroach Other (See Comments)    Cat Hair Extract Other (See Comments)    Dog Epithelium (Canis Lupus Familiaris) Other (See Comments)    Peanut Oil - Food Allergy Other (See Comments)     \"Borderline allergy\" per pt    Penicillin G     Pollen Extract Other (See Comments)    Shellfish-Derived Products - Food Allergy Other (See Comments)    Shrimp Extract Allergy Skin Test - Food Allergy Other (See Comments)    or intolerances When younger-was allergic to strawberries; 20 years ago had hives once when had frozen fish-thinks from sulfites;    Social History     Substance and Sexual Activity   Alcohol Use No       Social History     Tobacco Use   Smoking Status Never   Smokeless Tobacco Never       Who shops? patient   Who cooks/cooking methods/Eating out/take out habits   patient     Exercise: Non at this time.     Other: ie: Sleep " habits/ stress level/ work habits household-lives with ?/ food security    Prior Nutritional Counseling? [x]Yes     []No  When: 06/04/24     Why: Follow up with this writer        Diet Hx: as per pt s recall of 07/01/24,   Breakfast: yogurt   Lunch: Milk (1)         Dinner: Icecream pint       Snacks:    Other Notes/ Initial Assessment:    The patient was identified by name and date of birth. Cate Peña was informed that this is a telemedicine visit and that the visit is being conducted through [employer]. She agrees to proceed..  My office door was closed. No one else was in the room.  She acknowledged consent and understanding of privacy and security of the video platform. The patient has agreed to participate and understands they can discontinue the visit at any time. Patient is aware this is a billable service.     07/02/24  Pt states her wt has gone from 94-95# to 92-93#.   PO intake has decreased over the last week. Pt notes that she has been having issues with swallowing, pt thought it was a bad batch of the brownie ice cream.  Pt contacted [employer] to see how they handle the product and she was told that they put the box of ice cream on the floor before putting it into the freezer, pt states she is not ok with that practice. Pt them went to look into getting the ice creams from [employer] instead but used a new reusable bag.  Pt stated the inside of the bag smelled and it made her ice cream smell so she didn't eat any of it.  Pt then had to try a new cayla & jerrys ice cream and did ok with it even though it had caramel in it until a few hours later when throat started to tighten up.     Pt contacted GI who are really pushing for pt to have an EGD.  Pt was suggested to take Claritin but even had an issue with swallowing the pill.  contacted GI who are really pushing for pt to have an EGD.  At this time d/t pt's strong concern about not being able to swallow, she is now agreeable to getting an EDG done  "but very concerned about sun allergy and environmental allergy.  Not only is pt agreeable to getting the EGD but would also like to stay overnight and have any and all labs/procedures done. Pt would like to be seen by a speech therapist r/t dysphagia. Pt has been in contact with her GI and is waiting to hear back from them about the proper course to take to get her in with consideration for her sun allergy.  States the dr is currently in surgery and she is waiting for them to call her back when he is done, she hopes to get within the next few days.    Pt has been drinking (3) 16oz bottles of water but pt is also concerned because it is taking her so much longer to drink even thin liquids.  This writer has had a conversation with pt's therapist since the last OP MNT appointment. Before being made aware that pt may be going to have procedure done, this writer was going to discuss with pt the real concern about her malnutrition and that being it has been over 4 months since the initial appointment, a conversation would need to be had about how to make additional moves give the real concern with malnutrition.  Will address this at next visit if appropriate.     Follow up scheduled 07/16/24  __________________________________________________________________________________________________________________________    06/04/24     Pt notes that her nausea and esophagitis has been at \"baseline\" only gets worse with Dove chocolates-which she has previously tolerated. Pt notes she is feeling a \"pins & needles\" pain all over her body.  Pt states while she knows that DM has been ruled out numerous times, she still feels like sometimes her BG spikes d/t how she feels. Pt knows eating some protein with a sugary food can help with slowing the spike in BG so trying to consume milk with cookies. Pt hasn't been using the microwave d/t often being more tired or nervous about the smell that the mac & cheese would emit and since she " "cannot open the windows d/t outside allergens.  Pt also concerned about being allergic to the light emitted from the microwave and concern about rust being in the microwave and that causing a reaction from her.  Pt note has the toaster--didn't as of the last appointment. States her family had tried it out and they tell her it doesn't have a smell.     Pt notes the stores she goes to have the safe foods that they have often been out of.    The last 2 weeks has been able ot eat 3 muffin tops per day. Prior to this week pt has been eating cheese -now up to 3 slices + more crackers-suggested pt trying to butter the crackers before eating but pt stated she has not liked this in the past.  Pt wasn't able to go out to the store Sunday d/t neighbor doing yard work earlier than usual and people were \"spraying\" outside. Pt has been getting chicken nuggets, fries, mc flurries most weekends. Still drinking orange. Suggested trying to get     Pt notes she has been weighing 94-96# over the last few weeks.  Pt wonders if this is r/t issues with her thyroid-last time checked was 12/2023. Pt knows she should be going to get blood work but not sure when she will be go out d/t the weather, outside allergens, people mowing. Suggested looking into if people can come to her house.    New foods: [employer] cookies, a new [employer] ice cream flavor (chocolate therapy), has 2 other flavors but after pt opened them up the containers were only 3/4 of the way filled and this turned pt off.    On pt's list to try: more buttered pancakes, different ice cream flavors, increasing milk intake (currently @ 1c/d)    16oz bottles of water (3x/d), worried if has an additional cup of water (2c/d) + 1c of orange juice.    Pt has been applying to new jobs-2nd job.  Pt concerned about this d/t not being able to be outside, that it takes her about 2 hours to eat lunch. Pt states she does need this 2nd job to be able to afford her perferred foods " "    At this time pt feels like it is more likely that she is able to increase her preferred foods vs trying new foods as a means to get more calories.       Follow up scheduled  07/02/24  _______________________________________________________________________________________    05/07/24    Pt provided a signed copy of     Pt feels like she is able to chew again as per normal since the procedure. Feels she is so used to eating on the non-procedure side.  Pt is working on trying to remember to eat on the other side.     Pt doesn't feel she has made much progress. States she has had some health setbacks which have affected her appetite/PO intake.  Pt attempted orange juice to get more vitamin C. States she had an OCD episode ardound the same time and also had more phleghm with the OJ.     Pt did try out one new food-[employer] soft chocolate chip cookies, felt she had an allergic reaction-used to tolerate them but this was a set back.  Pt also had an issue with running out of her \"safe\" hand soap so had a reaction to a new soap she has tried.  States she now has a burning throat irritation, feels it is causing her to have styes in her eye, also thinks she may have a UTI from it. Pt is fearful that trying a new soap will put her into anaphylaxis shock.     Pt feels these issues have been making her eat less d/t allergy.  Pt also notes her dear of choking while eating if she is trying to talk to someone.    Long term goal is to go with daughter to [employer] and eat there. Pt wants to be able to give pt more \"regular\" experiences.  Suggested that pt try to get take out from there to try different items to see if     Follow up:  06/04/24    ___________________________________________________________________________________________________  04/11/24      Pt with GI f/u 04/05/24 (virtual).  Provider had recommended an EDG but pt is not feeling up to it at this time d/t solar urticaria and light sensitivity. Pt confirms this " and doesn't know what she can do about that at this time but is considering to do it in the fall when outside allergens are reduced. Pt does state that she was looking to see how things went with her dental procedure r/t tolerance, support system.     This writer asked pt if  had mentioned EOE, she stated they have but can't assess for that until she has an EGD.    Pt has tried tasty cake cookie bars again after a few years of not having them-states her eats started to close up and her throat became tight. Pt thinks her esophagitis was triggered. Pt followed up with 2 providers about this, one things it is GERD related, the other more allergy related. This has increased pt's anxiety with trying new foods.     Pt has reintroduced low-acid, no pulp orange juice into diet-pt with hx of low vitamin C levels. Pt is chewing better but still a little more hesitant to chew hard foods on that side-has been ok with crackers. Pt feels like she will feel more confident after the 1 mo post procedure suad.      Pt has tried pancakes with butter heated up in the microwave and tolerated them. Plans to introduce them more regularly. Pt states she is a little nervous about making other foods incase other foods will create certain smells that are noxious to her.    Pt also ate some fast food chicken nuggets and fries this past Sunday and ate a larger portion than she has had in the past.   It takes pt a long time to eat her pint of icecream and by the time that she is done it is like 2 pm and then isnt as hungry by the time dinner comes. Pt says she plans to continue to eat the whole pint at once and divulged some new information. States that she only uses disposable plates/utensils/cups  for fear of a reaction to the chemicals in cleaning products and what the  will emit when opening it up after running it.  Suggested pt still eat from the carton but 1/2 in the AM & 1/2 in the PM but she would still rather eat it all at  once.     Pt also bought felipe ring ding cookies as something to add in as another snack.     Overall, since last appointment pt has increased anxiety around the likely kasper of having an allergic reaction to new foods even though she knows increasing new foods will help increase foods that can give her more calories.  Suggested for now, pt work on increasing the portions of her safe foods-discussed what that may look like r/t the foods she is eating. Pt thinks this is a good plan of action for now.     Still working on getting appropriate KENROY for our department to be able to have pt fill out to give to her therapist.     Follow up: 04/25/24 @ 11 am  ___________________________________________________________________________________________  03/26/24  Pt with a tooth removed yesterday. States she had a cup of milk last night and did ok with that. This morning pt had some yogurt and did ok with it as well.  Prior to procedure pt did purchase some more yogurt, milk, ice cream.      Overall pt has been doing ok since the procedure, didn't go under general anesthesia but took benadryl instead and is still feeling a little woozy.     Pt states she may ask her mom to  [employer] mac & cheese, she has gotten some frozen mac & cheese but hasn't tried the microwave yet.  Pt notes lack of support as being a hindrance to things like trying different take out, getting her toaster oven/microwave working/.     Pt reports she is feeling hungry--which is weird for her since she doesn't usually feel hungry.  Pt is excited for the tooth to heal so she can chew on that side again (hasn't been able to try without pain in almost a year).     Since last appointment pt wasn't able to try the Julia mac & cheese but has retried cheeses and some other cheeses she hasn't tried in a bit (trying different brands), also different types of chocolate cookies.     Pt states she has an appointment with her therapist this week for the first time in  "2 weeks. States she still isnt sure how helpful it will be for this writer to talk to her therapist also given how busy she says her therapist is.     Pt has been trying to monitor fluids-milk, water, to help prevent dehydration.   Pt plans to try small smears of cream cheese, smushing up american cheese. Pt has cake she wants to try-suggested in a day or so maybe to try letting it soak in milk to get softer. Discussed different ways to try and get electrolytes.     Pt hasn't weighted herself yet, states she was nervous to weigh herself prior to the procedure incase she had lost some wt and it made her stressed out. Pt isnt sure at this time when she plans to weigh herself again-this writer suggested maybe not until she is back to her \"regular\" eating habits  for about a week unless she starts to feel ill or notices her clothes feeling looser.      Next follow up scheduled: 24      _____________________________________________________________________________________________________________________  24    Pt stated she was a little late to the appointment, had a work meeting and stated she was eating her pint of ice cream and it takes her a long time to eat. Pt did not set up her microwave, states that she had to order some new clothes and the chemicals in the packaging gave her an allergic reaction and created a smell in her house-pt was also concerned maybe there was a mouse that  in the wall. So pt stated that those things plus more contributed to the barriers as to why she didn't meet her goals.     States she had to order new clothes   Pt stated she was able to try the string cheese today but doesn't think she can try a new food today. States they are spraying outside and worried if she has a reaction she wouldn't be able to go outside to go to the hospital. Pt states even if it is a food she has had in the past-what if the company changed their ingredients or what if she develops a new allergy. " Pt did have some Boars head and Sargento cheese-that she has had in the past-since she couldn't find her preferred cheeses. States she had to go out to a 3rd store and being out in the sun and just being outside more made her exhausted. Pt was also trying to check out different areas of the store and feels the different parts of the store had different lighting and that affected her.  She also felt was a bonus she picked up some more yogurt and hopes to try it in the next few days-even got raspberry instead of just vanilla.  Wheat thins*    Pt states she was ok with this writer talking with her therapist who she has been seeing over the last 20 years but pt states her therapist is so busy and testifies [place] so she may not be able to get back to this writer. Pt would prefer not to tell this writer the name of their therapist (out of ne to reach out to there therapist and pass on this writer's Therapist has helped pt with working with OCD r/t transporting foods from the store to help reduce the fear of food borne illness.       Discussed concerns about pt with adequate nutrition after having dental procedure with not as likely to be going out to get food and she will likely have limitations with food textures, she also may not be feeling well and appetite may decrease.  Pt feels it takes her longer to eat lunch and dinner.  Pt thinks that she may be more likely to increase her intake at breakfast-states she may be more likely to work more food into that meal.  When it came to talking about trying new foods and being specific about it when she can try a food, pt stated she understood SMART goals but that she just never knows.  Pt was willing to set a goal-see goal. Once pt is healed will discuss trying new foods, the main goal until then is adequate kcal-pt agreeable with this.     F/U the day after her procedure-as pt  "request-03/26/24  ____________________________________________________________________________________________________________________________________________________________________________________________________________________________  02/29/24    Within a 6mo to a year time span pt developed COVID (2020) then had to move out of apartment and developed other health issues and this is when the majority of the wt loss occurred. Pt works from home, 2 jobs. Pt also states she had her 1st wisdom tooth removal and was put under anesthesia and that also affected wt loss. Pt is supposed to have another tooth pulled at the end of March.     Pt notes a rare allergy to light as well as pesticides and weed killers (long standing allergy but it has gotten worse). States this hinders access to foods. Pt states she is a single parent of an adult disabled child-also has restrictive diet d/t autism. States her daughter also has smell issues. States she cannot have food delivered d/t pesticide allergy. Pt also developed esophagitis, intermittent, takes a long time for her to eat. States d/t health issues she cannot be tested for cancer. Pt notes seeing a therapist for years but they do not have experience with her health issues. Pt also notes bouts of nausea, also has issues with food smells and tastes--they all affect appetite and she will lose wt and not be able to gain it back. Pt denies wanting to lose wt, pt would like to eat foods again. Pt states many of her preferred snack foods have been phased out (notes cheese puffs).  Pt nervous to try new foods or try foods she has previously cut out. Pt notes her oven makes a bad smell since it is new so she doesn't use it. Pt has a new microwave at home but hasn't taken it out yet, states she hasn't had time to get it out and use it, states family has a Cram[employer]aster oven and they are trying to \"burn it out for her\". Pt has had some blood work testing done for allergy-saw shellfish  Pt " reports getting food poisoning over Thanksgiving and now is paranoid/concerned about this happening again.  Pt notes fear of trying supplements d/t ingredients.  Pt notes she would rather try cereal as an additional source of kcal va oral supplements. Pt states an issue is being concerned about what the ingredients of the food are but pt couldn't state what ingredients are actually ones she would avoid other than MSG and some preservatives. Pt states he was willing to try eating yogurt-she gets it for her daughter but forgets to eat it, same with string cheese.    As per meeting and diet hx ps is observed to meet the criteria for ARFID. Pt with significant wt loss     Labs of note: 12/27/23 D <14, A1C 5.7, TSH 8.41, elevated lipid panel, elevated TPO, very low vitamin C    Pt has also cut foods out d/t feeling like they make her feel like she has marbles in her mouth and then her throat will start to constrict.     Pt would like to be able to eat chips, french fries, a lot of frozen foods and italian foods.     Pt feels she would  As per conversation, lack of available way for pt to consume foods. Pt has microwave and just has to wash and put in the turntable. Most of the appointment today was gather information from pt, build rapport, discuss possible routes to go moving forward.  See goals.     Follow up scheduled for March 11 @ 1:30 pm-will work on pt consuming a new food during this appointment.        Nutrition Diagnosis:   Disordered eating pattern  related to multiple health issues, allergies as evidenced by wt loss, inadequate i       Any change or new dx since previous visit:     Nutrition Diagnosis:   Underweight  related to Inadequate energy intake as  evidenced by BMI <18.5      Medical Nutrition Therapy Intervention:  [x]Individualized Meal Plan  0026-7004 kcal (35-40 g/kg)  63-84 g PRO (1.2-1.5 g/kg)  5013-1354 mL (1 mL/kcal) []Understanding Lab Values   []Basic Pathophysiology of Disease  []Food/Medication Interactions   []Food Diary [x]Exercise: Not necessarily encouraged for now until wt stabilizes   [x]Lifestyle/Behavior Modification Techniques:  incorporating new foods, increasing intake at meals, incorporating snacks.  []Medication, Mechanism of Action   []Label Reading: CHO/ Na/ Fat/ other_________ []Self Blood Glucose Monitoring   [x]Weight/BMI Goals: gain/lose/maintain: Avoid wt loss []Other -           Comprehension: []Excellent  []Very Good  [x]Good  []Fair   []Poor    Receptivity: []Excellent  []Very Good  [x]Good  []Fair   []Poor    Expected Compliance: []Excellent  []Very Good  [x]Good  []Fair   []Poor          Goals: 07/02/24:  Pt is going to increase her milk by 1/2c to 1c/day.   2.       3.       Goals: 06/04/24:  Pt is going to increase her milk by 1/2c to 1c/day.   2.       3.       Goals: 05/07/24:  Pt didn't feel she could set goals at this time.    2.       3.       Goals: 04/11/24:  Pt will aim to eat a 1/4 more of her typical serving of foods at each meal   2.       3.       Goals (initial) 03/26/24:  Starting day 2/3 post procedure to consume 2 slices of cheese per day   2.    Pt will ask her mom to bring her [employer] mac & cheese    3.       Goals (initial) 03/11/24:  Within the next week try either Julia mac & cheese or another type of mac & cheese   2.       3.     Goals (initial) 02/29/24:  Weekend of march 9th/10 make pancakes in the microwave   2.    Set an alarm in her phone to eat a string cheese in the evening with her daughter.    3.     Labs:  CMP  Lab Results   Component Value Date    K 4.2 12/27/2023     12/27/2023    CO2 27 12/27/2023    BUN 7 12/27/2023    CREATININE 0.78 12/27/2023    CALCIUM 9.6 12/27/2023    AST 11 08/22/2023    ALT 6 08/22/2023    ALKPHOS 36 08/22/2023    EGFR 94 12/27/2023       BMP  Lab Results   Component Value Date    CALCIUM 9.6 12/27/2023    K 4.2 12/27/2023    CO2 27 12/27/2023     12/27/2023    BUN 7 12/27/2023    CREATININE  "0.78 12/27/2023       Lipids  No results found for: \"CHOL\"  No results found for: \"HDL\"  No results found for: \"LDLCALC\"  No results found for: \"TRIG\"  No results found for: \"CHOLHDL\"    Hemoglobin A1C  Lab Results   Component Value Date    HGBA1C 5.7 (H) 12/27/2023       Fasting Glucose  No results found for: \"GLUF\"    Insulin     Thyroid  Lab Results   Component Value Date    TSH 8.41 (H) 12/27/2023       Hepatic Function Panel  Lab Results   Component Value Date    ALT 6 08/22/2023    AST 11 08/22/2023    ALKPHOS 36 08/22/2023       Celiac Disease Antibody Panel  No results found for: \"ENDOMYSIAL IGA\", \"GLIADIN IGA\", \"GLIADIN IGG\", \"IGA\", \"TISSUE TRANSGLUT AB\", \"TTG IGA\"   Iron  Lab Results   Component Value Date    IRON 71 05/01/2021    TIBC 394 05/01/2021    FERRITIN 18 05/01/2021            Tiffany Mijares RD, LDN  [facility]  [address] [place] [id number]    "

## 2024-07-03 ENCOUNTER — PREP FOR PROCEDURE (OUTPATIENT)
Dept: GASTROENTEROLOGY | Facility: CLINIC | Age: 49
End: 2024-07-03

## 2024-07-03 ENCOUNTER — TELEPHONE (OUTPATIENT)
Dept: GASTROENTEROLOGY | Facility: CLINIC | Age: 49
End: 2024-07-03

## 2024-07-03 ENCOUNTER — NURSE TRIAGE (OUTPATIENT)
Dept: OTHER | Facility: OTHER | Age: 49
End: 2024-07-03

## 2024-07-03 ENCOUNTER — DOCUMENTATION (OUTPATIENT)
Dept: GASTROENTEROLOGY | Facility: CLINIC | Age: 49
End: 2024-07-03

## 2024-07-03 DIAGNOSIS — K21.9 GASTROESOPHAGEAL REFLUX DISEASE WITHOUT ESOPHAGITIS: Primary | ICD-10-CM

## 2024-07-03 RX ORDER — FAMOTIDINE 40 MG/5ML
20 POWDER, FOR SUSPENSION ORAL 2 TIMES DAILY
Qty: 150 ML | Refills: 1 | Status: SHIPPED | OUTPATIENT
Start: 2024-07-03

## 2024-07-03 RX ORDER — SUCRALFATE ORAL 1 G/10ML
1 SUSPENSION ORAL
Qty: 1200 ML | Refills: 0 | Status: SHIPPED | OUTPATIENT
Start: 2024-07-03 | End: 2024-08-02

## 2024-07-03 NOTE — TELEPHONE ENCOUNTER
"Reason for Disposition   Difficulty swallowing is a chronic symptom (recurrent or ongoing AND present > 4 weeks)    Answer Assessment - Initial Assessment Questions  1. SYMPTOM: \"Are you having difficulty swallowing liquids, solids, or both?\"      Difficulty swallowing liquids. Sticking to ice cream and yogurt  per her usual.     2. ONSET: \"When did the swallowing problems begin?\"       Since Saturday     3. CAUSE: \"What do you think is causing the problem?\"       Dysaphia-GERD     4. CHRONIC/RECURRENT: \"Is this a new problem for you?\"  If no, ask: \"How long have you had this problem?\" (e.g., days, weeks, months)   Chronic issue but getting worse since Saturday.        5. OTHER SYMPTOMS: \"Do you have any other symptoms?\" (e.g., difficulty breathing, sore throat, swollen tongue, chest pain)      Pooling-- go away and comes back off and on throughout the day...when swallowing having a hard time getting fluids down. Has a stuck feeling in the throat.        Has a history of the dysphasia and GERD. This is the worst it has been. Afraid of going to ER due to sun allergy. Patient is really anxious and not reassured by this RN. Feels like it is getting worse. No apparent distress. She would really like contact from provider. Sent an ESC to on call provider. Provider stated there is no need to do anything urgently.  Also stated that patient can take an anti-acid like Tums, Mylanta, Pepcid at home to help. Also stated to do only small sips of fluid. Provider will call patient tomorrow. Reviewed recommendations with patient, verbalized understanding.    Protocols used: Swallowing Difficulty-ADULT-    "

## 2024-07-03 NOTE — PROGRESS NOTES
Spoke with pt over the phone after nurse navigator informed me overnight while on call that pt was concerned with worsening symptoms of liquid pooling in her mouth, which woke her up from sleep. Pt has been having heartburn and reflux symptoms worsening over the past few weeks. She states these symptoms have worsened before and at their peak they result in dysphagia with some discomfort in between her clavicles. She has still been unable to gain weight with last reported weight of 95 lbs. She has been eating ice cream and yogurt daily to increase calories, but has also been tolerating cheese, crackers and pizza. She is not taking any medications for GERD. Discussed GERD precautions with pt  including avoidance of trigger foods (potential foods include coffee, caffeine, chocolate, tomato-based products, spicy foods, fatty foods).     She has been following with Dr. Owusu regarding these symptoms who last saw her via telemedicine visit on 4/5. At that time plan was for EGD once she felt comfortable undergoing procedure and to record food diary. She suffers from solar urticaria and has significant anxiety surrounding leaving the house and undergoing procedures.    The following plan was outlined for pt and she is agreeable. As above, I reviewed GERD lifestyle changes and recommended she continue with soft foods and liquids (eliminating caffeine and carbonated beverages though) and avoid crackers, peanut butter, meat that could worsen dysphagia. Precautions for when to present to ED outlined. I prescribed famotidine 20 mg po bid in liquid form as well as sucralfate to take 4x daily or prn depending on effectiveness again liquid form per pt's request given difficulty ingesting pills. I will place order for EGD to hopefully arrange in the next few weeks pending Dr. Mcclain's availability. Regardless, she will keep upcoming appointment with him on 7/16. She did request that in order to feel as comfortable with procedure  and avoid complications associated with her urticaria she asked if a face shield would be available to protect her skin. She also mentioned that avoiding lights (fluorescent and halothane in particular) as much as possible would be appreciated. She asked if she would be able to be admitted following the procedure in order to undergo additional testing previously ordered including barium swallow and abd US. I informed her that these requests may be difficulty to arrange, but we will try our best. Will discuss further with Dr. Owusu and [facility] clerical pool.     Azam Dumont  GI Fellow

## 2024-07-03 NOTE — TELEPHONE ENCOUNTER
----- Message from Azam Dumont MD sent at 7/3/2024  1:08 PM EDT -----  Hey [facility] clerical pool and Dr. Owusu, I just wanted to let you both know I spoke to this pt, Cate Peña, (see my note for every detail) and she is ready to undergo EGD so order is placed. Prior to scheduling though she had many requests given her solar urticaria. The one I'm hoping we could arrange is she was wondering if we have a face shield she could wear during procedure (said when she had oral surgery they had one available) and avoid light as much as possible during the procedure. Do we have anything like that? She did also ask if she could have the barium swallow and abd US performed while she is there and possibly be admitted so these could be arranged, which I told her may be less likely, but said I would try. Let me know what you think about all that Dr. Owusu.     Thanks,    Azam

## 2024-07-05 NOTE — TELEPHONE ENCOUNTER
Called and scheduled patient for first available appt with Dr. Owusu. The patient did state that she is now down to 92 lbs and continuously dropping. I did advise that she should go to the ED if she continues to worsen. She stated that she is unable to do so because of all her Medical conditions that it is hard for her. She is requesting to be admitted before and after the procedure to the Hospital to be observed for complications and possible anaphylactic shock. She is also requesting to have a face shield and 25 mg benadryl. I did state that I would message Dr. Owusu and inform him of her requests.

## 2024-07-11 ENCOUNTER — NURSE TRIAGE (OUTPATIENT)
Dept: OTHER | Facility: OTHER | Age: 49
End: 2024-07-11

## 2024-07-11 NOTE — TELEPHONE ENCOUNTER
Called patient, discussed her concerns. Provided her reassurance and some recommendations - continue to eat and drink as able and sit upright while sleeping. Also provided ER precautions is she can't swallow, has trouble breathing, or feels her symptoms are severe. She would like a sooner EGD and to talk to Dr Owusu. Will forward to Dr Owusu and clerical

## 2024-07-11 NOTE — TELEPHONE ENCOUNTER
"Reason for Disposition  • Difficulty swallowing is a chronic symptom (recurrent or ongoing AND present > 4 weeks)    Answer Assessment - Initial Assessment Questions  1. SYMPTOM: \"Are you having difficulty swallowing liquids, solids, or both?\"      Patient reports that she has a history of dysphagia and GERD and has been drooling/having saliva pool in her mouth that started a few months ago.  She is calling this morning because it is happening more frequently now and in larger amounts.  She reports that in the middle of the night, she wakes up multiple times with a lot of saliva in her mouth that it is concerning her.  Last night being the worst, she was waking up with massive amounts of saliva \"every hour\" and is nervous that she is going to aspirate, especially due to having dysphagia.  She would like to know why this is happening so much more (6-8 times last night).  She also reports a \"liquid sensation in clavicles and eartips feel wet.\"    2. ONSET: \"When did the drooling problems begin?\"       A past few months ago    3. CAUSE: \"What do you think is causing the problem?\"       Unsure     4. CHRONIC/RECURRENT: \"Is this a new problem for you?\"  If no, ask: \"How long have you had this problem?\" (e.g., days, weeks, months)       Chronic, past few months, but becoming much worse    5. OTHER SYMPTOMS: \"Do you have any other symptoms?\" (e.g., difficulty breathing, sore throat, swollen tongue, chest pain)       Intermittent pain in throat for the past 1.5 weeks    Patient would like to know if she is supposed to take her Claritin that is necessary every morning due to her solar urticaria.  She is due to take medication soon and is requesting a return call from Mariano if he is available, but will speak to anyone who can speak to her if Mariano is not available.    Protocols used: Swallowing Difficulty-ADULT-AH    "

## 2024-07-11 NOTE — TELEPHONE ENCOUNTER
Patient called back in asking for an update. States that she doesn't want to go to sleep tonight without speaking with a provider first.

## 2024-07-11 NOTE — TELEPHONE ENCOUNTER
Pt. Called back, asking for advice related to worsening dysphagia and drooling, last night her saliva was worse and she is concerned she will aspirate and having more frequent episodes, pt. Has virtual appointment 7/16/24, pt. Is on liquid diet but was able to eat a muffin yesterday and her Claritin pill, this was a slight improvement from her normal, pt. Scheduled for EGD on 7/24/24, pt. 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 Is asking should she be concerned and should she be  drinking water and taking her meds, advised if she can tolerate water and swallowing pills she can continue with doing so, but is more concerned with saliva at night  and why she's not swallowing it , pt. Does wake up and can swallow saliva with a forceful swallow and does not have to spit it out, pt. Can tolerate thicker foods like yogurt and ice cream without choking or coughing, advised to eat and drink what she can tolerate and take meds as long as she is able to swallow them, please advise further

## 2024-07-11 NOTE — TELEPHONE ENCOUNTER
"Regarding: drooling/ appointment request  ----- Message from Anne MORALEZ sent at 7/11/2024  6:47 AM EDT -----  \"I am experiencing saliva sensation with drooling. I've never had this happen before. It wouldn't allow me to sleep. I want to speak to someone and possibly schedule an appointment, I feel as though I am just getting worse.\"    "

## 2024-07-12 DIAGNOSIS — R13.12 OROPHARYNGEAL DYSPHAGIA: Primary | ICD-10-CM

## 2024-07-16 ENCOUNTER — TELEMEDICINE (OUTPATIENT)
Age: 49
End: 2024-07-16
Payer: COMMERCIAL

## 2024-07-16 ENCOUNTER — CLINICAL SUPPORT (OUTPATIENT)
Dept: NUTRITION | Facility: HOSPITAL | Age: 49
End: 2024-07-16
Payer: COMMERCIAL

## 2024-07-16 DIAGNOSIS — R63.4 WEIGHT LOSS, UNINTENTIONAL: Primary | ICD-10-CM

## 2024-07-16 DIAGNOSIS — D50.9 IRON DEFICIENCY ANEMIA, UNSPECIFIED IRON DEFICIENCY ANEMIA TYPE: ICD-10-CM

## 2024-07-16 DIAGNOSIS — L56.3 SOLAR URTICARIA: Primary | ICD-10-CM

## 2024-07-16 DIAGNOSIS — R63.4 WEIGHT LOSS, UNINTENTIONAL: ICD-10-CM

## 2024-07-16 DIAGNOSIS — R13.19 ESOPHAGEAL DYSPHAGIA: ICD-10-CM

## 2024-07-16 PROCEDURE — 99214 OFFICE O/P EST MOD 30 MIN: CPT | Performed by: INTERNAL MEDICINE

## 2024-07-16 PROCEDURE — 97803 MED NUTRITION INDIV SUBSEQ: CPT

## 2024-07-16 NOTE — PROGRESS NOTES
" Nutrition Assessment Form    Patient Name: Cate Peña    YOB: 1975    Sex: Female     Assessment Date: 7/16/2024  Start Time:  2:30 Pm Stop Time: 3:00 pm Total Minutes: 30     Data:  Present at session: self   Parent/Patient Concerns/reason for visit: \"I didn't go to the hospital\"   Medical Dx/Reason for Referral: R79.89 (ICD-10-CM) - Low serum vitamin C & restrctive diet  Provider: Neftali Rose MD    Past Medical History:   Diagnosis Date    Costochondritis     Deviated septum 2023    left-with spur    Fibroid, uterine     Food poisoning 2023    Ovarian cyst     Seasonal allergies     Thyroid disease     Vitamin D deficiency        Current Outpatient Medications   Medication Sig Dispense Refill    albuterol (PROVENTIL HFA,VENTOLIN HFA) 90 mcg/act inhaler Inhale 2 puffs every 6 (six) hours as needed      ascorbic acid (VITAMIN C) 250 mg tablet Take 1,000 mg by mouth daily (Patient not taking: Reported on 4/5/2024)      EPINEPHrine (EPIPEN) 0.3 mg/0.3 mL SOAJ       ergocalciferol (VITAMIN D2) 50,000 units  (Patient not taking: Reported on 12/10/2021)      famotidine (PEPCID) 20 mg/2.5 mL oral suspension Take 2.5 mL (20 mg total) by mouth 2 (two) times a day 150 mL 1    loratadine (CLARITIN) 10 mg tablet Take 10 mg by mouth daily      sucralfate (CARAFATE) 1 g/10 mL suspension Take 10 mL (1 g total) by mouth 4 (four) times a day (with meals and at bedtime) 1200 mL 0    Synthroid 75 MCG tablet Take 1 tablet 6 days a week and no tab on Sunday. 26 tablet 0    Tirosint-SOL 75 MCG/ML SOLN 75 mcg liquid orally daily 30 mL 6     No current facility-administered medications for this visit.        Additional Meds/Supplements: N/A   Special Learning Needs/barriers to learning/any new barriers  N/A-pt thinks she may have autism   Height: 5'6 \" HC Readings from Last 5 Encounters:   No data found for HC      Weight: Today pt states she has been between 92-93#    06/04/24 Pt states she has been between 94# & " "96#   Wt Readings from Last 10 Encounters:   06/04/24 43.1 kg (95 lb)   04/05/24 40.8 kg (90 lb)   02/29/24 42.2 kg (93 lb)   12/07/23 41.7 kg (92 lb)   12/07/23 41.7 kg (92 lb)   10/13/23 43.5 kg (96 lb)   11/22/22 44.5 kg (98 lb)   11/16/22 44.9 kg (99 lb)   10/27/21 46.7 kg (103 lb)   07/15/21 43.5 kg (96 lb)     Estimated body mass index is 15.33 kg/m² as calculated from the following:    Height as of 6/4/24: 5' 6\" (1.676 m).    Weight as of 6/4/24: 43.1 kg (95 lb).   Recent Weight Change:  [x]Yes     []No  Amount: +2-3# x 1 mo      Energy Needs: 8897-2397 kcal (35-40 g/kg)  63-84 g PRO (1.2-1.5 g/kg)  3897-9853 mL (1 mL/kcal)   Allergies   Allergen Reactions    Metronidazole Anxiety, Dizziness, Palpitations and Shortness Of Breath    Other Anaphylaxis, Hives, Shortness Of Breath, Itching, Photosensitivity, Nausea Only, Swelling, Anxiety, Palpitations, Rash, Other (See Comments), Tinnitus, GI Intolerance, Dizziness, Headache, Tachycardia, Confusion, Chest Pain, Fatigue, Irritability, Syncope, Drowsiness and Nasal Congestion     Fluorescent and halogen lights     *Solar Urticaria- Sun and    *Pressure Urticaria    *Pesticides    Strawberry (Diagnostic) - Food Allergy Dermatitis, Hives, Itching, Rash and Shortness Of Breath    Sulfites - Food Allergy Anaphylaxis, Hives and Shortness Of Breath    Adhesive  [Medical Tape] Hives    American Cockroach Other (See Comments)    Cat Hair Extract Other (See Comments)    Dog Epithelium (Canis Lupus Familiaris) Other (See Comments)    Peanut Oil - Food Allergy Other (See Comments)     \"Borderline allergy\" per pt    Penicillin G     Pollen Extract Other (See Comments)    Shellfish-Derived Products - Food Allergy Other (See Comments)    Shrimp Extract Allergy Skin Test - Food Allergy Other (See Comments)    or intolerances When younger-was allergic to strawberries; 20 years ago had hives once when had frozen fish-thinks from sulfites;    Social History     Substance and Sexual " Activity   Alcohol Use No       Social History     Tobacco Use   Smoking Status Never   Smokeless Tobacco Never       Who shops? patient   Who cooks/cooking methods/Eating out/take out habits   patient     Exercise: Non at this time.     Other: ie: Sleep habits/ stress level/ work habits household-lives with ?/ food security    Prior Nutritional Counseling? [x]Yes     []No  When: 06/04/24     Why: Follow up with this writer        Diet Hx: as per pt s recall of 07/16/24,   Breakfast: yogurt   Lunch: Milk (1)         Dinner: Icecream pint       Snacks:    Other Notes/ Initial Assessment:    The patient was identified by name and date of birth. Cate Peña was informed that this is a telemedicine visit and that the visit is being conducted through [employer]. She agrees to proceed..  My office door was closed. No one else was in the room.  She acknowledged consent and understanding of privacy and security of the video platform. The patient has agreed to participate and understands they can discontinue the visit at any time. Patient is aware this is a billable service.         07/16/24  [facility] Pt was told that she cannot get directly admitted to the ED by GI and it wouldn't be guaranteed that if she went to the ED that she would get him as a dr for the EGD.  So they decided to schedule the EGD for 07/24/24 @ [facility] with her GI-Dr adan. Pt is unsure if she is going to be admitted after the procedure to inpatient status to try and get other tests/bloodwork done. Pt states she was told this depends on     Wt has been fluctuating between 90-92#.  Still trying to drink milk, diet still about the same.  Has been trying to eat spoon fulls of butter with salt on top. Hasn't tried to let the michelle's muffin top in milk. Pt is too afraid to eat cheese, crackers, other foods for fear she will choke. Hasn't had orange juice in days, PO has been very poor. .      Pt states she has been waking up in the middle of the night with saliva pooling in her mouth and even having a hard time swallowing her claritin. Pt told GI about this, they have had a few conversations over the last few days.     Discussed some ideas for softer foods pt may be able to tolerate: Baby food-make sure stage 1-containers or pouches, [place] breakfast essential powder, Refried beans beans or baked beans, Grits, farina, cream of whear-dry in cereal section, Bone broth, Heavy cream, Pudding-shelf stable, Fruit blend applesauce pouch, Spray cheese, Sour cream dip-already made or regular sour cream with ranch powder, Baby food melts-yogurt or the cereal ones.      Follow up scheduled tues 08/06/24   _______________________________________________________________________________________________________________________________________________________________________    07/02/24  Pt states her wt has gone from 94-95# to 92-93#.   PO intake has decreased over the last week. Pt notes that she has been having issues with swallowing, pt thought it was a bad batch of the brownie ice cream.  Pt contacted [employer] to see how they handle the product and she was told that they put the box of ice cream on the floor before putting it into the freezer, pt states she is not ok with that practice. Pt them went to look into getting the ice creams from [employer] instead but used a new reusable bag.  Pt stated the inside of the bag smelled and it made her ice cream smell so she didn't eat any of it.  Pt then had to try a new cayla & jerrys ice cream and did ok with it even though it had caramel in it until a few hours later when throat started to tighten up.     Pt contacted GI who are really pushing for pt to have an EGD.  Pt was suggested to take Claritin but even had an issue with swallowing the pill.  contacted GI who are really pushing for pt to have an EGD.  At this time d/t pt's strong concern about not being able to  "swallow, she is now agreeable to getting an EDG done but very concerned about sun allergy and environmental allergy.  Not only is pt agreeable to getting the EGD but would also like to stay overnight and have any and all labs/procedures done. Pt would like to be seen by a speech therapist r/t dysphagia. Pt has been in contact with her GI and is waiting to hear back from them about the proper course to take to get her in with consideration for her sun allergy.  States the dr is currently in surgery and she is waiting for them to call her back when he is done, she hopes to get within the next few days.    Pt has been drinking (3) 16oz bottles of water but pt is also concerned because it is taking her so much longer to drink even thin liquids.  This writer has had a conversation with pt's therapist since the last OP MNT appointment. Before being made aware that pt may be going to have procedure done, this writer was going to discuss with pt the real concern about her malnutrition and that being it has been over 4 months since the initial appointment, a conversation would need to be had about how to make additional moves give the real concern with malnutrition.  Will address this at next visit if appropriate.     Follow up scheduled 07/16/24  __________________________________________________________________________________________________________________________    06/04/24     Pt notes that her nausea and esophagitis has been at \"baseline\" only gets worse with Dove chocolates-which she has previously tolerated. Pt notes she is feeling a \"pins & needles\" pain all over her body.  Pt states while she knows that DM has been ruled out numerous times, she still feels like sometimes her BG spikes d/t how she feels. Pt knows eating some protein with a sugary food can help with slowing the spike in BG so trying to consume milk with cookies. Pt hasn't been using the microwave d/t often being more tired or nervous about the " "smell that the mac & cheese would emit and since she cannot open the windows d/t outside allergens.  Pt also concerned about being allergic to the light emitted from the microwave and concern about rust being in the microwave and that causing a reaction from her.  Pt note has the toaster--didn't as of the last appointment. States her family had tried it out and they tell her it doesn't have a smell.     Pt notes the stores she goes to have the safe foods that they have often been out of.    The last 2 weeks has been able ot eat 3 muffin tops per day. Prior to this week pt has been eating cheese -now up to 3 slices + more crackers-suggested pt trying to butter the crackers before eating but pt stated she has not liked this in the past.  Pt wasn't able to go out to the store Sunday d/t neighbor doing yard work earlier than usual and people were \"spraying\" outside. Pt has been getting chicken nuggets, fries, mc flurries most weekends. Still drinking orange. Suggested trying to get     Pt notes she has been weighing 94-96# over the last few weeks.  Pt wonders if this is r/t issues with her thyroid-last time checked was 12/2023. Pt knows she should be going to get blood work but not sure when she will be go out d/t the weather, outside allergens, people mowing. Suggested looking into if people can come to her house.    New foods: TappnGo cookies, a new Ar[employer] Jan[employer] ice cream flavor (chocolate therapy), has 2 other flavors but after pt opened them up the containers were only 3/4 of the way filled and this turned pt off.    On pt's list to try: more buttered pancakes, different ice cream flavors, increasing milk intake (currently @ 1c/d)    16oz bottles of water (3x/d), worried if has an additional cup of water (2c/d) + 1c of orange juice.    Pt has been applying to new jobs-2nd job.  Pt concerned about this d/t not being able to be outside, that it takes her about 2 hours to eat lunch. Pt states she does need this " "2nd job to be able to afford her perferred foods     At this time pt feels like it is more likely that she is able to increase her preferred foods vs trying new foods as a means to get more calories.       Follow up scheduled  07/02/24  _______________________________________________________________________________________    05/07/24    Pt provided a signed copy of     Pt feels like she is able to chew again as per normal since the procedure. Feels she is so used to eating on the non-procedure side.  Pt is working on trying to remember to eat on the other side.     Pt doesn't feel she has made much progress. States she has had some health setbacks which have affected her appetite/PO intake.  Pt attempted orange juice to get more vitamin C. States she had an OCD episode ardound the same time and also had more phleghm with the OJ.     Pt did try out one new food-[employer] soft chocolate chip cookies, felt she had an allergic reaction-used to tolerate them but this was a set back.  Pt also had an issue with running out of her \"safe\" hand soap so had a reaction to a new soap she has tried.  States she now has a burning throat irritation, feels it is causing her to have styes in her eye, also thinks she may have a UTI from it. Pt is fearful that trying a new soap will put her into anaphylaxis shock.     Pt feels these issues have been making her eat less d/t allergy.  Pt also notes her dear of choking while eating if she is trying to talk to someone.    Long term goal is to go with daughter to [employer] and eat there. Pt wants to be able to give pt more \"regular\" experiences.  Suggested that pt try to get take out from there to try different items to see if     Follow up:  06/04/24    ___________________________________________________________________________________________________  04/11/24      Pt with GI f/u 04/05/24 (virtual).  Provider had recommended an EDG but pt is not feeling up to it at this time d/t solar " urticaria and light sensitivity. Pt confirms this and doesn't know what she can do about that at this time but is considering to do it in the fall when outside allergens are reduced. Pt does state that she was looking to see how things went with her dental procedure r/t tolerance, support system.     This writer asked pt if  had mentioned EOE, she stated they have but can't assess for that until she has an EGD.    Pt has tried tasty cake cookie bars again after a few years of not having them-states her eats started to close up and her throat became tight. Pt thinks her esophagitis was triggered. Pt followed up with 2 providers about this, one things it is GERD related, the other more allergy related. This has increased pt's anxiety with trying new foods.     Pt has reintroduced low-acid, no pulp orange juice into diet-pt with hx of low vitamin C levels. Pt is chewing better but still a little more hesitant to chew hard foods on that side-has been ok with crackers. Pt feels like she will feel more confident after the 1 mo post procedure suad.      Pt has tried pancakes with butter heated up in the microwave and tolerated them. Plans to introduce them more regularly. Pt states she is a little nervous about making other foods incase other foods will create certain smells that are noxious to her.    Pt also ate some fast food chicken nuggets and fries this past Sunday and ate a larger portion than she has had in the past.   It takes pt a long time to eat her pint of icecream and by the time that she is done it is like 2 pm and then isnt as hungry by the time dinner comes. Pt says she plans to continue to eat the whole pint at once and divulged some new information. States that she only uses disposable plates/utensils/cups  for fear of a reaction to the chemicals in cleaning products and what the  will emit when opening it up after running it.  Suggested pt still eat from the carton but 1/2 in the AM & 1/2 in  the PM but she would still rather eat it all at once.     Pt also bought felipe ring ding cookies as something to add in as another snack.     Overall, since last appointment pt has increased anxiety around the likely kasper of having an allergic reaction to new foods even though she knows increasing new foods will help increase foods that can give her more calories.  Suggested for now, pt work on increasing the portions of her safe foods-discussed what that may look like r/t the foods she is eating. Pt thinks this is a good plan of action for now.     Still working on getting appropriate KENROY for our department to be able to have pt fill out to give to her therapist.     Follow up: 04/25/24 @ 11 am  ___________________________________________________________________________________________  03/26/24  Pt with a tooth removed yesterday. States she had a cup of milk last night and did ok with that. This morning pt had some yogurt and did ok with it as well.  Prior to procedure pt did purchase some more yogurt, milk, ice cream.      Overall pt has been doing ok since the procedure, didn't go under general anesthesia but took benadryl instead and is still feeling a little woozy.     Pt states she may ask her mom to  [employer] mac & cheese, she has gotten some frozen mac & cheese but hasn't tried the microwave yet.  Pt notes lack of support as being a hindrance to things like trying different take out, getting her toaster oven/microwave working/.     Pt reports she is feeling hungry--which is weird for her since she doesn't usually feel hungry.  Pt is excited for the tooth to heal so she can chew on that side again (hasn't been able to try without pain in almost a year).     Since last appointment pt wasn't able to try the Julia mac & cheese but has retried cheeses and some other cheeses she hasn't tried in a bit (trying different brands), also different types of chocolate cookies.     Pt states she has an appointment  "with her therapist this week for the first time in 2 weeks. States she still isnt sure how helpful it will be for this writer to talk to her therapist also given how busy she says her therapist is.     Pt has been trying to monitor fluids-milk, water, to help prevent dehydration.   Pt plans to try small smears of cream cheese, smushing up american cheese. Pt has cake she wants to try-suggested in a day or so maybe to try letting it soak in milk to get softer. Discussed different ways to try and get electrolytes.     Pt hasn't weighted herself yet, states she was nervous to weigh herself prior to the procedure incase she had lost some wt and it made her stressed out. Pt isnt sure at this time when she plans to weigh herself again-this writer suggested maybe not until she is back to her \"regular\" eating habits  for about a week unless she starts to feel ill or notices her clothes feeling looser.      Next follow up scheduled: 24      _____________________________________________________________________________________________________________________  24    Pt stated she was a little late to the appointment, had a work meeting and stated she was eating her pint of ice cream and it takes her a long time to eat. Pt did not set up her microwave, states that she had to order some new clothes and the chemicals in the packaging gave her an allergic reaction and created a smell in her house-pt was also concerned maybe there was a mouse that  in the wall. So pt stated that those things plus more contributed to the barriers as to why she didn't meet her goals.     States she had to order new clothes   Pt stated she was able to try the string cheese today but doesn't think she can try a new food today. States they are spraying outside and worried if she has a reaction she wouldn't be able to go outside to go to the hospital. Pt states even if it is a food she has had in the past-what if the company changed their " ingredients or what if she develops a new allergy. Pt did have some Boars head and Sargento cheese-that she has had in the past-since she couldn't find her preferred cheeses. States she had to go out to a 3rd store and being out in the sun and just being outside more made her exhausted. Pt was also trying to check out different areas of the store and feels the different parts of the store had different lighting and that affected her.  She also felt was a bonus she picked up some more yogurt and hopes to try it in the next few days-even got raspberry instead of just vanilla.  Wheat thins*    Pt states she was ok with this writer talking with her therapist who she has been seeing over the last 20 years but pt states her therapist is so busy and testifies [place] so she may not be able to get back to this writer. Pt would prefer not to tell this writer the name of their therapist (out of ne to reach out to there therapist and pass on this writer's Therapist has helped pt with working with OCD r/t transporting foods from the store to help reduce the fear of food borne illness.       Discussed concerns about pt with adequate nutrition after having dental procedure with not as likely to be going out to get food and she will likely have limitations with food textures, she also may not be feeling well and appetite may decrease.  Pt feels it takes her longer to eat lunch and dinner.  Pt thinks that she may be more likely to increase her intake at breakfast-states she may be more likely to work more food into that meal.  When it came to talking about trying new foods and being specific about it when she can try a food, pt stated she understood SMART goals but that she just never knows.  Pt was willing to set a goal-see goal. Once pt is healed will discuss trying new foods, the main goal until then is adequate kcal-pt agreeable with this.     F/U the day after her procedure-as pt  "request-03/26/24  ____________________________________________________________________________________________________________________________________________________________________________________________________________________________  02/29/24    Within a 6mo to a year time span pt developed COVID (2020) then had to move out of apartment and developed other health issues and this is when the majority of the wt loss occurred. Pt works from home, 2 jobs. Pt also states she had her 1st wisdom tooth removal and was put under anesthesia and that also affected wt loss. Pt is supposed to have another tooth pulled at the end of March.     Pt notes a rare allergy to light as well as pesticides and weed killers (long standing allergy but it has gotten worse). States this hinders access to foods. Pt states she is a single parent of an adult disabled child-also has restrictive diet d/t autism. States her daughter also has smell issues. States she cannot have food delivered d/t pesticide allergy. Pt also developed esophagitis, intermittent, takes a long time for her to eat. States d/t health issues she cannot be tested for cancer. Pt notes seeing a therapist for years but they do not have experience with her health issues. Pt also notes bouts of nausea, also has issues with food smells and tastes--they all affect appetite and she will lose wt and not be able to gain it back. Pt denies wanting to lose wt, pt would like to eat foods again. Pt states many of her preferred snack foods have been phased out (notes cheese puffs).  Pt nervous to try new foods or try foods she has previously cut out. Pt notes her oven makes a bad smell since it is new so she doesn't use it. Pt has a new microwave at home but hasn't taken it out yet, states she hasn't had time to get it out and use it, states family has a Grid20/20aster oven and they are trying to \"burn it out for her\". Pt has had some blood work testing done for allergy-saw shellfish  Pt " reports getting food poisoning over Thanksgiving and now is paranoid/concerned about this happening again.  Pt notes fear of trying supplements d/t ingredients.  Pt notes she would rather try cereal as an additional source of kcal va oral supplements. Pt states an issue is being concerned about what the ingredients of the food are but pt couldn't state what ingredients are actually ones she would avoid other than MSG and some preservatives. Pt states he was willing to try eating yogurt-she gets it for her daughter but forgets to eat it, same with string cheese.    As per meeting and diet hx ps is observed to meet the criteria for ARFID. Pt with significant wt loss     Labs of note: 12/27/23 D <14, A1C 5.7, TSH 8.41, elevated lipid panel, elevated TPO, very low vitamin C    Pt has also cut foods out d/t feeling like they make her feel like she has marbles in her mouth and then her throat will start to constrict.     Pt would like to be able to eat chips, french fries, a lot of frozen foods and italian foods.     Pt feels she would  As per conversation, lack of available way for pt to consume foods. Pt has microwave and just has to wash and put in the turntable. Most of the appointment today was gather information from pt, build rapport, discuss possible routes to go moving forward.  See goals.     Follow up scheduled for March 11 @ 1:30 pm-will work on pt consuming a new food during this appointment.        Nutrition Diagnosis:   Disordered eating pattern  related to multiple health issues, allergies as evidenced by wt loss, inadequate i       Any change or new dx since previous visit:     Nutrition Diagnosis:   Underweight  related to Inadequate energy intake as  evidenced by BMI <18.5      Medical Nutrition Therapy Intervention:  [x]Individualized Meal Plan  5755-5592 kcal (35-40 g/kg)  63-84 g PRO (1.2-1.5 g/kg)  9278-0049 mL (1 mL/kcal) []Understanding Lab Values   []Basic Pathophysiology of Disease  []Food/Medication Interactions   []Food Diary [x]Exercise: Not necessarily encouraged for now until wt stabilizes   [x]Lifestyle/Behavior Modification Techniques:  incorporating new foods, increasing intake at meals, incorporating snacks.  []Medication, Mechanism of Action   []Label Reading: CHO/ Na/ Fat/ other_________ []Self Blood Glucose Monitoring   [x]Weight/BMI Goals: gain/lose/maintain: Avoid wt loss []Other -           Comprehension: []Excellent  []Very Good  [x]Good  []Fair   []Poor    Receptivity: []Excellent  []Very Good  [x]Good  []Fair   []Poor    Expected Compliance: []Excellent  []Very Good  [x]Good  []Fair   []Poor          Goals: 07/02/24:  Pt is going to increase her milk by 1/2c to 1c/day.   2.       3.       Goals: 06/04/24:  Pt is going to increase her milk by 1/2c to 1c/day.   2.       3.       Goals: 05/07/24:  Pt didn't feel she could set goals at this time.    2.       3.       Goals: 04/11/24:  Pt will aim to eat a 1/4 more of her typical serving of foods at each meal   2.       3.       Goals (initial) 03/26/24:  Starting day 2/3 post procedure to consume 2 slices of cheese per day   2.    Pt will ask her mom to bring her [employer] mac & cheese    3.       Goals (initial) 03/11/24:  Within the next week try either Julia mac & cheese or another type of mac & cheese   2.       3.     Goals (initial) 02/29/24:  Weekend of march 9th/10 make pancakes in the microwave   2.    Set an alarm in her phone to eat a string cheese in the evening with her daughter.    3.     Labs:  CMP  Lab Results   Component Value Date    K 4.2 12/27/2023     12/27/2023    CO2 27 12/27/2023    BUN 7 12/27/2023    CREATININE 0.78 12/27/2023    CALCIUM 9.6 12/27/2023    AST 11 08/22/2023    ALT 6 08/22/2023    ALKPHOS 36 08/22/2023    EGFR 94 12/27/2023       BMP  Lab Results   Component Value Date    CALCIUM 9.6 12/27/2023    K 4.2 12/27/2023    CO2 27 12/27/2023     12/27/2023    BUN 7 12/27/2023    CREATININE  "0.78 12/27/2023       Lipids  No results found for: \"CHOL\"  No results found for: \"HDL\"  No results found for: \"LDLCALC\"  No results found for: \"TRIG\"  No results found for: \"CHOLHDL\"    Hemoglobin A1C  Lab Results   Component Value Date    HGBA1C 5.7 (H) 12/27/2023       Fasting Glucose  No results found for: \"GLUF\"    Insulin     Thyroid  Lab Results   Component Value Date    TSH 8.41 (H) 12/27/2023       Hepatic Function Panel  Lab Results   Component Value Date    ALT 6 08/22/2023    AST 11 08/22/2023    ALKPHOS 36 08/22/2023       Celiac Disease Antibody Panel  No results found for: \"ENDOMYSIAL IGA\", \"GLIADIN IGA\", \"GLIADIN IGG\", \"IGA\", \"TISSUE TRANSGLUT AB\", \"TTG IGA\"   Iron  Lab Results   Component Value Date    IRON 71 05/01/2021    TIBC 394 05/01/2021    FERRITIN 18 05/01/2021            Tiffany Mijares RD, LDN  [facility]  [address] [place] [phone]    "

## 2024-07-19 NOTE — H&P (VIEW-ONLY)
Virtual Regular Visit  Name: Cate Peña      : 1975      MRN: [id number]  Encounter Provider: Jer Owusu MD  Encounter Date: 2024   Encounter department: [facility]    Verification of patient location:    Patient is located at Home in the following state in which I hold an active license [place]    Assessment & Plan   1. Gastroesophageal reflux disease without esophagitis  2. Esophageal dysphagia  She has reflux and dysphagia symptoms and I had recommended an upper endoscopy but she does not feel comfortable doing this because of her solar urticaria and light sensitivity.  She plans to keep her appointment for her upcoming upper endoscopy and will see the surgical optimization clinic prior to the procedure date.  I asked her to keep a food diary and monitor and correlate her food intake with any recurrent symptoms.  I will schedule her for a follow-up visit in about 3 to 4 months.     3. Iron deficiency anemia, unspecified iron deficiency anemia type  4. Weight loss, unintentional  I encouraged her to follow-up for upper endoscopy and colonoscopy when she feels comfortable.  She plans to follow-up for the upper endoscopy and I scheduled her for a referral to the surgical optimization clinic because of her concerns regarding her solar urticaria.       Encounter provider Jer Owusu MD    The patient was identified by name and date of birth. Cate Peña was informed that this is a telemedicine visit and that the visit is being conducted through  [employer] ..  My office door was closed. No one else was in the room.  She acknowledged consent and understanding of privacy and security of the video platform. The patient has agreed to participate and understands they can discontinue the visit at any time.    Patient is aware this is a billable service.     History of Present Illness     Cate Peña is a 49 y.o. female who continues to have difficulty gaining weight  despite seeing a nutritionist and working with a nutritionist.  Since her last visit she has not noticed a significant change in her weight.  She has nausea but denies vomiting.  She has not had any recent constipation or diarrhea.  She continues to have mild intermittent reflux and dysphagia.  She is very anxious and concerned about her upcoming upper endoscopy particularly because of her solar urticaria.    REVIEW OF SYSTEMS:    CONSTITUTIONAL: Denies any fever, chills, rigors, and weight loss.  HEENT: No earache or tinnitus. Denies hearing loss or visual disturbances.  CARDIOVASCULAR: No chest pain or palpitations.   RESPIRATORY: Denies any cough, hemoptysis, shortness of breath or dyspnea on exertion.  GASTROINTESTINAL: As noted in the History of Present Illness.   GENITOURINARY: No problems with urination. Denies any hematuria or dysuria.  NEUROLOGIC: No dizziness or vertigo, denies headaches.   MUSCULOSKELETAL: Denies any muscle or joint pain.   SKIN: Denies skin rashes or itching.   ENDOCRINE: Denies excessive thirst. Denies intolerance to heat or cold.  PSYCHOSOCIAL: Denies depression but reports anxiety. Denies any recent memory loss.       PHYSICAL EXAMINATION:  Appearance and vitals taken from home devices    General Appearance:   Alert, cooperative, no distress, thin, anxious   HEENT:  Normocephalic, atraumatic, anicteric. Neck supple, symmetrical, trachea midline.   Lungs:   Equal chest rise and unlabored breathing, normal effort, no coughing.   Cardiovascular:   No visualized JVD.   Abdomen:   No abdominal distension.   Skin:   No jaundice, rashes, or lesions.    Musculoskeletal:   Normal range of motion visualized.   Psych:  Normal affect and normal insight.   Neuro:  Alert and appropriate.       Visit Time  Total Visit Duration: 21

## 2024-07-19 NOTE — PROGRESS NOTES
Virtual Regular Visit  Name: Cate Peña      : 1975      MRN: [id number]  Encounter Provider: Jer Owusu MD  Encounter Date: 2024   Encounter department: [facility]    Verification of patient location:    Patient is located at Home in the following state in which I hold an active license [place]    Assessment & Plan   1. Gastroesophageal reflux disease without esophagitis  2. Esophageal dysphagia  She has reflux and dysphagia symptoms and I had recommended an upper endoscopy but she does not feel comfortable doing this because of her solar urticaria and light sensitivity.  She plans to keep her appointment for her upcoming upper endoscopy and will see the surgical optimization clinic prior to the procedure date.  I asked her to keep a food diary and monitor and correlate her food intake with any recurrent symptoms.  I will schedule her for a follow-up visit in about 3 to 4 months.     3. Iron deficiency anemia, unspecified iron deficiency anemia type  4. Weight loss, unintentional  I encouraged her to follow-up for upper endoscopy and colonoscopy when she feels comfortable.  She plans to follow-up for the upper endoscopy and I scheduled her for a referral to the surgical optimization clinic because of her concerns regarding her solar urticaria.       Encounter provider Jer Owusu MD    The patient was identified by name and date of birth. Cate Peña was informed that this is a telemedicine visit and that the visit is being conducted through  [employer] ..  My office door was closed. No one else was in the room.  She acknowledged consent and understanding of privacy and security of the video platform. The patient has agreed to participate and understands they can discontinue the visit at any time.    Patient is aware this is a billable service.     History of Present Illness     Cate Peña is a 49 y.o. female who continues to have difficulty gaining weight  despite seeing a nutritionist and working with a nutritionist.  Since her last visit she has not noticed a significant change in her weight.  She has nausea but denies vomiting.  She has not had any recent constipation or diarrhea.  She continues to have mild intermittent reflux and dysphagia.  She is very anxious and concerned about her upcoming upper endoscopy particularly because of her solar urticaria.    REVIEW OF SYSTEMS:    CONSTITUTIONAL: Denies any fever, chills, rigors, and weight loss.  HEENT: No earache or tinnitus. Denies hearing loss or visual disturbances.  CARDIOVASCULAR: No chest pain or palpitations.   RESPIRATORY: Denies any cough, hemoptysis, shortness of breath or dyspnea on exertion.  GASTROINTESTINAL: As noted in the History of Present Illness.   GENITOURINARY: No problems with urination. Denies any hematuria or dysuria.  NEUROLOGIC: No dizziness or vertigo, denies headaches.   MUSCULOSKELETAL: Denies any muscle or joint pain.   SKIN: Denies skin rashes or itching.   ENDOCRINE: Denies excessive thirst. Denies intolerance to heat or cold.  PSYCHOSOCIAL: Denies depression but reports anxiety. Denies any recent memory loss.       PHYSICAL EXAMINATION:  Appearance and vitals taken from home devices    General Appearance:   Alert, cooperative, no distress, thin, anxious   HEENT:  Normocephalic, atraumatic, anicteric. Neck supple, symmetrical, trachea midline.   Lungs:   Equal chest rise and unlabored breathing, normal effort, no coughing.   Cardiovascular:   No visualized JVD.   Abdomen:   No abdominal distension.   Skin:   No jaundice, rashes, or lesions.    Musculoskeletal:   Normal range of motion visualized.   Psych:  Normal affect and normal insight.   Neuro:  Alert and appropriate.       Visit Time  Total Visit Duration: 21

## 2024-07-23 ENCOUNTER — NURSE TRIAGE (OUTPATIENT)
Age: 49
End: 2024-07-23

## 2024-07-23 ENCOUNTER — NURSE TRIAGE (OUTPATIENT)
Dept: OTHER | Facility: OTHER | Age: 49
End: 2024-07-23

## 2024-07-23 RX ORDER — SODIUM CHLORIDE, SODIUM LACTATE, POTASSIUM CHLORIDE, CALCIUM CHLORIDE 600; 310; 30; 20 MG/100ML; MG/100ML; MG/100ML; MG/100ML
125 INJECTION, SOLUTION INTRAVENOUS CONTINUOUS
Status: CANCELLED | OUTPATIENT
Start: 2024-07-23

## 2024-07-23 NOTE — TELEPHONE ENCOUNTER
Patient just calling to confirm her instructions for her EGD for tomorrow. That she could have her normal diet today and NPO after midnight? I confirmed that she was correct. Her procedure is for 0730 tomorrow.

## 2024-07-23 NOTE — TELEPHONE ENCOUNTER
"Regarding: Dietary Instructions for EGD in Am.  ----- Message from Meghann WILSON sent at 7/23/2024  6:50 PM EDT -----  \" I am having an EGD tomorrow morning and I need Clarification for Dietary Instructions. \"    "

## 2024-07-23 NOTE — TELEPHONE ENCOUNTER
"Telemed 07/16/24, EGD 07/24/24    Pt transferred to myself by Shirlene.    Pt reports she is down to 88 pounds due to difficulty swallowing. Pt is consuming 2 pints of  softened [employer]'s daily because \"its the only thing I could get down\". Advised to add Boost Very High Calorie brand to her liquid diet with a goal of 2 servings per day. This drink contains 530 calories, 22 g of protein and additional vitamins and minerals per 8 oz.    Pt states she lacks energy and remains sedentary most of the day out of fear of burning excess calories. Pt inquiring at what point should she go to the hospital due to excessive weight loss?    Pt is interested in esophageal manometry if indicated.    Pt requesting thoughts on Flex EGD eval vs. FL Barium swallow.    Pt inquiring if she will be having esophageal dilation during tomorrow's procedure?    Reason for Disposition   Nursing judgment    Answer Assessment - Initial Assessment Questions  1. REASON FOR CALL or QUESTION: \"What is your reason for calling today?\" or \"How can I best help you?\" or \"What question do you have that I can help answer?\"      Please see note    Protocols used: Information Only Call - No Triage-ADULT-[place]    "

## 2024-07-24 ENCOUNTER — ANESTHESIA (OUTPATIENT)
Dept: GASTROENTEROLOGY | Facility: HOSPITAL | Age: 49
End: 2024-07-24

## 2024-07-24 ENCOUNTER — HOSPITAL ENCOUNTER (OUTPATIENT)
Dept: GASTROENTEROLOGY | Facility: HOSPITAL | Age: 49
Setting detail: OUTPATIENT SURGERY
Discharge: HOME | End: 2024-07-24
Admitting: INTERNAL MEDICINE
Payer: COMMERCIAL

## 2024-07-24 ENCOUNTER — ANESTHESIA EVENT (OUTPATIENT)
Dept: GASTROENTEROLOGY | Facility: HOSPITAL | Age: 49
End: 2024-07-24

## 2024-07-24 ENCOUNTER — NURSE TRIAGE (OUTPATIENT)
Age: 49
End: 2024-07-24

## 2024-07-24 VITALS
BODY MASS INDEX: 14.14 KG/M2 | HEIGHT: 66 IN | WEIGHT: 88 LBS | SYSTOLIC BLOOD PRESSURE: 101 MMHG | DIASTOLIC BLOOD PRESSURE: 58 MMHG | OXYGEN SATURATION: 100 % | TEMPERATURE: 97.7 F | RESPIRATION RATE: 18 BRPM | HEART RATE: 72 BPM

## 2024-07-24 DIAGNOSIS — K21.9 GASTROESOPHAGEAL REFLUX DISEASE WITHOUT ESOPHAGITIS: ICD-10-CM

## 2024-07-24 DIAGNOSIS — R13.19 ESOPHAGEAL DYSPHAGIA: ICD-10-CM

## 2024-07-24 DIAGNOSIS — R63.4 UNINTENTIONAL WEIGHT LOSS OF MORE THAN 10 POUNDS: ICD-10-CM

## 2024-07-24 PROCEDURE — 43239 EGD BIOPSY SINGLE/MULTIPLE: CPT | Performed by: INTERNAL MEDICINE

## 2024-07-24 PROCEDURE — 88342 IMHCHEM/IMCYTCHM 1ST ANTB: CPT | Performed by: PATHOLOGY

## 2024-07-24 PROCEDURE — 88305 TISSUE EXAM BY PATHOLOGIST: CPT | Performed by: PATHOLOGY

## 2024-07-24 PROCEDURE — 88341 IMHCHEM/IMCYTCHM EA ADD ANTB: CPT | Performed by: PATHOLOGY

## 2024-07-24 RX ORDER — SODIUM CHLORIDE, SODIUM LACTATE, POTASSIUM CHLORIDE, CALCIUM CHLORIDE 600; 310; 30; 20 MG/100ML; MG/100ML; MG/100ML; MG/100ML
125 INJECTION, SOLUTION INTRAVENOUS CONTINUOUS
Status: DISCONTINUED | OUTPATIENT
Start: 2024-07-24 | End: 2024-07-28 | Stop reason: HOSPADM

## 2024-07-24 RX ORDER — DEXAMETHASONE SODIUM PHOSPHATE 10 MG/ML
INJECTION, SOLUTION INTRAMUSCULAR; INTRAVENOUS AS NEEDED
Status: DISCONTINUED | OUTPATIENT
Start: 2024-07-24 | End: 2024-07-24

## 2024-07-24 RX ORDER — LIDOCAINE HYDROCHLORIDE 20 MG/ML
INJECTION, SOLUTION EPIDURAL; INFILTRATION; INTRACAUDAL; PERINEURAL AS NEEDED
Status: DISCONTINUED | OUTPATIENT
Start: 2024-07-24 | End: 2024-07-24

## 2024-07-24 RX ORDER — MIDAZOLAM HYDROCHLORIDE 2 MG/2ML
INJECTION, SOLUTION INTRAMUSCULAR; INTRAVENOUS CONTINUOUS PRN
Status: DISCONTINUED | OUTPATIENT
Start: 2024-07-24 | End: 2024-07-24

## 2024-07-24 RX ORDER — PROPOFOL 10 MG/ML
INJECTION, EMULSION INTRAVENOUS AS NEEDED
Status: DISCONTINUED | OUTPATIENT
Start: 2024-07-24 | End: 2024-07-24

## 2024-07-24 RX ADMIN — SODIUM CHLORIDE, SODIUM LACTATE, POTASSIUM CHLORIDE, AND CALCIUM CHLORIDE: .6; .31; .03; .02 INJECTION, SOLUTION INTRAVENOUS at 07:15

## 2024-07-24 RX ADMIN — PROPOFOL 30 MG: 10 INJECTION, EMULSION INTRAVENOUS at 07:51

## 2024-07-24 RX ADMIN — DEXAMETHASONE SODIUM PHOSPHATE 5 MG: 10 INJECTION, SOLUTION INTRAMUSCULAR; INTRAVENOUS at 07:45

## 2024-07-24 RX ADMIN — PROPOFOL 80 MG: 10 INJECTION, EMULSION INTRAVENOUS at 07:45

## 2024-07-24 RX ADMIN — DEXAMETHASONE SODIUM PHOSPHATE 5 MG: 10 INJECTION, SOLUTION INTRAMUSCULAR; INTRAVENOUS at 07:48

## 2024-07-24 RX ADMIN — LIDOCAINE HYDROCHLORIDE 60 MG: 20 INJECTION, SOLUTION EPIDURAL; INFILTRATION; INTRACAUDAL at 07:45

## 2024-07-24 RX ADMIN — PROPOFOL 40 MG: 10 INJECTION, EMULSION INTRAVENOUS at 07:48

## 2024-07-24 NOTE — TELEPHONE ENCOUNTER
"SPOKE WITH PT, EGD THIS AM, REPORTS SHE HAD A GREAT EXPERIENCE. MANY POST PROCEDURE QUESTIONS.  1. PERCENTAGE OF PT'S WITH BIOPSY POSITIVE CELIAC DISEASE WITH NEGATIVE BLOOD WORK?    2.WOULD THE USE OF DEXAMETHASONE ALTER APPEARANCE/RESULTS ON SCOPE EXAM?    3. PT WITH MILD PAIN/DIFFICULTY WITH SWALLOWING- IS THIS CAUSE OF HER WT LOSS?    4. SHOULD SHE BE CONCERNED WITH HILL CLASSIFICATION GRADE I HIATAL HERNIA?    5. WOULD BARIUM SWALLOW OR ESOPHAGEAL MANOMETRY BE INDICATED AT THIS TIME?    6. SPEECH REFERRAL FOR POSSIBLE EXERCISES, NEUROLOGY REFERRAL FOR NERVE TINGLING WHEN SWALLOWING?    7. PT HAS FAMILY MEMBERS WITH MALABSORPTION DISORDERS, CAN SHE BE EVALUATED OR REFERRED FOR THIS?            Reason for Disposition   Information only question and nurse able to answer    Answer Assessment - Initial Assessment Questions  1. REASON FOR CALL or QUESTION: \"What is your reason for calling today?\" or \"How can I best help you?\" or \"What question do you have that I can help answer?\"      SPOKE WITH PT EGD THIS AM, MANY POST PROCEDURE QUESTIONS.  1. PERCENTAGE OF PT'S WITH BIOPSY POSITIVE CELIAC DISEASE WITH NEGATIVE BLOOD WORK?    2.WOULD THE USE OF DEXAMETHASONE ALTER APPEARANCE/RESULTS ON SCOPE EXAM?    3. PT WITH MILD PAIN/DIFFICULTY WITH SWALLOWING- IS THIS CAUSE OF HER WT LOSS?    4. SHOULD SHE BE CONCERNED WITH HILL CLASSIFICATION GRADE I HIATAL HERNIA?    5. WOULD BARIUM SWALLOW OR ESOPHAGEAL MANOMETRY BE INDICATED AT THIS TIME?    6. SPEECH REFERRAL FOR POSSIBLE EXERCISES, NEUROLOGY REFERRAL FOR NERVE TINGLING WHEN SWALLOWING?    7. PT HAS FAMILY MEMBERS WITH MALABSORPTION DISORDERS, CAN SHE BE EVALUATED OR REFERRED FOR THIS?    Protocols used: Information Only Call - No Triage-ADULT-[place]    "

## 2024-07-24 NOTE — DISCHARGE INSTRUCTIONS
14-year-old who is here for acute onset of swelling of his lower lip.  Mom states he awoke this morning and immediately complained of a swollen lip.  She brought a picture which did show significant swelling of his lower lip, more prominent on the right side.  He also felt like his throat was swollen at the time.  Mom states he was breathing normally and talking normally with no wheezing.    He did not have anything to eat before the swelling began.  Nothing new to eat last evening.  He has no known history of food or other allergies.  He does have eczema.    He has been battling chapped lips for most of the winter, does not like to put anything on topically.    He has a history of significant cold sore eruptions after using Dupixent a few years ago.  He has had recurrent cold sores from time to time.  He did have a prescription for acyclovir so we will refill that.    Mom has not given any medication and the swelling has subsided since this morning.    On exam he is afebrile, conversant, in no distress.  TMs are normal, oropharynx is benign.  No tonsil enlargement, no erythema.  Neck is supple without adenopathy.  He does have chapping of his upper and lower lips with swelling of the lower lip.  No evidence of any herpetic lesions.  No intraoral lesions, his gums are not swollen nor red.    Heart and lung exams are normal.    Impression: Swollen lower lip.  Unclear etiology.  No evidence of imminent herpes lesion.    Plan: Will have mom give antihistamine.  Monitor.  Also refilled prescription for topical acyclovir to use as needed.   Upper Endoscopy   WHAT YOU NEED TO KNOW:   An upper endoscopy is also called an upper gastrointestinal (GI) endoscopy, or an esophagogastroduodenoscopy (EGD). It is a procedure to examine the inside of your esophagus, stomach, and duodenum (first part of the small intestine) with a scope. You may feel bloated, gassy, or have some abdominal discomfort after your procedure. Your throat may be sore for 24 to 36 hours. You may burp or pass gas from air that is still inside your body.         DISCHARGE INSTRUCTIONS:   Seek care immediately if:   You have sudden, severe abdominal pain.     You have problems swallowing.     You have a large amount of black, sticky bowel movements or blood in your bowel movements.     You have sudden trouble breathing.     You feel weak, lightheaded, or faint or your heart beats faster than normal for you.     Contact your healthcare provider if:   You have a fever and chills.      You have nausea or are vomiting.      Your abdomen is bloated or feels full and hard.     You have abdominal pain.   You have black, sticky bowel movements or blood in your bowel movements.  You have not had a bowel movement for 3 days after your procedure.  You have rash or hives.  You have questions or concerns about your procedure.    Activity:   Do not lift, strain, or run for 24 hours after your procedure.     Rest after your procedure. You have been given medicine to relax you. Do not drive or make important decisions until the day after your procedure. Return to your normal activity as directed.     Relieve gas and discomfort from bloating by lying on your right side with a heating pad on your abdomen. You may need to take short walks to help the gas move out. Eat small meals until bloating is relieved.  Follow up with your healthcare provider as directed: Write down your questions so you remember to ask them during your visits.     If you take a “blood thinner”, please review the specific instructions  from your endoscopist about when you should resume it. These can be found in the “Recommendation” and “Your Medication list” sections of this After Visit Summary.

## 2024-07-24 NOTE — ANESTHESIA POSTPROCEDURE EVALUATION
Post-Op Assessment Note    CV Status:  Stable  Pain Score: 0    Pain management: adequate       Mental Status:  Alert and awake   Hydration Status:  Euvolemic   PONV Controlled:  Controlled   Airway Patency:  Patent     Post Op Vitals Reviewed: Yes    No anethesia notable event occurred.    Staff: CRNA               BP   115/62   Temp      Pulse  76   Resp   18   SpO2   98

## 2024-07-24 NOTE — NURSING NOTE
Pregnancy test deferred per anesthesia due to remaining abstinent for years, per mother. Patient tolerated IV well, tourniquet applied loosely to right upper arm for IV insertion. Patient tolerated BP cuff well on left upper extremity, no adverse effects. Voided prior to procedure. Clothes remain on due to solar urticaria allergy. Anesthesia in to see patient and agreed to the pregnancy test being deferred. Patient educated throughout entire process. Mother accompanying her. Patient doing well. Emotional support provided as needed with positive effects noted.

## 2024-07-24 NOTE — TELEPHONE ENCOUNTER
Pt calling post procedure to discuss oxygen saturation concerns. Pt reports she still feel slightly off following the anesthesia event; her O2 saturation during the call was between 93% - 95%. Informed pt this is level is perfectly normal. Anesthesia affects every pt differently however wooziness should resolve within 24 hours. Pt is overall feeling well and will call back if any other questions arise.

## 2024-07-24 NOTE — ANESTHESIA PREPROCEDURE EVALUATION
Procedure:  EGD    Relevant Problems   ENDO   (+) Hypothyroidism due to Hashimoto's thyroiditis      GI/HEPATIC   (+) Esophageal dysphagia   (+) Gastroesophageal reflux disease without esophagitis      HEMATOLOGY   (+) Iron deficiency anemia        Physical Exam    Airway    Mallampati score: II  TM Distance: <3 FB  Neck ROM: full     Dental   No notable dental hx     Cardiovascular  Cardiovascular exam normal    Pulmonary  Pulmonary exam normal     Other Findings  post-pubertal.      Anesthesia Plan  ASA Score- 3     Anesthesia Type- IV sedation with anesthesia with ASA Monitors.         Additional Monitors:     Airway Plan:            Plan Factors-Exercise tolerance (METS): >4 METS.    Chart reviewed.   Existing labs reviewed.     Patient is not a current smoker. Patient not instructed to abstain from smoking on day of procedure. Patient did not smoke on day of surgery.            Induction- intravenous.    Postoperative Plan-         Informed Consent- Anesthetic plan and risks discussed with patient.  I personally reviewed this patient with the CRNA. Discussed and agreed on the Anesthesia Plan with the CRNA..

## 2024-07-25 NOTE — TELEPHONE ENCOUNTER
Patient calling back in today after having EGD yesterday.  Calling in today because there is a pencil sized red spot on her hand. She was concerned that she got a blood clot from the blood pressure cuff. She sent a picture on [employer]. I discussed to monitor the area and to let us know if there were any changes. I let her know that a blood clot typically presents much different and usually would be higher in her arm. She was instructed to call back with any changes, increase in redness, surrounding redness, fevers or anything else. Would you please review the picture and see if there is anything else you would like done.  On call provider contacted to review image. No additional advise needed at this time. Patient will call back with any changes or concerns.

## 2024-07-26 PROCEDURE — 88341 IMHCHEM/IMCYTCHM EA ADD ANTB: CPT | Performed by: PATHOLOGY

## 2024-07-26 PROCEDURE — 88342 IMHCHEM/IMCYTCHM 1ST ANTB: CPT | Performed by: PATHOLOGY

## 2024-07-26 PROCEDURE — 88305 TISSUE EXAM BY PATHOLOGIST: CPT | Performed by: PATHOLOGY

## 2024-07-27 NOTE — RESULT ENCOUNTER NOTE
The results were negative (unremarkable). Suggest colonoscopy and video swallow studies as next steps. This was communicated via [employer] ([employer]).

## 2024-08-02 DIAGNOSIS — D50.9 IRON DEFICIENCY ANEMIA, UNSPECIFIED IRON DEFICIENCY ANEMIA TYPE: ICD-10-CM

## 2024-08-02 DIAGNOSIS — R63.4 UNINTENTIONAL WEIGHT LOSS OF MORE THAN 10 POUNDS: Primary | ICD-10-CM

## 2024-08-05 ENCOUNTER — TELEPHONE (OUTPATIENT)
Dept: GASTROENTEROLOGY | Facility: CLINIC | Age: 49
End: 2024-08-05

## 2024-08-05 NOTE — TELEPHONE ENCOUNTER
Called pt and left message to call back to let her know to call central scheduling at [phone] for her barium swallow also so she can talk to the facility due to her allergy issue.

## 2024-08-06 ENCOUNTER — CLINICAL SUPPORT (OUTPATIENT)
Dept: NUTRITION | Facility: HOSPITAL | Age: 49
End: 2024-08-06
Payer: COMMERCIAL

## 2024-08-06 DIAGNOSIS — R63.4 WEIGHT LOSS, UNINTENTIONAL: Primary | ICD-10-CM

## 2024-08-06 PROCEDURE — 97803 MED NUTRITION INDIV SUBSEQ: CPT

## 2024-08-06 NOTE — PROGRESS NOTES
" Nutrition Assessment Form    Patient Name: Cate Peña    YOB: 1975    Sex: Female     Assessment Date: 8/6/2024  Start Time:  9:30 am Stop Time: 10:00 am Total Minutes: 30     Data:  Present at session: self   Parent/Patient Concerns/reason for visit: \"II was disappointed with the EGD results\"   Medical Dx/Reason for Referral: R79.89 (ICD-10-CM) - Low serum vitamin C & restrctive diet  Provider: Neftali Rose MD    Past Medical History:   Diagnosis Date    Costochondritis     Deviated septum 2023    left-with spur    Fibroid, uterine     Food poisoning 2023    Ovarian cyst     Seasonal allergies     Thyroid disease     Vitamin D deficiency        Current Outpatient Medications   Medication Sig Dispense Refill    albuterol (PROVENTIL HFA,VENTOLIN HFA) 90 mcg/act inhaler Inhale 2 puffs every 6 (six) hours as needed      ascorbic acid (VITAMIN C) 250 mg tablet Take 1,000 mg by mouth daily (Patient not taking: Reported on 4/5/2024)      EPINEPHrine (EPIPEN) 0.3 mg/0.3 mL SOAJ       ergocalciferol (VITAMIN D2) 50,000 units  (Patient not taking: Reported on 12/10/2021)      famotidine (PEPCID) 20 mg/2.5 mL oral suspension Take 2.5 mL (20 mg total) by mouth 2 (two) times a day 150 mL 1    loratadine (CLARITIN) 10 mg tablet Take 10 mg by mouth daily      sucralfate (CARAFATE) 1 g/10 mL suspension Take 10 mL (1 g total) by mouth 4 (four) times a day (with meals and at bedtime) 1200 mL 0    Synthroid 75 MCG tablet Take 1 tablet 6 days a week and no tab on Sunday. 26 tablet 0    Tirosint-SOL 75 MCG/ML SOLN 75 mcg liquid orally daily 30 mL 6     No current facility-administered medications for this visit.        Additional Meds/Supplements: N/A   Special Learning Needs/barriers to learning/any new barriers  N/A-pt thinks she may have autism   Height: 5'6 \" HC Readings from Last 5 Encounters:   No data found for HC      Weight: wts have went down to 88-90# before EGD and that has been stable    07/16/24 Today " "pt states she has been between 92-93#    06/04/24 Pt states she has been between 94# & 96#   Wt Readings from Last 10 Encounters:   07/24/24 39.9 kg (88 lb)   06/04/24 43.1 kg (95 lb)   04/05/24 40.8 kg (90 lb)   02/29/24 42.2 kg (93 lb)   12/07/23 41.7 kg (92 lb)   12/07/23 41.7 kg (92 lb)   10/13/23 43.5 kg (96 lb)   11/22/22 44.5 kg (98 lb)   11/16/22 44.9 kg (99 lb)   10/27/21 46.7 kg (103 lb)     Estimated body mass index is 14.2 kg/m² as calculated from the following:    Height as of 7/24/24: 5' 6\" (1.676 m).    Weight as of 7/24/24: 39.9 kg (88 lb).   Recent Weight Change:  [x]Yes     []No  Amount: +2-3# x 1 mo      Energy Needs: 5234-7280 kcal (35-40 g/kg)  63-84 g PRO (1.2-1.5 g/kg)  3047-7959 mL (1 mL/kcal)   Allergies   Allergen Reactions    Metronidazole Anxiety, Dizziness, Palpitations and Shortness Of Breath    Other Anaphylaxis, Hives, Shortness Of Breath, Itching, Photosensitivity, Nausea Only, Swelling, Anxiety, Palpitations, Rash, Other (See Comments), Tinnitus, GI Intolerance, Dizziness, Headache, Tachycardia, Confusion, Chest Pain, Fatigue, Irritability, Syncope, Drowsiness and Nasal Congestion     Fluorescent and halogen lights     *Solar Urticaria- Sun and    *Pressure Urticaria    *Pesticides    Strawberry (Diagnostic) - Food Allergy Dermatitis, Hives, Itching, Rash and Shortness Of Breath    Sulfites - Food Allergy Anaphylaxis, Hives and Shortness Of Breath    Adhesive  [Medical Tape] Hives    American Cockroach Other (See Comments)    Cat Hair Extract Other (See Comments)    Dog Epithelium (Canis Lupus Familiaris) Other (See Comments)    Peanut Oil - Food Allergy Other (See Comments)     \"Borderline allergy\" per pt    Penicillin G     Pollen Extract Other (See Comments)    Shellfish-Derived Products - Food Allergy Other (See Comments)    Shrimp Extract Allergy Skin Test - Food Allergy Other (See Comments)    or intolerances When younger-was allergic to strawberries; 20 years ago had hives " once when had frozen fish-thinks from sulfites;    Social History     Substance and Sexual Activity   Alcohol Use No       Social History     Tobacco Use   Smoking Status Never   Smokeless Tobacco Never       Who shops? patient   Who cooks/cooking methods/Eating out/take out habits   patient     Exercise: Non at this time.     Other: ie: Sleep habits/ stress level/ work habits household-lives with ?/ food security    Prior Nutritional Counseling? [x]Yes     []No  When: 07/16/24     Why: Follow up with this writer        Diet Hx: as per pt s recall of 08/06/24,   Breakfast: yogurt   Lunch: Milk (1) + tried muffin tops again         Dinner: Icecream pint       Snacks: Sometimes OF  1-3 spoonfuls of butter (100 kcal each)     Other Notes/ Initial Assessment:    The patient was identified by name and date of birth. Cate Peña was informed that this is a telemedicine visit and that the visit is being conducted through [employer]. She agrees to proceed..  My office door was closed. No one else was in the room.  She acknowledged consent and understanding of privacy and security of the video platform. The patient has agreed to participate and understands they can discontinue the visit at any time. Patient is aware this is a billable service.     08/06/24    Pt had lost wt since right before EGD.      Pt with an EGD 07/24/24, results from imaging and tissue biopsy unremarkable for h-pylori & EOE. Pt was hoping that the h-pylori was positive because she heard that getting that treated helps with her sun allergy.  A sliding hiatal hernia was found.   Pt with a barium swallow procedure 08/12/24 as long as pt can be protected from her sun allergy with the lights and she thought it was going to be cloudy that day but now thinks it is going to be marvin which affects her more.   Pt believes she will meet with a SLP who will do a swallow evaluation that day as well.      It is still taking a long time  for pt to eat,  longer than usual.   Pt has reintroduced muffin tops (220 kcal/each), 1x/d, trying to move to 2x/d. She is able to eat foods inside ice cream (example brownies & cookies).  Lookst at iron and potassium content of ice cream.  Aiming for 2 pints of icecream per day to incorporate more kcal/fat.     New foods pt has tried: a new icecream, lean cuisine mac & Cheese ( !!) This is big for pt, she stated that it took 30 min to eat 10 noodles and that there wasn't enough soft.     Opened carnation breakfast essentials ready to drink but didn't like the smell but also got the powdered version, plans to try boost, ensure, pediasure, maybe even ovaltine. Suggested trying to mis the carnation breakfast essentials. Pt is still aiming for very soft foods.  Discussed the benefits of incorporating other foods other than sugary foods. For higher calorie softer foods, I was thinking maybe some of those egg bowls you can microwave and add butter and cream to, mashed avocado (you can buy avocado chunks frozen, defrost and easier to mash), cream of wheat or farina made with heavy cream & add butter, add parmesan cheese or cream cheese to masked potatoes and mac & cheese.      Pt had to get to a work meeting, will contact this writer to schedule a follow up.       ___________________________________________________________________________________________________________________________________________  07/16/24  Pt ended up not going to the -hospital that day. Pt was told that she cannot get directly admitted to the ED by GI and it wouldn't be guaranteed that if she went to the ED that she would get him as a dr for the EGD.  So they decided to schedule the EGD for 07/24/24 @ [facility] with her GI-Dr arellano. Pt is unsure if she is going to be admitted after the procedure to inpatient status to try and get other tests/bloodwork done. Pt states she was told this depends on     Wt has been fluctuating between 90-92#.  Still  trying to drink milk, diet still about the same.  Has been trying to eat spoon fulls of butter with salt on top. Hasn't tried to let the michelle's muffin top in milk. Pt is too afraid to eat cheese, crackers, other foods for fear she will choke. Hasn't had orange juice in days, PO has been very poor. .     Pt states she has been waking up in the middle of the night with saliva pooling in her mouth and even having a hard time swallowing her claritin. Pt told GI about this, they have had a few conversations over the last few days.     Discussed some ideas for softer foods pt may be able to tolerate: Baby food-make sure stage 1-containers or pouches, Hersey breakfast essential powder, Refried beans beans or baked beans, Grits, farina, cream of whear-dry in cereal section, Bone broth, Heavy cream, Pudding-shelf stable, Fruit blend applesauce pouch, Spray cheese, Sour cream dip-already made or regular sour cream with ranch powder, Baby food melts-yogurt or the cereal ones.      Follow up scheduled tues 08/06/24   _______________________________________________________________________________________________________________________________________________________________________    07/02/24  Pt states her wt has gone from 94-95# to 92-93#.   PO intake has decreased over the last week. Pt notes that she has been having issues with swallowing, pt thought it was a bad batch of the brownie ice cream.  Pt contacted [employer] to see how they handle the product and she was told that they put the box of ice cream on the floor before putting it into the freezer, pt states she is not ok with that practice. Pt them went to look into getting the ice creams from [employer] instead but used a new reusable bag.  Pt stated the inside of the bag smelled and it made her ice cream smell so she didn't eat any of it.  Pt then had to try a new cayla & jerrys ice cream and did ok with it even though it had caramel in it until a few hours later when  "throat started to tighten up.     Pt contacted GI who are really pushing for pt to have an EGD.  Pt was suggested to take Claritin but even had an issue with swallowing the pill.  contacted GI who are really pushing for pt to have an EGD.  At this time d/t pt's strong concern about not being able to swallow, she is now agreeable to getting an EDG done but very concerned about sun allergy and environmental allergy.  Not only is pt agreeable to getting the EGD but would also like to stay overnight and have any and all labs/procedures done. Pt would like to be seen by a speech therapist r/t dysphagia. Pt has been in contact with her GI and is waiting to hear back from them about the proper course to take to get her in with consideration for her sun allergy.  States the dr is currently in surgery and she is waiting for them to call her back when he is done, she hopes to get within the next few days.    Pt has been drinking (3) 16oz bottles of water but pt is also concerned because it is taking her so much longer to drink even thin liquids.  This writer has had a conversation with pt's therapist since the last OP MNT appointment. Before being made aware that pt may be going to have procedure done, this writer was going to discuss with pt the real concern about her malnutrition and that being it has been over 4 months since the initial appointment, a conversation would need to be had about how to make additional moves give the real concern with malnutrition.  Will address this at next visit if appropriate.     Follow up scheduled 07/16/24  __________________________________________________________________________________________________________________________    06/04/24     Pt notes that her nausea and esophagitis has been at \"baseline\" only gets worse with Dove chocolates-which she has previously tolerated. Pt notes she is feeling a \"pins & needles\" pain all over her body.  Pt states while she knows that DM has been " "ruled out numerous times, she still feels like sometimes her BG spikes d/t how she feels. Pt knows eating some protein with a sugary food can help with slowing the spike in BG so trying to consume milk with cookies. Pt hasn't been using the microwave d/t often being more tired or nervous about the smell that the mac & cheese would emit and since she cannot open the windows d/t outside allergens.  Pt also concerned about being allergic to the light emitted from the microwave and concern about rust being in the microwave and that causing a reaction from her.  Pt note has the toaster--didn't as of the last appointment. States her family had tried it out and they tell her it doesn't have a smell.     Pt notes the stores she goes to have the safe foods that they have often been out of.    The last 2 weeks has been able ot eat 3 muffin tops per day. Prior to this week pt has been eating cheese -now up to 3 slices + more crackers-suggested pt trying to butter the crackers before eating but pt stated she has not liked this in the past.  Pt wasn't able to go out to the store Sunday d/t neighbor doing yard work earlier than usual and people were \"spraying\" outside. Pt has been getting chicken nuggets, fries, mc flurries most weekends. Still drinking orange. Suggested trying to get     Pt notes she has been weighing 94-96# over the last few weeks.  Pt wonders if this is r/t issues with her thyroid-last time checked was 12/2023. Pt knows she should be going to get blood work but not sure when she will be go out d/t the weather, outside allergens, people mowing. Suggested looking into if people can come to her house.    New foods: [employer] soumya cookies, a new Aris [employer] Janusz's ice cream flavor (chocolate therapy), has 2 other flavors but after pt opened them up the containers were only 3/4 of the way filled and this turned pt off.    On pt's list to try: more buttered pancakes, different ice cream flavors, increasing milk intake " "(currently @ 1c/d)    16oz bottles of water (3x/d), worried if has an additional cup of water (2c/d) + 1c of orange juice.    Pt has been applying to new jobs-2nd job.  Pt concerned about this d/t not being able to be outside, that it takes her about 2 hours to eat lunch. Pt states she does need this 2nd job to be able to afford her perferred foods     At this time pt feels like it is more likely that she is able to increase her preferred foods vs trying new foods as a means to get more calories.       Follow up scheduled  07/02/24  _______________________________________________________________________________________    05/07/24    Pt provided a signed copy of the KENROY for her therapist, scanned into system.    Pt feels like she is able to chew again as per normal since the procedure. Feels she is so used to eating on the non-procedure side.  Pt is working on trying to remember to eat on the other side.     Pt doesn't feel she has made much progress. States she has had some health setbacks which have affected her appetite/PO intake.  Pt attempted orange juice to get more vitamin C. States she had an OCD episode ardound the same time and also had more phleghm with the OJ.     Pt did try out one new food-[employer]mans soft chocolate chip cookies, felt she had an allergic reaction-used to tolerate them but this was a set back.  Pt also had an issue with running out of her \"safe\" hand soap so had a reaction to a new soap she has tried.  States she now has a burning throat irritation, feels it is causing her to have styes in her eye, also thinks she may have a UTI from it. Pt is fearful that trying a new soap will put her into anaphylaxis shock.     Pt feels these issues have been making her eat less d/t allergy.  Pt also notes her dear of choking while eating if she is trying to talk to someone.    Long term goal is to go with daughter to [employer] and eat there. Pt wants to be able to give pt more \"regular\" experiences.  " Suggested that pt try to get take out from there to try different items to see if     Follow up:  06/04/24    ___________________________________________________________________________________________________  04/11/24      Pt with GI f/u 04/05/24 (virtual).  Provider had recommended an EDG but pt is not feeling up to it at this time d/t solar urticaria and light sensitivity. Pt confirms this and doesn't know what she can do about that at this time but is considering to do it in the fall when outside allergens are reduced. Pt does state that she was looking to see how things went with her dental procedure r/t tolerance, support system.     This writer asked pt if dr had mentioned EOE, she stated they have but can't assess for that until she has an EGD.    Pt has tried tasty cake cookie bars again after a few years of not having them-states her eats started to close up and her throat became tight. Pt thinks her esophagitis was triggered. Pt followed up with 2 providers about this, one things it is GERD related, the other more allergy related. This has increased pt's anxiety with trying new foods.     Pt has reintroduced low-acid, no pulp orange juice into diet-pt with hx of low vitamin C levels. Pt is chewing better but still a little more hesitant to chew hard foods on that side-has been ok with crackers. Pt feels like she will feel more confident after the 1 mo post procedure suad.      Pt has tried pancakes with butter heated up in the microwave and tolerated them. Plans to introduce them more regularly. Pt states she is a little nervous about making other foods incase other foods will create certain smells that are noxious to her.    Pt also ate some fast food chicken nuggets and fries this past Sunday and ate a larger portion than she has had in the past.   It takes pt a long time to eat her pint of icecream and by the time that she is done it is like 2 pm and then isnt as hungry by the time dinner comes. Pt  says she plans to continue to eat the whole pint at once and divulged some new information. States that she only uses disposable plates/utensils/cups  for fear of a reaction to the chemicals in cleaning products and what the  will emit when opening it up after running it.  Suggested pt still eat from the carton but 1/2 in the AM & 1/2 in the PM but she would still rather eat it all at once.     Pt also bought felipe ring ding cookies as something to add in as another snack.     Overall, since last appointment pt has increased anxiety around the likely kasper of having an allergic reaction to new foods even though she knows increasing new foods will help increase foods that can give her more calories.  Suggested for now, pt work on increasing the portions of her safe foods-discussed what that may look like r/t the foods she is eating. Pt thinks this is a good plan of action for now.     Still working on getting appropriate KENROY for our department to be able to have pt fill out to give to her therapist.     Follow up: 04/25/24 @ 11 am  ___________________________________________________________________________________________  03/26/24  Pt with a tooth removed yesterday. States she had a cup of milk last night and did ok with that. This morning pt had some yogurt and did ok with it as well.  Prior to procedure pt did purchase some more yogurt, milk, ice cream.      Overall pt has been doing ok since the procedure, didn't go under general anesthesia but took benadryl instead and is still feeling a little woozy.     Pt states she may ask her mom to  [employer] mac & cheese, she has gotten some frozen mac & cheese but hasn't tried the microwave yet.  Pt notes lack of support as being a hindrance to things like trying different take out, getting her toaster oven/microwave working/.     Pt reports she is feeling hungry--which is weird for her since she doesn't usually feel hungry.  Pt is excited for the tooth to  "heal so she can chew on that side again (hasn't been able to try without pain in almost a year).     Since last appointment pt wasn't able to try the Julia mac & cheese but has retried cheeses and some other cheeses she hasn't tried in a bit (trying different brands), also different types of chocolate cookies.     Pt states she has an appointment with her therapist this week for the first time in 2 weeks. States she still isnt sure how helpful it will be for this writer to talk to her therapist also given how busy she says her therapist is.     Pt has been trying to monitor fluids-milk, water, to help prevent dehydration.   Pt plans to try small smears of cream cheese, smushing up american cheese. Pt has cake she wants to try-suggested in a day or so maybe to try letting it soak in milk to get softer. Discussed different ways to try and get electrolytes.     Pt hasn't weighted herself yet, states she was nervous to weigh herself prior to the procedure incase she had lost some wt and it made her stressed out. Pt isnt sure at this time when she plans to weigh herself again-this writer suggested maybe not until she is back to her \"regular\" eating habits  for about a week unless she starts to feel ill or notices her clothes feeling looser.      Next follow up scheduled: 24      _____________________________________________________________________________________________________________________  24    Pt stated she was a little late to the appointment, had a work meeting and stated she was eating her pint of ice cream and it takes her a long time to eat. Pt did not set up her microwave, states that she had to order some new clothes and the chemicals in the packaging gave her an allergic reaction and created a smell in her house-pt was also concerned maybe there was a mouse that  in the wall. So pt stated that those things plus more contributed to the barriers as to why she didn't meet her goals.     States " she had to order new clothes   Pt stated she was able to try the string cheese today but doesn't think she can try a new food today. States they are spraying outside and worried if she has a reaction she wouldn't be able to go outside to go to the hospital. Pt states even if it is a food she has had in the past-what if the company changed their ingredients or what if she develops a new allergy. Pt did have some Boars head and Sargento cheese-that she has had in the past-since she couldn't find her preferred cheeses. States she had to go out to a 3rd store and being out in the sun and just being outside more made her exhausted. Pt was also trying to check out different areas of the store and feels the different parts of the store had different lighting and that affected her.  She also felt was a bonus she picked up some more yogurt and hopes to try it in the next few days-even got raspberry instead of just vanilla.  Wheat thins*    Pt states she was ok with this writer talking with her therapist who she has been seeing over the last 20 years but pt states her therapist is so busy and testifies [place] so she may not be able to get back to this writer. Pt would prefer not to tell this writer the name of their therapist (out of ne to reach out to there therapist and pass on this writer's Therapist has helped pt with working with OCD r/t transporting foods from the store to help reduce the fear of food borne illness.       Discussed concerns about pt with adequate nutrition after having dental procedure with not as likely to be going out to get food and she will likely have limitations with food textures, she also may not be feeling well and appetite may decrease.  Pt feels it takes her longer to eat lunch and dinner.  Pt thinks that she may be more likely to increase her intake at breakfast-states she may be more likely to work more food into that meal.  When it came to talking about trying new foods and being  specific about it when she can try a food, pt stated she understood SMART goals but that she just never knows.  Pt was willing to set a goal-see goal. Once pt is healed will discuss trying new foods, the main goal until then is adequate kcal-pt agreeable with this.     F/U the day after her procedure-as pt request-03/26/24  ____________________________________________________________________________________________________________________________________________________________________________________________________________________________  02/29/24    Within a 6mo to a year time span pt developed COVID (2020) then had to move out of apartment and developed other health issues and this is when the majority of the wt loss occurred. Pt works from home, 2 jobs. Pt also states she had her 1st wisdom tooth removal and was put under anesthesia and that also affected wt loss. Pt is supposed to have another tooth pulled at the end of March.     Pt notes a rare allergy to light as well as pesticides and weed killers (long standing allergy but it has gotten worse). States this hinders access to foods. Pt states she is a single parent of an adult disabled child-also has restrictive diet d/t autism. States her daughter also has smell issues. States she cannot have food delivered d/t pesticide allergy. Pt also developed esophagitis, intermittent, takes a long time for her to eat. States d/t health issues she cannot be tested for cancer. Pt notes seeing a therapist for years but they do not have experience with her health issues. Pt also notes bouts of nausea, also has issues with food smells and tastes--they all affect appetite and she will lose wt and not be able to gain it back. Pt denies wanting to lose wt, pt would like to eat foods again. Pt states many of her preferred snack foods have been phased out (notes cheese puffs).  Pt nervous to try new foods or try foods she has previously cut out. Pt notes her oven makes a bad  "smell since it is new so she doesn't use it. Pt has a new microwave at home but hasn't taken it out yet, states she hasn't had time to get it out and use it, states family has a toaster oven and they are trying to \"burn it out for her\". Pt has had some blood work testing done for allergy-saw shellfish  Pt reports getting food poisoning over Thanksgiving and now is paranoid/concerned about this happening again.  Pt notes fear of trying supplements d/t ingredients.  Pt notes she would rather try cereal as an additional source of kcal va oral supplements. Pt states an issue is being concerned about what the ingredients of the food are but pt couldn't state what ingredients are actually ones she would avoid other than MSG and some preservatives. Pt states he was willing to try eating yogurt-she gets it for her daughter but forgets to eat it, same with string cheese.    As per meeting and diet hx ps is observed to meet the criteria for ARFID. Pt with significant wt loss     Labs of note: 12/27/23 D <14, A1C 5.7, TSH 8.41, elevated lipid panel, elevated TPO, very low vitamin C    Pt has also cut foods out d/t feeling like they make her feel like she has marbles in her mouth and then her throat will start to constrict.     Pt would like to be able to eat chips, french fries, a lot of frozen foods and italian foods.     Pt feels she would  As per conversation, lack of available way for pt to consume foods. Pt has microwave and just has to wash and put in the turntable. Most of the appointment today was gather information from pt, build rapport, discuss possible routes to go moving forward.  See goals.     Follow up scheduled for March 11 @ 1:30 pm-will work on pt consuming a new food during this appointment.        Nutrition Diagnosis:   Disordered eating pattern  related to multiple health issues, allergies as evidenced by wt loss, inadequate i       Any change or new dx since previous visit:     Nutrition Diagnosis: "   Underweight  related to Inadequate energy intake as  evidenced by BMI <18.5      Medical Nutrition Therapy Intervention:  [x]Individualized Meal Plan  3363-9590 kcal (35-40 g/kg)  63-84 g PRO (1.2-1.5 g/kg)  2516-0660 mL (1 mL/kcal) []Understanding Lab Values   []Basic Pathophysiology of Disease []Food/Medication Interactions   []Food Diary [x]Exercise: Not necessarily encouraged for now until wt stabilizes   [x]Lifestyle/Behavior Modification Techniques:  incorporating new foods, increasing intake at meals, incorporating snacks.  []Medication, Mechanism of Action   []Label Reading: CHO/ Na/ Fat/ other_________ []Self Blood Glucose Monitoring   [x]Weight/BMI Goals: gain/lose/maintain: Avoid wt loss []Other -           Comprehension: []Excellent  []Very Good  [x]Good  []Fair   []Poor    Receptivity: []Excellent  []Very Good  [x]Good  []Fair   []Poor    Expected Compliance: []Excellent  []Very Good  [x]Good  []Fair   []Poor        Goals: 08/06/24: (no goals set 07/16)  Pt is going to increase her milk by 1/2c to 1c/day.   2.       3.     Goals: 07/02/24:  Pt is going to increase her milk by 1/2c to 1c/day.   2.       3.       Goals: 06/04/24:  Pt is going to increase her milk by 1/2c to 1c/day.   2.       3.       Goals: 05/07/24:  Pt didn't feel she could set goals at this time.    2.       3.       Goals: 04/11/24:  Pt will aim to eat a 1/4 more of her typical serving of foods at each meal   2.       3.       Goals (initial) 03/26/24:  Starting day 2/3 post procedure to consume 2 slices of cheese per day   2.    Pt will ask her mom to bring her [employer] mac & cheese    3.       Goals (initial) 03/11/24:  Within the next week try either Julia mac & cheese or another type of mac & cheese   2.       3.     Goals (initial) 02/29/24:  Weekend of march 9th/10 make pancakes in the microwave   2.    Set an alarm in her phone to eat a string cheese in the evening with her daughter.    3.     Labs:  CMP  Lab Results  "  Component Value Date    K 4.2 12/27/2023     12/27/2023    CO2 27 12/27/2023    BUN 7 12/27/2023    CREATININE 0.78 12/27/2023    CALCIUM 9.6 12/27/2023    AST 11 08/22/2023    ALT 6 08/22/2023    ALKPHOS 36 08/22/2023    EGFR 94 12/27/2023       BMP  Lab Results   Component Value Date    CALCIUM 9.6 12/27/2023    K 4.2 12/27/2023    CO2 27 12/27/2023     12/27/2023    BUN 7 12/27/2023    CREATININE 0.78 12/27/2023       Lipids  No results found for: \"CHOL\"  No results found for: \"HDL\"  No results found for: \"LDLCALC\"  No results found for: \"TRIG\"  No results found for: \"CHOLHDL\"    Hemoglobin A1C  Lab Results   Component Value Date    HGBA1C 5.7 (H) 12/27/2023       Fasting Glucose  No results found for: \"GLUF\"    Insulin     Thyroid  Lab Results   Component Value Date    TSH 8.41 (H) 12/27/2023       Hepatic Function Panel  Lab Results   Component Value Date    ALT 6 08/22/2023    AST 11 08/22/2023    ALKPHOS 36 08/22/2023       Celiac Disease Antibody Panel  No results found for: \"ENDOMYSIAL IGA\", \"GLIADIN IGA\", \"GLIADIN IGG\", \"IGA\", \"TISSUE TRANSGLUT AB\", \"TTG IGA\"   Iron  Lab Results   Component Value Date    IRON 71 05/01/2021    TIBC 394 05/01/2021    FERRITIN 18 05/01/2021            Tiffany Mijares RD, LDN  [facility] CLINICAL NUTRITION SERVICES  [facility] [address] [place] [phone]    "

## 2024-08-07 ENCOUNTER — NURSE TRIAGE (OUTPATIENT)
Age: 49
End: 2024-08-07

## 2024-08-07 NOTE — TELEPHONE ENCOUNTER
"  Pt. Calling with worsening swallowing , scheduled barium swallow study for 8/12, asked several questions this nurse unable to answer, gave number for fluoroscopy center to call and discuss concerns with them, pt. Called Dr. Coreas office and has no appointments until January, pt. Concerned because her swallowing is worse and losing more weight, pt. Is worried she can't see an ENT sooner, pt. Is asking for any other recommendations aside from Dr. Coreas, pt. Is trying to get a sooner appointment, pt. Is asking if there is another provider she can call that Dr. Owusu would recommend, pt. Is doing 1 test at a time because of trying to coordinate to accommodate her light allergy, she will do the barium swallow first and then do the CT scan and then the US, pt. Just wanted to make  aware,  pt. Did try swallowing OJ yesterday and almost choked, pt. Is able to tolerate liquids better today but yesterday had  a very difficult time, please advise       Reason for Disposition   Nursing judgment    Answer Assessment - Initial Assessment Questions  1. REASON FOR CALL or QUESTION: \"What is your reason for calling today?\" or \"How can I best help you?\" or \"What question do you have that I can help answer?\"          Pt. Is calling with additional information regarding f/u with ENT, pt. Also having worsening swallowing    Protocols used: Information Only Call - No Triage-ADULT-[place]    "

## 2024-08-12 ENCOUNTER — HOSPITAL ENCOUNTER (OUTPATIENT)
Dept: RADIOLOGY | Facility: HOSPITAL | Age: 49
Discharge: HOME | End: 2024-08-12
Attending: INTERNAL MEDICINE
Payer: COMMERCIAL

## 2024-08-12 DIAGNOSIS — R13.12 OROPHARYNGEAL DYSPHAGIA: ICD-10-CM

## 2024-08-12 PROCEDURE — 74230 X-RAY XM SWLNG FUNCJ C+: CPT

## 2024-08-12 PROCEDURE — 92611 MOTION FLUOROSCOPY/SWALLOW: CPT

## 2024-08-12 NOTE — PROCEDURES
Video Swallow Study      Patient Name: Cate Peña  Today's Date: 8/12/2024        Past Medical History  Past Medical History:   Diagnosis Date    Costochondritis     Deviated septum 2023    left-with spur    Fibroid, uterine     Food poisoning 2023    Ovarian cyst     Seasonal allergies     Thyroid disease     Vitamin D deficiency         Past Surgical History  Past Surgical History:   Procedure Laterality Date    WISDOM TOOTH EXTRACTION       Modified (Video) Barium Swallow Study    Summary:  Images are on PACS for review.     Study was somewhat limited secondary to pt's comfort level w/ taking larger bites. Declined a whole and half pill but was agreeable to 1/4th.     Oral stage: WNL. Pt took extremely small bites and chewed all solids to a puree-like consistency. Transferred piece-meal. Bolus control and formation were WNL.     Pharyngeal stage: WNL for velar elevation, anterior hyoid excursion, laryngeal elevation, pharyngeal constriction, epiglottic inversion, tongue base retraction, and passage through UES. Bolus size was limited however, 2* pt's comfort level. Uncertain if this may have affected study results. No penetration or aspiration. 1/4 pill in pudding passed WNL though the pt appeared anxious when swallowing and tapped her throat repeatedly. Pt asked if she aspirated.     Per gross esophageal screen:  Distal stasis, delayed clearance, and retrograde flow noted following consecutive sips of thin liquid in series 3. Mild distal stasis w/ pudding.    Recommendations:  Diet: As tolerated by pt. Pt chewed all food to a puree like consistency today.   Consider supplements if not allergic.  Liquids: thin  Meds: able to take 1/4th pill today. Unable to assess w/ larger size.  Frequent/thorough oral care  Upright position  F/u ST tx: not a this time  Reflux Precautions  Consider consult with: refer back to GI.   Results reviewed with: pt, mother. I explained the results to  "the pt throughout the study. Both were concerned about aspiration. Educated there was none.   If a dedicated assessment of the esophagus is desired:  Consider esophagram/routine barium swallow/UGI w/ barium tablet administration.      Pt is a 49yof referred by GI for MBS. Pt reports difficulty swallowing, mostly solids and stated she can not swallow pills. Feels as if she is choking. She characterizes current issue as moderate/severe dysphagia and is concerned that PO is going into her airway. The pt pointed to her suprasternal notch and/or neck several times during the study. She was shown there was nothing present. Question if this may be a referred sensation from the esophagus or if pt may have anxiety about swallowing.  I explained that referred sensations are common. She reported it felt \"tight\". Pt was encouraged to take larger bites/spoonfuls (at her comfort level) throughout the study 2* all bites and tsps were extremely small. I explained to the pt that this may somewhat limit the study. Pt stated she should have brought chicken nuggets or pizza. I explained that if she chews the food for several minutes prior to swallowing,  it would likely have been a similar texture (puree-like).     H&P/pertinent provider notes: (PMH noted above)  Per GI note 7/3/24:  Spoke with pt over the phone after nurse navigator informed me overnight while on call that pt was concerned with worsening symptoms of liquid pooling in her mouth, which woke her up from sleep. Pt has been having heartburn and reflux symptoms worsening over the past few weeks. She states these symptoms have worsened before and at their peak they result in dysphagia with some discomfort in between her clavicles. She has still been unable to gain weight with last reported weight of 95 lbs. She has been eating ice cream and yogurt daily to increase calories, but has also been tolerating cheese, crackers and pizza. She is not taking any medications for GERD. " Discussed GERD precautions with pt  including avoidance of trigger foods (potential foods include coffee, caffeine, chocolate, tomato-based products, spicy foods, fatty foods).      She has been following with Dr. Owusu regarding these symptoms who last saw her via telemedicine visit on 4/5. At that time plan was for EGD once she felt comfortable undergoing procedure and to record food diary. She suffers from solar urticaria and has significant anxiety surrounding leaving the house and undergoing procedures.     The following plan was outlined for pt and she is agreeable. As above, I reviewed GERD lifestyle changes and recommended she continue with soft foods and liquids (eliminating caffeine and carbonated beverages though) and avoid crackers, peanut butter, meat that could worsen dysphagia. Precautions for when to present to ED outlined. I prescribed famotidine 20 mg po bid in liquid form as well as sucralfate to take 4x daily or prn depending on effectiveness again liquid form per pt's request given difficulty ingesting pills. I will place order for EGD to hopefully arrange in the next few weeks pending Dr. Mcclain's availability. Regardless, she will keep upcoming appointment with him on 7/16. She did request that in order to feel as comfortable with procedure and avoid complications associated with her urticaria she asked if a face shield would be available to protect her skin. She also mentioned that avoiding lights (fluorescent and halothane in particular) as much as possible would be appreciated. She asked if she would be able to be admitted following the procedure in order to undergo additional testing previously ordered including barium swallow and abd US. I informed her that these requests may be difficulty to arrange, but we will try our best. Will discuss further with Dr. Owusu and B clerical pool.   Azam Dumont  GI Fellow     BMI is 14.20.     Special Studies:  EGD 7/24/24:  MPRESSION:  3 cm  type I hiatal hernia  The esophagus and duodenum appeared normal.  Performed forceps biopsies in the upper third of the esophagus, lower third of the esophagus, stomach and duodenum  RECOMMENDATION:  Await pathology results   Follow-up in our office for further evaluation and management.  If you don't have an appointment scheduled, please call [phone] to schedule your appointment.    Follow-up for outpatient video swallow study       Previous MBS:  none    Food allergies:  Peanut oil, shellfish and shrimp derived products, strawberry, sulfites in foods.    Current diet: Mostly liquids and things like pudding.    Premorbid diet: Reported regular a few months ago   Dentition:  natural   O2 requirement:  None/RA   Oral mech:  Grossly WNL   Vocal quality/speech:  WNL, no duysphonia   Cognitive status:  Alert     Consistencies administered: Puree, nutrigrain bar, cookie (very small/minuscule bites of solids) agreeable to only 1/4th barium pill w/ pudding, thin, nectar. (Pt provided her own food 2* concern for allergic reactions).     Pt was viewed seated laterally at 90 degrees.  Pt was viewed standing AP

## 2024-08-14 DIAGNOSIS — J02.9 SORE THROAT: ICD-10-CM

## 2024-08-14 DIAGNOSIS — R13.12 OROPHARYNGEAL DYSPHAGIA: Primary | ICD-10-CM

## 2024-08-28 ENCOUNTER — CLINICAL SUPPORT (OUTPATIENT)
Dept: NUTRITION | Facility: HOSPITAL | Age: 49
End: 2024-08-28
Payer: COMMERCIAL

## 2024-08-28 DIAGNOSIS — R63.4 WEIGHT LOSS, UNINTENTIONAL: Primary | ICD-10-CM

## 2024-08-28 PROCEDURE — 97803 MED NUTRITION INDIV SUBSEQ: CPT

## 2024-08-28 NOTE — PROGRESS NOTES
" Nutrition Assessment Form    Patient Name: Cate Peña    YOB: 1975    Sex: Female     Assessment Date: 8/28/2024  Start Time:  9:30 am Stop Time: 10:00 am Total Minutes: 30     Data:  Present at session: self   Parent/Patient Concerns/reason for visit: \"II was disappointed with the EGD results\"   Medical Dx/Reason for Referral: R79.89 (ICD-10-CM) - Low serum vitamin C & restrctive diet  Provider: Neftali Rose MD    Past Medical History:   Diagnosis Date    Costochondritis     Deviated septum 2023    left-with spur    Fibroid, uterine     Food poisoning 2023    Hiatal hernia 07/2024    sliding    Ovarian cyst     Seasonal allergies     Thyroid disease     Vitamin D deficiency        Current Outpatient Medications   Medication Sig Dispense Refill    albuterol (PROVENTIL HFA,VENTOLIN HFA) 90 mcg/act inhaler Inhale 2 puffs every 6 (six) hours as needed      ascorbic acid (VITAMIN C) 250 mg tablet Take 1,000 mg by mouth daily (Patient not taking: Reported on 4/5/2024)      EPINEPHrine (EPIPEN) 0.3 mg/0.3 mL SOAJ Inject 0.3 mL (0.3 mg total) into a muscle if needed for anaphylaxis 0.6 mL 11    ergocalciferol (VITAMIN D2) 50,000 units  (Patient not taking: Reported on 12/10/2021)      famotidine (PEPCID) 20 mg/2.5 mL oral suspension Take 2.5 mL (20 mg total) by mouth 2 (two) times a day 150 mL 1    loratadine 5 mg/5 mL syrup Take 10 mg by mouth in the morning      sucralfate (CARAFATE) 1 g/10 mL suspension Take 10 mL (1 g total) by mouth 4 (four) times a day (with meals and at bedtime) 1200 mL 0    Synthroid 75 MCG tablet Take 1 tablet 6 days a week and no tab on Sunday. 26 tablet 0    Tirosint-SOL 75 MCG/ML SOLN 75 mcg liquid orally daily 30 mL 6     No current facility-administered medications for this visit.        Additional Meds/Supplements: N/A   Special Learning Needs/barriers to learning/any new barriers  N/A-pt thinks she may have autism   Height: 5'6 \" HC Readings from Last 5 Encounters:   No " "data found for HC      Weight:     08/06/24 wts have went down to 88-90# before EGD and that has been stable    07/16/24 Today pt states she has been between 92-93#    06/04/24 Pt states she has been between 94# & 96#   Wt Readings from Last 10 Encounters:   08/21/24 39.9 kg (88 lb)   07/24/24 39.9 kg (88 lb)   06/04/24 43.1 kg (95 lb)   04/05/24 40.8 kg (90 lb)   02/29/24 42.2 kg (93 lb)   12/07/23 41.7 kg (92 lb)   12/07/23 41.7 kg (92 lb)   10/13/23 43.5 kg (96 lb)   11/22/22 44.5 kg (98 lb)   11/16/22 44.9 kg (99 lb)     Estimated body mass index is 14.2 kg/m² as calculated from the following:    Height as of 8/21/24: 5' 6\" (1.676 m).    Weight as of 8/21/24: 39.9 kg (88 lb).   Recent Weight Change:  [x]Yes     []No  Amount: +2-3# x 1 mo      Energy Needs: 5258-7542 kcal (35-40 g/kg)  63-84 g PRO (1.2-1.5 g/kg)  7421-2238 mL (1 mL/kcal)   Allergies   Allergen Reactions    Metronidazole Anxiety, Dizziness, Palpitations and Shortness Of Breath    Other Anaphylaxis, Hives, Shortness Of Breath, Itching, Photosensitivity, Nausea Only, Swelling, Anxiety, Palpitations, Rash, Other (See Comments), Tinnitus, GI Intolerance, Dizziness, Headache, Tachycardia, Confusion, Chest Pain, Fatigue, Irritability, Syncope, Drowsiness and Nasal Congestion     Fluorescent and halogen lights     *Solar Urticaria- Sun and    *Pressure Urticaria    *Pesticides    Strawberry (Diagnostic) - Food Allergy Dermatitis, Hives, Itching, Rash and Shortness Of Breath    Sulfites - Food Allergy Anaphylaxis, Hives and Shortness Of Breath    Adhesive  [Medical Tape] Hives    American Cockroach Other (See Comments)    Cat Hair Extract Other (See Comments)    Dog Epithelium (Canis Lupus Familiaris) Other (See Comments)    Latex Hives    Penicillin G     Pollen Extract Other (See Comments)    Shellfish-Derived Products - Food Allergy Other (See Comments)    Shrimp Extract Allergy Skin Test - Food Allergy Other (See Comments)    or intolerances When " younger-was allergic to strawberries; 20 years ago had hives once when had frozen fish-thinks from sulfites;    Social History     Substance and Sexual Activity   Alcohol Use No       Social History     Tobacco Use   Smoking Status Never   Smokeless Tobacco Never       Who shops? patient   Who cooks/cooking methods/Eating out/take out habits   patient     Exercise: Non at this time.     Other: ie: Sleep habits/ stress level/ work habits household-lives with ?/ food security    Prior Nutritional Counseling? [x]Yes     []No  When: 08/06/24     Why: Follow up with this writer        Diet Hx: as per pt s recall of 08/06/24,   Breakfast: Yogurt (150)   Lunch: Icecream pint         Dinner: Pancakes or other snacks       Snacks: Sometimes OF  1-3 spoonfuls of butter (100 kcal each)     Other Notes/ Initial Assessment:    The patient was identified by name and date of birth. Cate Peña was informed that this is a telemedicine visit and that the visit is being conducted through [facility] [employer]. She agrees to proceed..  My office door was closed. No one else was in the room.  She acknowledged consent and understanding of privacy and security of the video platform. The patient has agreed to participate and understands they can discontinue the visit at any time. Patient is aware this is a billable service.     08/28/24    Pt had swallow study done 08/12/24.  Pt brought a lot of her own foods that she felt safe with. Tried water, pudding, blueberry bars, soft chcolate chip cookie.  As per note and pt, they werent able to do a thorough assessment d/t lack of sufficiently different textures and with pt chewing so much. Pt was referred to ENT & Otolaryngology, an upper barium swallow. Pt is concerned about abdominal cancer and that may be causing the wt loss-has plans to an abdominal ultrasound and CT scan (would like to get this done within a month).  Pt still running into issues with getting tests/procedures done d/t sun  allergy.  Pt is going to look into maybe getting into see a SLP for swallowing therapy.         Pt was told that her sliding hiatal hernia isnt affecting her swallowing as much as pt thinks it is but pt thinks it does play a larger role.     Since last appointment pt as tried ishmael rodriguez again and had a few fries and 2 chicken nuggets and it took her a long time to eat them. After the 08/12/24 procedure she had to go to [employer] for lunch and had some pancakes with cereal and butter plus shome hashbrown casserole. Pt feels she did well with that but it again, took a long time to eat them. Pt notes this was a very stressful situation. Pt went to [employer] again this weekend for more pancakes with butter and maple syrup.    Pt states she is concerned about the freezer temperatures at her local [employer] store (she asked the store) r/t getting food poisoning from the ice cream she gets.  Pt also has on hand some frozen fettuccini, ravioli, macaroni & cheese, Chef young    Pt has purchased vanilla and chocolate carnation breakfast essentials and does well with them. Disucssed the importance of increasing kcal, benefits of getting on board with 3 scheduled mealtimes to be able to better assess intake and plan for intake.     2 carnation instant breakfast with 8oz milk + 1.5 servings of of powder  1-2 pints of icecream  Double portion of pancakes with butter or mac & cheese/frozen ravioli    Goals:   AM: carnation + buttered pancakes with butter & syrup  Lunch: Pint of icecream  Dinner: Either repeat breakfast, repeat lunch, or try one of the frozen pasta meals       Follow up scheduled for 09/11/24    ___________________________________________________________________________________________________________________________  08/06/24    Pt had lost wt since right before EGD.      Pt with an EGD 07/24/24, results from imaging and tissue biopsy unremarkable for h-pylori & EOE. Pt was hoping that the h-pylori was  positive because she heard that getting that treated helps with her sun allergy.  A sliding hiatal hernia was found.   Pt with a barium swallow procedure 08/12/24 as long as pt can be protected from her sun allergy with the lights and she thought it was going to be cloudy that day but now thinks it is going to be marvin which affects her more.   Pt believes she will meet with a SLP who will do a swallow evaluation that day as well.      It is still taking a long time  for pt to eat, longer than usual.   Pt has reintroduced muffin tops (220 kcal/each), 1x/d, trying to move to 2x/d. She is able to eat foods inside ice cream (example brownies & cookies).  Lookst at iron and potassium content of ice cream.  Aiming for 2 pints of icecream per day to incorporate more kcal/fat.     New foods pt has tried: a new icecream, lean cuisine mac & Cheese ( !!) This is big for pt, she stated that it took 30 min to eat 10 noodles and that there wasn't enough soft.     Opened carnation breakfast essentials ready to drink but didn't like the smell but also got the powdered version, plans to try boost, ensure, pediasure, maybe even ovaltine. Suggested trying to mis the carnation breakfast essentials. Pt is still aiming for very soft foods.  Discussed the benefits of incorporating other foods other than sugary foods. For higher calorie softer foods, I was thinking maybe some of those egg bowls you can microwave and add butter and cream to, mashed avocado (you can buy avocado chunks frozen, defrost and easier to mash), cream of wheat or farina made with heavy cream & add butter, add parmesan cheese or cream cheese to masked potatoes and mac & cheese.      Pt had to get to a work meeting, will contact this writer to schedule a follow up.       ___________________________________________________________________________________________________________________________________________  07/16/24  Pt ended up not going to the hospitals that  day. Pt was told that she cannot get directly admitted to the ED by GI and it wouldn't be guaranteed that if she went to the ED that she would get him as a dr for the EGD.  So they decided to schedule the EGD for 07/24/24 @ [facility] with her GI-Dr arellano. Pt is unsure if she is going to be admitted after the procedure to inpatient status to try and get other tests/bloodwork done. Pt states she was told this depends on     Wt has been fluctuating between 90-92#.  Still trying to drink milk, diet still about the same.  Has been trying to eat spoon fulls of butter with salt on top. Hasn't tried to let the michelle's muffin top in milk. Pt is too afraid to eat cheese, crackers, other foods for fear she will choke. Hasn't had orange juice in days, PO has been very poor. .     Pt states she has been waking up in the middle of the night with saliva pooling in her mouth and even having a hard time swallowing her claritin. Pt told GI about this, they have had a few conversations over the last few days.     Discussed some ideas for softer foods pt may be able to tolerate: Baby food-make sure stage 1-containers or pouches, [place] breakfast essential powder, Refried beans beans or baked beans, Grits, farina, cream of whear-dry in cereal section, Bone broth, Heavy cream, Pudding-shelf stable, Fruit blend applesauce pouch, Spray cheese, Sour cream dip-already made or regular sour cream with ranch powder, Baby food melts-yogurt or the cereal ones.      Follow up scheduled tues 08/06/24   _______________________________________________________________________________________________________________________________________________________________________    07/02/24  Pt states her wt has gone from 94-95# to 92-93#.   PO intake has decreased over the last week. Pt notes that she has been having issues with swallowing, pt thought it was a bad batch of the brownie ice cream.  Pt contacted [employer] to see how they handle the  product and she was told that they put the box of ice cream on the floor before putting it into the freezer, pt states she is not ok with that practice. Pt them went to look into getting the ice creams from [employer] instead but used a new reusable bag.  Pt stated the inside of the bag smelled and it made her ice cream smell so she didn't eat any of it.  Pt then had to try a new cayla & jerrys ice cream and did ok with it even though it had caramel in it until a few hours later when throat started to tighten up.     Pt contacted GI who are really pushing for pt to have an EGD.  Pt was suggested to take Claritin but even had an issue with swallowing the pill.  contacted GI who are really pushing for pt to have an EGD.  At this time d/t pt's strong concern about not being able to swallow, she is now agreeable to getting an EDG done but very concerned about sun allergy and environmental allergy.  Not only is pt agreeable to getting the EGD but would also like to stay overnight and have any and all labs/procedures done. Pt would like to be seen by a speech therapist r/t dysphagia. Pt has been in contact with her GI and is waiting to hear back from them about the proper course to take to get her in with consideration for her sun allergy.  States the dr is currently in surgery and she is waiting for them to call her back when he is done, she hopes to get within the next few days.    Pt has been drinking (3) 16oz bottles of water but pt is also concerned because it is taking her so much longer to drink even thin liquids.  This writer has had a conversation with pt's therapist since the last OP MNT appointment. Before being made aware that pt may be going to have procedure done, this writer was going to discuss with pt the real concern about her malnutrition and that being it has been over 4 months since the initial appointment, a conversation would need to be had about how to make additional moves give the real concern with  "malnutrition.  Will address this at next visit if appropriate.     Follow up scheduled 07/16/24  __________________________________________________________________________________________________________________________    06/04/24     Pt notes that her nausea and esophagitis has been at \"baseline\" only gets worse with Dove chocolates-which she has previously tolerated. Pt notes she is feeling a \"pins & needles\" pain all over her body.  Pt states while she knows that DM has been ruled out numerous times, she still feels like sometimes her BG spikes d/t how she feels. Pt knows eating some protein with a sugary food can help with slowing the spike in BG so trying to consume milk with cookies. Pt hasn't been using the microwave d/t often being more tired or nervous about the smell that the mac & cheese would emit and since she cannot open the windows d/t outside allergens.  Pt also concerned about being allergic to the light emitted from the microwave and concern about rust being in the microwave and that causing a reaction from her.  Pt note has the toaster--didn't as of the last appointment. States her family had tried it out and they tell her it doesn't have a smell.     Pt notes the stores she goes to have the safe foods that they have often been out of.    The last 2 weeks has been able ot eat 3 muffin tops per day. Prior to this week pt has been eating cheese -now up to 3 slices + more crackers-suggested pt trying to butter the crackers before eating but pt stated she has not liked this in the past.  Pt wasn't able to go out to the store Sunday d/t neighbor doing yard work earlier than usual and people were \"spraying\" outside. Pt has been getting chicken nuggets, fries, mc flurries most weekends. Still drinking orange. Suggested trying to get     Pt notes she has been weighing 94-96# over the last few weeks.  Pt wonders if this is r/t issues with her thyroid-last time checked was 12/2023. Pt knows she should be " "going to get blood work but not sure when she will be go out d/t the weather, outside allergens, people mowing. Suggested looking into if people can come to her house.    New foods: Bruno dooley cookies, a new Ar[employer] Jan[employer] ice cream flavor (chocolate therapy), has 2 other flavors but after pt opened them up the containers were only 3/4 of the way filled and this turned pt off.    On pt's list to try: more buttered pancakes, different ice cream flavors, increasing milk intake (currently @ 1c/d)    16oz bottles of water (3x/d), worried if has an additional cup of water (2c/d) + 1c of orange juice.    Pt has been applying to new jobs-2nd job.  Pt concerned about this d/t not being able to be outside, that it takes her about 2 hours to eat lunch. Pt states she does need this 2nd job to be able to afford her perferred foods     At this time pt feels like it is more likely that she is able to increase her preferred foods vs trying new foods as a means to get more calories.       Follow up scheduled  07/02/24  _______________________________________________________________________________________    05/07/24    Pt provided a signed copy of the KENROY for her therapist, scanned into system.    Pt feels like she is able to chew again as per normal since the procedure. Feels she is so used to eating on the non-procedure side.  Pt is working on trying to remember to eat on the other side.     Pt doesn't feel she has made much progress. States she has had some health setbacks which have affected her appetite/PO intake.  Pt attempted orange juice to get more vitamin C. States she had an OCD episode ardound the same time and also had more phleghm with the OJ.     Pt did try out one new food-[employer] soft chocolate chip cookies, felt she had an allergic reaction-used to tolerate them but this was a set back.  Pt also had an issue with running out of her \"safe\" hand soap so had a reaction to a new soap she has tried.  States she now " "has a burning throat irritation, feels it is causing her to have styes in her eye, also thinks she may have a UTI from it. Pt is fearful that trying a new soap will put her into anaphylaxis shock.     Pt feels these issues have been making her eat less d/t allergy.  Pt also notes her dear of choking while eating if she is trying to talk to someone.    Long term goal is to go with daughter to [employer] and eat there. Pt wants to be able to give pt more \"regular\" experiences.  Suggested that pt try to get take out from there to try different items to see if     Follow up:  06/04/24    ___________________________________________________________________________________________________  04/11/24      Pt with GI f/u 04/05/24 (virtual).  Provider had recommended an EDG but pt is not feeling up to it at this time d/t solar urticaria and light sensitivity. Pt confirms this and doesn't know what she can do about that at this time but is considering to do it in the fall when outside allergens are reduced. Pt does state that she was looking to see how things went with her dental procedure r/t tolerance, support system.     This writer asked pt if  had mentioned EOE, she stated they have but can't assess for that until she has an EGD.    Pt has tried tasty cake cookie bars again after a few years of not having them-states her eats started to close up and her throat became tight. Pt thinks her esophagitis was triggered. Pt followed up with 2 providers about this, one things it is GERD related, the other more allergy related. This has increased pt's anxiety with trying new foods.     Pt has reintroduced low-acid, no pulp orange juice into diet-pt with hx of low vitamin C levels. Pt is chewing better but still a little more hesitant to chew hard foods on that side-has been ok with crackers. Pt feels like she will feel more confident after the 1 mo post procedure suad.      Pt has tried pancakes with butter heated up in the " microwave and tolerated them. Plans to introduce them more regularly. Pt states she is a little nervous about making other foods incase other foods will create certain smells that are noxious to her.    Pt also ate some fast food chicken nuggets and fries this past Sunday and ate a larger portion than she has had in the past.   It takes pt a long time to eat her pint of icecream and by the time that she is done it is like 2 pm and then isnt as hungry by the time dinner comes. Pt says she plans to continue to eat the whole pint at once and divulged some new information. States that she only uses disposable plates/utensils/cups  for fear of a reaction to the chemicals in cleaning products and what the  will emit when opening it up after running it.  Suggested pt still eat from the carton but 1/2 in the AM & 1/2 in the PM but she would still rather eat it all at once.     Pt also bought felipe ring ding cookies as something to add in as another snack.     Overall, since last appointment pt has increased anxiety around the likely kasper of having an allergic reaction to new foods even though she knows increasing new foods will help increase foods that can give her more calories.  Suggested for now, pt work on increasing the portions of her safe foods-discussed what that may look like r/t the foods she is eating. Pt thinks this is a good plan of action for now.     Still working on getting appropriate KENROY for our department to be able to have pt fill out to give to her therapist.     Follow up: 04/25/24 @ 11 am  ___________________________________________________________________________________________  03/26/24  Pt with a tooth removed yesterday. States she had a cup of milk last night and did ok with that. This morning pt had some yogurt and did ok with it as well.  Prior to procedure pt did purchase some more yogurt, milk, ice cream.      Overall pt has been doing ok since the procedure, didn't go under general  "anesthesia but took benadryl instead and is still feeling a little woozy.     Pt states she may ask her mom to  [employer] mac & cheese, she has gotten some frozen mac & cheese but hasn't tried the microwave yet.  Pt notes lack of support as being a hindrance to things like trying different take out, getting her toaster oven/microwave working/.     Pt reports she is feeling hungry--which is weird for her since she doesn't usually feel hungry.  Pt is excited for the tooth to heal so she can chew on that side again (hasn't been able to try without pain in almost a year).     Since last appointment pt wasn't able to try the Julia mac & cheese but has retried cheeses and some other cheeses she hasn't tried in a bit (trying different brands), also different types of chocolate cookies.     Pt states she has an appointment with her therapist this week for the first time in 2 weeks. States she still isnt sure how helpful it will be for this writer to talk to her therapist also given how busy she says her therapist is.     Pt has been trying to monitor fluids-milk, water, to help prevent dehydration.   Pt plans to try small smears of cream cheese, smushing up american cheese. Pt has cake she wants to try-suggested in a day or so maybe to try letting it soak in milk to get softer. Discussed different ways to try and get electrolytes.     Pt hasn't weighted herself yet, states she was nervous to weigh herself prior to the procedure incase she had lost some wt and it made her stressed out. Pt isnt sure at this time when she plans to weigh herself again-this writer suggested maybe not until she is back to her \"regular\" eating habits  for about a week unless she starts to feel ill or notices her clothes feeling looser.      Next follow up scheduled: 04/11/24      _____________________________________________________________________________________________________________________  03/11/24    Pt stated she was a little late to " the appointment, had a work meeting and stated she was eating her pint of ice cream and it takes her a long time to eat. Pt did not set up her microwave, states that she had to order some new clothes and the chemicals in the packaging gave her an allergic reaction and created a smell in her house-pt was also concerned maybe there was a mouse that  in the wall. So pt stated that those things plus more contributed to the barriers as to why she didn't meet her goals.     States she had to order new clothes   Pt stated she was able to try the string cheese today but doesn't think she can try a new food today. States they are spraying outside and worried if she has a reaction she wouldn't be able to go outside to go to the hospital. Pt states even if it is a food she has had in the past-what if the company changed their ingredients or what if she develops a new allergy. Pt did have some Boars head and [place] cheese-that she has had in the past-since she couldn't find her preferred cheeses. States she had to go out to a 3rd store and being out in the sun and just being outside more made her exhausted. Pt was also trying to check out different areas of the store and feels the different parts of the store had different lighting and that affected her.  She also felt was a bonus she picked up some more yogurt and hopes to try it in the next few days-even got raspberry instead of just vanilla.  Wheat thins*    Pt states she was ok with this writer talking with her therapist who she has been seeing over the last 20 years but pt states her therapist is so busy and test[place] so she may not be able to get back to this writer. Pt would prefer not to tell this writer the name of their therapist (out of ne to reach out to there therapist and pass on this writer's Therapist has helped pt with working with OCD r/t transporting foods from the store to help reduce the fear of food borne illness.       Discussed  concerns about pt with adequate nutrition after having dental procedure with not as likely to be going out to get food and she will likely have limitations with food textures, she also may not be feeling well and appetite may decrease.  Pt feels it takes her longer to eat lunch and dinner.  Pt thinks that she may be more likely to increase her intake at breakfast-states she may be more likely to work more food into that meal.  When it came to talking about trying new foods and being specific about it when she can try a food, pt stated she understood SMART goals but that she just never knows.  Pt was willing to set a goal-see goal. Once pt is healed will discuss trying new foods, the main goal until then is adequate kcal-pt agreeable with this.     F/U the day after her procedure-as pt request-03/26/24  ____________________________________________________________________________________________________________________________________________________________________________________________________________________________  02/29/24    Within a 6mo to a year time span pt developed COVID (2020) then had to move out of apartment and developed other health issues and this is when the majority of the wt loss occurred. Pt works from home, 2 jobs. Pt also states she had her 1st wisdom tooth removal and was put under anesthesia and that also affected wt loss. Pt is supposed to have another tooth pulled at the end of March.     Pt notes a rare allergy to light as well as pesticides and weed killers (long standing allergy but it has gotten worse). States this hinders access to foods. Pt states she is a single parent of an adult disabled child-also has restrictive diet d/t autism. States her daughter also has smell issues. States she cannot have food delivered d/t pesticide allergy. Pt also developed esophagitis, intermittent, takes a long time for her to eat. States d/t health issues she cannot be tested for cancer. Pt notes  "seeing a therapist for years but they do not have experience with her health issues. Pt also notes bouts of nausea, also has issues with food smells and tastes--they all affect appetite and she will lose wt and not be able to gain it back. Pt denies wanting to lose wt, pt would like to eat foods again. Pt states many of her preferred snack foods have been phased out (notes cheese puffs).  Pt nervous to try new foods or try foods she has previously cut out. Pt notes her oven makes a bad smell since it is new so she doesn't use it. Pt has a new microwave at home but hasn't taken it out yet, states she hasn't had time to get it out and use it, states family has a [employer] oven and they are trying to \"burn it out for her\". Pt has had some blood work testing done for allergy-saw shellfish  Pt reports getting food poisoning over Thanksgiving and now is paranoid/concerned about this happening again.  Pt notes fear of trying supplements d/t ingredients.  Pt notes she would rather try cereal as an additional source of kcal va oral supplements. Pt states an issue is being concerned about what the ingredients of the food are but pt couldn't state what ingredients are actually ones she would avoid other than MSG and some preservatives. Pt states he was willing to try eating yogurt-she gets it for her daughter but forgets to eat it, same with string cheese.    As per meeting and diet hx ps is observed to meet the criteria for ARFID. Pt with significant wt loss     Labs of note: 12/27/23 D <14, A1C 5.7, TSH 8.41, elevated lipid panel, elevated TPO, very low vitamin C    Pt has also cut foods out d/t feeling like they make her feel like she has marbles in her mouth and then her throat will start to constrict.     Pt would like to be able to eat chips, french fries, a lot of frozen foods and italian foods.     Pt feels she would  As per conversation, lack of available way for pt to consume foods. Pt has microwave and just has to " wash and put in the turntable. Most of the appointment today was gather information from pt, build rapport, discuss possible routes to go moving forward.  See goals.     Follow up scheduled for March 11 @ 1:30 pm-will work on pt consuming a new food during this appointment.        Nutrition Diagnosis:   Disordered eating pattern  related to multiple health issues, allergies as evidenced by wt loss, inadequate i       Any change or new dx since previous visit:     Nutrition Diagnosis:   Underweight  related to Inadequate energy intake as  evidenced by BMI <18.5      Medical Nutrition Therapy Intervention:  [x]Individualized Meal Plan  0356-2220 kcal (35-40 g/kg)  63-84 g PRO (1.2-1.5 g/kg)  7862-7301 mL (1 mL/kcal) []Understanding Lab Values   []Basic Pathophysiology of Disease []Food/Medication Interactions   []Food Diary [x]Exercise: Not necessarily encouraged for now until wt stabilizes   [x]Lifestyle/Behavior Modification Techniques:  incorporating new foods, increasing intake at meals, incorporating snacks.  []Medication, Mechanism of Action   []Label Reading: CHO/ Na/ Fat/ other_________ []Self Blood Glucose Monitoring   [x]Weight/BMI Goals: gain/lose/maintain: Avoid wt loss []Other -           Comprehension: []Excellent  []Very Good  [x]Good  []Fair   []Poor    Receptivity: []Excellent  []Very Good  [x]Good  []Fair   []Poor    Expected Compliance: []Excellent  []Very Good  [x]Good  []Fair   []Poor          Goals: 08/28/24:   AM: carnation + buttered pancakes with butter & syrup  Lunch: Pint of icecream  Dinner: Either repeat breakfast, repeat lunch, or try one of the frozen pasta meals    2.       3.     Goals: 08/06/24: (no goals set 07/16)     2.       3.     Goals: 07/02/24:  Pt is going to increase her milk by 1/2c to 1c/day.   2.       3.       Goals: 06/04/24:  Pt is going to increase her milk by 1/2c to 1c/day.   2.       3.       Goals: 05/07/24:  Pt didn't feel she could set goals at this time.    2.   "     3.       Goals: 04/11/24:  Pt will aim to eat a 1/4 more of her typical serving of foods at each meal   2.       3.       Goals (initial) 03/26/24:  Starting day 2/3 post procedure to consume 2 slices of cheese per day   2.    Pt will ask her mom to bring her [employer] mac & cheese    3.       Goals (initial) 03/11/24:  Within the next week try either Julia mac & cheese or another type of mac & cheese   2.       3.     Goals (initial) 02/29/24:  Weekend of march 9th/10 make pancakes in the microwave   2.    Set an alarm in her phone to eat a string cheese in the evening with her daughter.    3.     Labs:  CMP  Lab Results   Component Value Date    K 4.2 12/27/2023     12/27/2023    CO2 27 12/27/2023    BUN 7 12/27/2023    CREATININE 0.78 12/27/2023    CALCIUM 9.6 12/27/2023    AST 11 08/22/2023    ALT 6 08/22/2023    ALKPHOS 36 08/22/2023    EGFR 94 12/27/2023       BMP  Lab Results   Component Value Date    CALCIUM 9.6 12/27/2023    K 4.2 12/27/2023    CO2 27 12/27/2023     12/27/2023    BUN 7 12/27/2023    CREATININE 0.78 12/27/2023       Lipids  No results found for: \"CHOL\"  No results found for: \"HDL\"  No results found for: \"LDLCALC\"  No results found for: \"TRIG\"  No results found for: \"CHOLHDL\"    Hemoglobin A1C  Lab Results   Component Value Date    HGBA1C 5.7 (H) 12/27/2023       Fasting Glucose  No results found for: \"GLUF\"    Insulin     Thyroid  Lab Results   Component Value Date    TSH 8.41 (H) 12/27/2023       Hepatic Function Panel  Lab Results   Component Value Date    ALT 6 08/22/2023    AST 11 08/22/2023    ALKPHOS 36 08/22/2023       Celiac Disease Antibody Panel  No results found for: \"ENDOMYSIAL IGA\", \"GLIADIN IGA\", \"GLIADIN IGG\", \"IGA\", \"TISSUE TRANSGLUT AB\", \"TTG IGA\"   Iron  Lab Results   Component Value Date    IRON 71 05/01/2021    TIBC 394 05/01/2021    FERRITIN 18 05/01/2021            Tiffany Mijares RD, LDN  [facility] " [facility]  [address] [place] [id number]

## 2024-09-09 ENCOUNTER — TELEPHONE (OUTPATIENT)
Age: 49
End: 2024-09-09

## 2024-09-09 NOTE — TELEPHONE ENCOUNTER
Carpet Beetle Infestation    Advised patient she will need a referral to be seen. Concerned carpet beetles making her sick.     Found these carpet beetles and their larvae in her scalp/body and her daughter's scalps; all over apartment; on their bedding; throughout apartment; on paper towels. Worried eating carpet beetles.    Advised she needs to contact her PCP, if worried she has parasites would need PCP to order Stool O&P, Blood smears; possible call [facility] to help with landlord/infestation issues.    Patient had no further questions and/or concerns at this time.

## 2024-09-11 ENCOUNTER — CLINICAL SUPPORT (OUTPATIENT)
Dept: NUTRITION | Facility: HOSPITAL | Age: 49
End: 2024-09-11
Payer: COMMERCIAL

## 2024-09-11 DIAGNOSIS — R63.4 WEIGHT LOSS, UNINTENTIONAL: Primary | ICD-10-CM

## 2024-09-11 PROCEDURE — 97803 MED NUTRITION INDIV SUBSEQ: CPT

## 2024-09-11 NOTE — PROGRESS NOTES
" Nutrition Assessment Form    Patient Name: Cate Peña    YOB: 1975    Sex: Female     Assessment Date: 9/11/2024  Start Time:  11:00 am Stop Time: 11:30 am Total Minutes: 30     Data:  Present at session: self   Parent/Patient Concerns/reason for visit: \"My scalp has been itchy and found out we have carpet beetles\"   Medical Dx/Reason for Referral: R79.89 (ICD-10-CM) - Low serum vitamin C & restrctive diet  Provider: Neftali Rose MD    Past Medical History:   Diagnosis Date    Costochondritis     Deviated septum 2023    left-with spur    Fibroid, uterine     Food poisoning 2023    Hiatal hernia 07/2024    sliding    Ovarian cyst     Seasonal allergies     Thyroid disease     Vitamin D deficiency        Current Outpatient Medications   Medication Sig Dispense Refill    albuterol (PROVENTIL HFA,VENTOLIN HFA) 90 mcg/act inhaler Inhale 2 puffs every 6 (six) hours as needed      ascorbic acid (VITAMIN C) 250 mg tablet Take 1,000 mg by mouth daily (Patient not taking: Reported on 4/5/2024)      EPINEPHrine (EPIPEN) 0.3 mg/0.3 mL SOAJ Inject 0.3 mL (0.3 mg total) into a muscle if needed for anaphylaxis 0.6 mL 11    ergocalciferol (VITAMIN D2) 50,000 units  (Patient not taking: Reported on 12/10/2021)      famotidine (PEPCID) 20 mg/2.5 mL oral suspension Take 2.5 mL (20 mg total) by mouth 2 (two) times a day 150 mL 1    loratadine 5 mg/5 mL syrup Take 10 mg by mouth in the morning      sucralfate (CARAFATE) 1 g/10 mL suspension Take 10 mL (1 g total) by mouth 4 (four) times a day (with meals and at bedtime) 1200 mL 0    Synthroid 75 MCG tablet Take 1 tablet 6 days a week and no tab on Sunday. 26 tablet 0    Tirosint-SOL 75 MCG/ML SOLN 75 mcg liquid orally daily 30 mL 6     No current facility-administered medications for this visit.        Additional Meds/Supplements: N/A   Special Learning Needs/barriers to learning/any new barriers  N/A-pt thinks she may have autism   Height: 5'6 \" HC Readings from " "Last 5 Encounters:   No data found for HC      Weight:   09/11/24 90#  08/06/24 wts have went down to 88-90# before EGD and that has been stable    07/16/24 Today pt states she has been between 92-93#    06/04/24 Pt states she has been between 94# & 96#   Wt Readings from Last 10 Encounters:   08/21/24 39.9 kg (88 lb)   07/24/24 39.9 kg (88 lb)   06/04/24 43.1 kg (95 lb)   04/05/24 40.8 kg (90 lb)   02/29/24 42.2 kg (93 lb)   12/07/23 41.7 kg (92 lb)   12/07/23 41.7 kg (92 lb)   10/13/23 43.5 kg (96 lb)   11/22/22 44.5 kg (98 lb)   11/16/22 44.9 kg (99 lb)     Estimated body mass index is 14.2 kg/m² as calculated from the following:    Height as of 8/21/24: 5' 6\" (1.676 m).    Weight as of 8/21/24: 39.9 kg (88 lb).   Recent Weight Change:  [x]Yes     []No  Amount: +2-3# x 1 mo      Energy Needs: 9098-4002 kcal (35-40 g/kg)  63-84 g PRO (1.2-1.5 g/kg)  1529-6258 mL (1 mL/kcal)   Allergies   Allergen Reactions    Metronidazole Anxiety, Dizziness, Palpitations and Shortness Of Breath    Other Anaphylaxis, Hives, Shortness Of Breath, Itching, Photosensitivity, Nausea Only, Swelling, Anxiety, Palpitations, Rash, Other (See Comments), Tinnitus, GI Intolerance, Dizziness, Headache, Tachycardia, Confusion, Chest Pain, Fatigue, Irritability, Syncope, Drowsiness and Nasal Congestion     Fluorescent and halogen lights     *Solar Urticaria- Sun and    *Pressure Urticaria    *Pesticides    Strawberry (Diagnostic) - Food Allergy Dermatitis, Hives, Itching, Rash and Shortness Of Breath    Sulfites - Food Allergy Anaphylaxis, Hives and Shortness Of Breath    Adhesive  [Medical Tape] Hives    American Cockroach Other (See Comments)    Cat Hair Extract Other (See Comments)    Dog Epithelium (Canis Lupus Familiaris) Other (See Comments)    Latex Hives    Penicillin G     Pollen Extract Other (See Comments)    Shellfish-Derived Products - Food Allergy Other (See Comments)    Shrimp Extract Allergy Skin Test - Food Allergy Other (See " "Comments)    or intolerances When younger-was allergic to strawberries; 20 years ago had hives once when had frozen fish-thinks from sulfites;    Social History     Substance and Sexual Activity   Alcohol Use No       Social History     Tobacco Use   Smoking Status Never   Smokeless Tobacco Never       Who shops? patient   Who cooks/cooking methods/Eating out/take out habits   patient     Exercise: Non at this time.     Other: ie: Sleep habits/ stress level/ work habits household-lives with ?/ food security    Prior Nutritional Counseling? [x]Yes     []No  When: 08/28/24     Why: Follow up with this writer        Diet Hx: as per pt s recall of 09/11/24,   Breakfast: Yogurt (150 kcal)   Lunch: Icecream pint         Dinner: [place] breakfast  essentials       Snacks None   Other Notes/ Initial Assessment:    The patient was identified by name and date of birth. Cate Peña was informed that this is a telemedicine visit and that the visit is being conducted through [employer] VIA [facility]. She agrees to proceed..  My office door was closed. No one else was in the room.  She acknowledged consent and understanding of privacy and security of the video platform. The patient has agreed to participate and understands they can discontinue the visit at any time. Patient is aware this is a billable service.       09/11/24  As of 09/09 pt has identified she has an \"infestation\" of carpet beetles in her home. Concerned about the amount of kcal pt is going to burn with cleaning up the carpet beetles, also the amount of money for supplies and loss of work hours--less money available for food.  Pt cut her hair off to only a few inches so she can see the beetles in her hair, They are also her in daughters hair. Pt has an appointment with dermatologist today at 1 pm r/t the situation. States she has also reached out to ID (phone call in chart review as well from 09/09).      At beginning of conversation pt stated \"I dont know if I am " going to survive through this, I dont see how I can live though this. This writer offered to call an ambulance for pt but pt declined. This writer suggested pt go to the ED but she declines. States she doesn't have anyone who can take herself or her daughter since they have bugs and that no one has room even for her daughter.Pt is refusing getting exterminators d/t fear of being allergic/sensitive to any pesticides/sprays, does not have any support to help her clean. Pt plans to get rid of much of her furniture and clothing that could be contaminated.  Pt does feel helpless r/t all aspects of this situation. This writer spoke with pt's therapist after appointment was over (KENROY in pt's chart)-she spoke with pt yesterday and has the same concerns, made similar offers and suggestions r/t pt going to the ED but pt also refused. Pt denies any plans to suicide or to harm herself.     She is very concerned that they are now in her body and are/have been contributing to her GI issues. Pt also stated that a few times recently she coughed and some tiny rice like cranules came out and she is concerned they are the bugs coming out from being in her body.  Pt states she is in a facebook group r/t carpet beetles, feeling pts reliance on facebook groups for her concerns about ailments may fuel her anxiety.    Pt is incredibly distraught and anxious. States it is hard to eat because her scalp is on fire and issues listed above.  Pt is trying to eat her yogurt in the morning. Recommended mixing the powdered carnation breakfast essentials (CBE) in with her yogurt but pt doesn't think it would mix in properly. Pt is only having one serving of CBE and doesn't think she can add double the powder added to the same amount of water because it wont mix up well. This writer again offered to have a free case of [employer] supplements sent to her house but pt refusing, fearing trying something new may cause an allergic reaction and if she does  have one, she doenst know if it would be related to that or the beetles.     Before this all started pt hadn't touched on any of the previous goals: concerned her mom may have lung cancer & concern about losing her job.  This writer is greatly concerned about this pt's safety r/t physical and mental health. Will reach out to pt's PCP & GI providers which are in network with this writer.   Update: Spoke to pts psychologist Dr Olivier [phone] after meeting with pt.  She is also concerned about pt in general but especially after learning of this situation    This writer will reach out to pt in 1 week r/t checking in and scheduling the next appointment.       _____________________________________________________________________________________  08/28/24    Pt had swallow study done 08/12/24.  Pt brought a lot of her own foods that she felt safe with. Tried water, pudding, blueberry bars, soft chcolate chip cookie.  As per note and pt, they werent able to do a thorough assessment d/t lack of sufficiently different textures and with pt chewing so much. Pt was referred to ENT & Otolaryngology, an upper barium swallow. Pt is concerned about abdominal cancer and that may be causing the wt loss-has plans to an abdominal ultrasound and CT scan (would like to get this done within a month).  Pt still running into issues with getting tests/procedures done d/t sun allergy.  Pt is going to look into maybe getting into see a SLP for swallowing therapy.         Pt was told that her sliding hiatal hernia isnt affecting her swallowing as much as pt thinks it is but pt thinks it does play a larger role.     Since last appointment pt as tried mc donalds again and had a few fries and 2 chicken nuggets and it took her a long time to eat them. After the 08/12/24 procedure she had to go to [email or web address] for lunch and had some pancakes with cereal and butter plus shome hashbrown casserole. Pt feels she did well with that but it again,  took a long time to eat them. Pt notes this was a very stressful situation. Pt went to [employer] again this weekend for more pancakes with butter and maple syrup.    Pt states she is concerned about the freezer temperatures at her local [employer] store (she asked the store) r/t getting food poisoning from the ice cream she gets.  Pt also has on hand some frozen fettuccini, ravioli, macaroni & cheese, rene young    Pt has purchased vanilla and chocolate carnation breakfast essentials and does well with them. Disucssed the importance of increasing kcal, benefits of getting on board with 3 scheduled mealtimes to be able to better assess intake and plan for intake.     2 carnation instant breakfast with 8oz milk + 1.5 servings of of powder  1-2 pints of icecream  Double portion of pancakes with butter or mac & cheese/frozen ravioli    Goals:   AM: carnation + buttered pancakes with butter & syrup  Lunch: Pint of icecream  Dinner: Either repeat breakfast, repeat lunch, or try one of the frozen pasta meals       Follow up scheduled for 09/11/24    ___________________________________________________________________________________________________________________________  08/06/24    Pt had lost wt since right before EGD.      Pt with an EGD 07/24/24, results from imaging and tissue biopsy unremarkable for h-pylori & EOE. Pt was hoping that the h-pylori was positive because she heard that getting that treated helps with her sun allergy.  A sliding hiatal hernia was found.   Pt with a barium swallow procedure 08/12/24 as long as pt can be protected from her sun allergy with the lights and she thought it was going to be cloudy that day but now thinks it is going to be marvin which affects her more.   Pt believes she will meet with a SLP who will do a swallow evaluation that day as well.      It is still taking a long time  for pt to eat, longer than usual.   Pt has reintroduced muffin tops (220 kcal/each), 1x/d, trying to  move to 2x/d. She is able to eat foods inside ice cream (example brownies & cookies).  Lookst at iron and potassium content of ice cream.  Aiming for 2 pints of icecream per day to incorporate more kcal/fat.     New foods pt has tried: a new icecream, lean cuisine mac & Cheese ( !!) This is big for pt, she stated that it took 30 min to eat 10 noodles and that there wasn't enough soft.     Opened carnation breakfast essentials ready to drink but didn't like the smell but also got the powdered version, plans to try boost, ensure, pediasure, maybe even ovaltine. Suggested trying to mis the carnation breakfast essentials. Pt is still aiming for very soft foods.  Discussed the benefits of incorporating other foods other than sugary foods. For higher calorie softer foods, I was thinking maybe some of those egg bowls you can microwave and add butter and cream to, mashed avocado (you can buy avocado chunks frozen, defrost and easier to mash), cream of wheat or farina made with heavy cream & add butter, add parmesan cheese or cream cheese to masked potatoes and mac & cheese.      Pt had to get to a work meeting, will contact this writer to schedule a follow up.       ___________________________________________________________________________________________________________________________________________  07/16/24  [facility]. Pt was told that she cannot get directly admitted to the ED by GI and it wouldn't be guaranteed that if she went to the ED that she would get him as a dr for the EGD.  So they decided to schedule the EGD for 07/24/24 @ [facility] with her GI-Dr arellano. Pt is unsure if she is going to be admitted after the procedure to inpatient status to try and get other tests/bloodwork done. Pt states she was told this depends on     Wt has been fluctuating between 90-92#.  Still trying to drink milk, diet still about the same.  Has been trying to eat spoon fulls of  butter with salt on top. Hasn't tried to let the michelle's muffin top in milk. Pt is too afraid to eat cheese, crackers, other foods for fear she will choke. Hasn't had orange juice in days, PO has been very poor. .     Pt states she has been waking up in the middle of the night with saliva pooling in her mouth and even having a hard time swallowing her claritin. Pt told GI about this, they have had a few conversations over the last few days.     Discussed some ideas for softer foods pt may be able to tolerate: Baby food-make sure stage 1-containers or pouches, [place] breakfast essential powder, Refried beans beans or baked beans, Grits, farina, cream of whear-dry in cereal section, Bone broth, Heavy cream, Pudding-shelf stable, Fruit blend applesauce pouch, Spray cheese, Sour cream dip-already made or regular sour cream with ranch powder, Baby food melts-yogurt or the cereal ones.      Follow up scheduled tues 08/06/24   _______________________________________________________________________________________________________________________________________________________________________    07/02/24  Pt states her wt has gone from 94-95# to 92-93#.   PO intake has decreased over the last week. Pt notes that she has been having issues with swallowing, pt thought it was a bad batch of the brownie ice cream.  Pt contacted [employer] to see how they handle the product and she was told that they put the box of ice cream on the floor before putting it into the freezer, pt states she is not ok with that practice. Pt them went to look into getting the ice creams from [employer] instead but used a new reusable bag.  Pt stated the inside of the bag smelled and it made her ice cream smell so she didn't eat any of it.  Pt then had to try a new cayla & jerrys ice cream and did ok with it even though it had caramel in it until a few hours later when throat started to tighten up.     Pt contacted GI who are really pushing for pt to have  "an EGD.  Pt was suggested to take Claritin but even had an issue with swallowing the pill.  contacted GI who are really pushing for pt to have an EGD.  At this time d/t pt's strong concern about not being able to swallow, she is now agreeable to getting an EDG done but very concerned about sun allergy and environmental allergy.  Not only is pt agreeable to getting the EGD but would also like to stay overnight and have any and all labs/procedures done. Pt would like to be seen by a speech therapist r/t dysphagia. Pt has been in contact with her GI and is waiting to hear back from them about the proper course to take to get her in with consideration for her sun allergy.  States the dr is currently in surgery and she is waiting for them to call her back when he is done, she hopes to get within the next few days.    Pt has been drinking (3) 16oz bottles of water but pt is also concerned because it is taking her so much longer to drink even thin liquids.  This writer has had a conversation with pt's therapist since the last OP MNT appointment. Before being made aware that pt may be going to have procedure done, this writer was going to discuss with pt the real concern about her malnutrition and that being it has been over 4 months since the initial appointment, a conversation would need to be had about how to make additional moves give the real concern with malnutrition.  Will address this at next visit if appropriate.     Follow up scheduled 07/16/24  __________________________________________________________________________________________________________________________    06/04/24     Pt notes that her nausea and esophagitis has been at \"baseline\" only gets worse with Dove chocolates-which she has previously tolerated. Pt notes she is feeling a \"pins & needles\" pain all over her body.  Pt states while she knows that DM has been ruled out numerous times, she still feels like sometimes her BG spikes d/t how she feels. " "Pt knows eating some protein with a sugary food can help with slowing the spike in BG so trying to consume milk with cookies. Pt hasn't been using the microwave d/t often being more tired or nervous about the smell that the mac & cheese would emit and since she cannot open the windows d/t outside allergens.  Pt also concerned about being allergic to the light emitted from the microwave and concern about rust being in the microwave and that causing a reaction from her.  Pt note has the toaster--didn't as of the last appointment. States her family had tried it out and they tell her it doesn't have a smell.     Pt notes the stores she goes to have the safe foods that they have often been out of.    The last 2 weeks has been able ot eat 3 muffin tops per day. Prior to this week pt has been eating cheese -now up to 3 slices + more crackers-suggested pt trying to butter the crackers before eating but pt stated she has not liked this in the past.  Pt wasn't able to go out to the store Sunday d/t neighbor doing yard work earlier than usual and people were \"spraying\" outside. Pt has been getting chicken nuggets, fries, mc flurries most weekends. Still drinking orange. Suggested trying to get     Pt notes she has been weighing 94-96# over the last few weeks.  Pt wonders if this is r/t issues with her thyroid-last time checked was 12/2023. Pt knows she should be going to get blood work but not sure when she will be go out d/t the weather, outside allergens, people mowing. Suggested looking into if people can come to her house.    New foods: [employer] cookies, a new [employer] ice cream flavor (chocolate therapy), has 2 other flavors but after pt opened them up the containers were only 3/4 of the way filled and this turned pt off.    On pt's list to try: more buttered pancakes, different ice cream flavors, increasing milk intake (currently @ 1c/d)    16oz bottles of water (3x/d), worried if has an additional cup of " "water (2c/d) + 1c of orange juice.    Pt has been applying to new jobs-2nd job.  Pt concerned about this d/t not being able to be outside, that it takes her about 2 hours to eat lunch. Pt states she does need this 2nd job to be able to afford her perferred foods     At this time pt feels like it is more likely that she is able to increase her preferred foods vs trying new foods as a means to get more calories.       Follow up scheduled  07/02/24  _______________________________________________________________________________________    05/07/24    Pt provided a signed copy of the KENROY for her therapist, scanned into system.    Pt feels like she is able to chew again as per normal since the procedure. Feels she is so used to eating on the non-procedure side.  Pt is working on trying to remember to eat on the other side.     Pt doesn't feel she has made much progress. States she has had some health setbacks which have affected her appetite/PO intake.  Pt attempted orange juice to get more vitamin C. States she had an OCD episode ardound the same time and also had more phleghm with the OJ.     Pt did try out one new food-[employer] soft chocolate chip cookies, felt she had an allergic reaction-used to tolerate them but this was a set back.  Pt also had an issue with running out of her \"safe\" hand soap so had a reaction to a new soap she has tried.  States she now has a burning throat irritation, feels it is causing her to have styes in her eye, also thinks she may have a UTI from it. Pt is fearful that trying a new soap will put her into anaphylaxis shock.     Pt feels these issues have been making her eat less d/t allergy.  Pt also notes her dear of choking while eating if she is trying to talk to someone.    Long term goal is to go with daughter to [facility] and eat there. Pt wants to be able to give pt more \"regular\" experiences.  Suggested that pt try to get take out from there to try different items to see if "     Follow up:  06/04/24    ___________________________________________________________________________________________________  04/11/24      Pt with GI f/u 04/05/24 (virtual).  Provider had recommended an EDG but pt is not feeling up to it at this time d/t solar urticaria and light sensitivity. Pt confirms this and doesn't know what she can do about that at this time but is considering to do it in the fall when outside allergens are reduced. Pt does state that she was looking to see how things went with her dental procedure r/t tolerance, support system.     This writer asked pt if dr had mentioned EOE, she stated they have but can't assess for that until she has an EGD.    Pt has tried tasty cake cookie bars again after a few years of not having them-states her eats started to close up and her throat became tight. Pt thinks her esophagitis was triggered. Pt followed up with 2 providers about this, one things it is GERD related, the other more allergy related. This has increased pt's anxiety with trying new foods.     Pt has reintroduced low-acid, no pulp orange juice into diet-pt with hx of low vitamin C levels. Pt is chewing better but still a little more hesitant to chew hard foods on that side-has been ok with crackers. Pt feels like she will feel more confident after the 1 mo post procedure suad.      Pt has tried pancakes with butter heated up in the microwave and tolerated them. Plans to introduce them more regularly. Pt states she is a little nervous about making other foods incase other foods will create certain smells that are noxious to her.    Pt also ate some fast food chicken nuggets and fries this past Sunday and ate a larger portion than she has had in the past.   It takes pt a long time to eat her pint of icecream and by the time that she is done it is like 2 pm and then isnt as hungry by the time dinner comes. Pt says she plans to continue to eat the whole pint at once and divulged some new  information. States that she only uses disposable plates/utensils/cups  for fear of a reaction to the chemicals in cleaning products and what the  will emit when opening it up after running it.  Suggested pt still eat from the carton but 1/2 in the AM & 1/2 in the PM but she would still rather eat it all at once.     Pt also bought felipe ring ding cookies as something to add in as another snack.     Overall, since last appointment pt has increased anxiety around the likely kasper of having an allergic reaction to new foods even though she knows increasing new foods will help increase foods that can give her more calories.  Suggested for now, pt work on increasing the portions of her safe foods-discussed what that may look like r/t the foods she is eating. Pt thinks this is a good plan of action for now.     Still working on getting appropriate KENROY for our department to be able to have pt fill out to give to her therapist.     Follow up: 04/25/24 @ 11 am  ___________________________________________________________________________________________  03/26/24  Pt with a tooth removed yesterday. States she had a cup of milk last night and did ok with that. This morning pt had some yogurt and did ok with it as well.  Prior to procedure pt did purchase some more yogurt, milk, ice cream.      Overall pt has been doing ok since the procedure, didn't go under general anesthesia but took benadryl instead and is still feeling a little woozy.     Pt states she may ask her mom to  [employer] mac & cheese, she has gotten some frozen mac & cheese but hasn't tried the microwave yet.  Pt notes lack of support as being a hindrance to things like trying different take out, getting her toaster oven/microwave working/.     Pt reports she is feeling hungry--which is weird for her since she doesn't usually feel hungry.  Pt is excited for the tooth to heal so she can chew on that side again (hasn't been able to try without pain in  "almost a year).     Since last appointment pt wasn't able to try the Julia mac & cheese but has retried cheeses and some other cheeses she hasn't tried in a bit (trying different brands), also different types of chocolate cookies.     Pt states she has an appointment with her therapist this week for the first time in 2 weeks. States she still isnt sure how helpful it will be for this writer to talk to her therapist also given how busy she says her therapist is.     Pt has been trying to monitor fluids-milk, water, to help prevent dehydration.   Pt plans to try small smears of cream cheese, smushing up american cheese. Pt has cake she wants to try-suggested in a day or so maybe to try letting it soak in milk to get softer. Discussed different ways to try and get electrolytes.     Pt hasn't weighted herself yet, states she was nervous to weigh herself prior to the procedure incase she had lost some wt and it made her stressed out. Pt isnt sure at this time when she plans to weigh herself again-this writer suggested maybe not until she is back to her \"regular\" eating habits  for about a week unless she starts to feel ill or notices her clothes feeling looser.      Next follow up scheduled: 24      _____________________________________________________________________________________________________________________  24    Pt stated she was a little late to the appointment, had a work meeting and stated she was eating her pint of ice cream and it takes her a long time to eat. Pt did not set up her microwave, states that she had to order some new clothes and the chemicals in the packaging gave her an allergic reaction and created a smell in her house-pt was also concerned maybe there was a mouse that  in the wall. So pt stated that those things plus more contributed to the barriers as to why she didn't meet her goals.     States she had to order new clothes   Pt stated she was able to try the string cheese " today but doesn't think she can try a new food today. States they are spraying outside and worried if she has a reaction she wouldn't be able to go outside to go to the hospital. Pt states even if it is a food she has had in the past-what if the company changed their ingredients or what if she develops a new allergy. Pt did have some Boars head and Sargento cheese-that she has had in the past-since she couldn't find her preferred cheeses. States she had to go out to a 3rd store and being out in the sun and just being outside more made her exhausted. Pt was also trying to check out different areas of the store and feels the different parts of the store had different lighting and that affected her.  She also felt was a bonus she picked up some more yogurt and hopes to try it in the next few days-even got raspberry instead of just vanilla.  Wheat thins*    Pt states she was ok with this writer talking with her therapist who she has been seeing over the last 20 years but pt states her therapist is so busy and test[place] so she may not be able to get back to this writer. Pt would prefer not to tell this writer the name of their therapist (out of ne to reach out to there therapist and pass on this writer's Therapist has helped pt with working with OCD r/t transporting foods from the store to help reduce the fear of food borne illness.       Discussed concerns about pt with adequate nutrition after having dental procedure with not as likely to be going out to get food and she will likely have limitations with food textures, she also may not be feeling well and appetite may decrease.  Pt feels it takes her longer to eat lunch and dinner.  Pt thinks that she may be more likely to increase her intake at breakfast-states she may be more likely to work more food into that meal.  When it came to talking about trying new foods and being specific about it when she can try a food, pt stated she understood SMART  goals but that she just never knows.  Pt was willing to set a goal-see goal. Once pt is healed will discuss trying new foods, the main goal until then is adequate kcal-pt agreeable with this.     F/U the day after her procedure-as pt request-03/26/24  ____________________________________________________________________________________________________________________________________________________________________________________________________________________________  02/29/24    Within a 6mo to a year time span pt developed COVID (2020) then had to move out of apartment and developed other health issues and this is when the majority of the wt loss occurred. Pt works from home, 2 jobs. Pt also states she had her 1st wisdom tooth removal and was put under anesthesia and that also affected wt loss. Pt is supposed to have another tooth pulled at the end of March.     Pt notes a rare allergy to light as well as pesticides and weed killers (long standing allergy but it has gotten worse). States this hinders access to foods. Pt states she is a single parent of an adult disabled child-also has restrictive diet d/t autism. States her daughter also has smell issues. States she cannot have food delivered d/t pesticide allergy. Pt also developed esophagitis, intermittent, takes a long time for her to eat. States d/t health issues she cannot be tested for cancer. Pt notes seeing a therapist for years but they do not have experience with her health issues. Pt also notes bouts of nausea, also has issues with food smells and tastes--they all affect appetite and she will lose wt and not be able to gain it back. Pt denies wanting to lose wt, pt would like to eat foods again. Pt states many of her preferred snack foods have been phased out (notes cheese puffs).  Pt nervous to try new foods or try foods she has previously cut out. Pt notes her oven makes a bad smell since it is new so she doesn't use it. Pt has a new microwave at  "home but hasn't taken it out yet, states she hasn't had time to get it out and use it, states family has a toaster oven and they are trying to \"burn it out for her\". Pt has had some blood work testing done for allergy-saw shellfish  Pt reports getting food poisoning over Thanksgiving and now is paranoid/concerned about this happening again.  Pt notes fear of trying supplements d/t ingredients.  Pt notes she would rather try cereal as an additional source of kcal va oral supplements. Pt states an issue is being concerned about what the ingredients of the food are but pt couldn't state what ingredients are actually ones she would avoid other than MSG and some preservatives. Pt states he was willing to try eating yogurt-she gets it for her daughter but forgets to eat it, same with string cheese.    As per meeting and diet hx ps is observed to meet the criteria for ARFID. Pt with significant wt loss     Labs of note: 12/27/23 D <14, A1C 5.7, TSH 8.41, elevated lipid panel, elevated TPO, very low vitamin C    Pt has also cut foods out d/t feeling like they make her feel like she has marbles in her mouth and then her throat will start to constrict.     Pt would like to be able to eat chips, french fries, a lot of frozen foods and italian foods.     Pt feels she would  As per conversation, lack of available way for pt to consume foods. Pt has microwave and just has to wash and put in the turntable. Most of the appointment today was gather information from pt, build rapport, discuss possible routes to go moving forward.  See goals.     Follow up scheduled for March 11 @ 1:30 pm-will work on pt consuming a new food during this appointment.        Nutrition Diagnosis:   Disordered eating pattern  related to multiple health issues, allergies as evidenced by wt loss, inadequate i       Any change or new dx since previous visit:     Nutrition Diagnosis:   Underweight  related to Inadequate energy intake as  evidenced by BMI " <18.5      Medical Nutrition Therapy Intervention:  [x]Individualized Meal Plan  9583-1293 kcal (35-40 g/kg)  63-84 g PRO (1.2-1.5 g/kg)  5744-0297 mL (1 mL/kcal) []Understanding Lab Values   []Basic Pathophysiology of Disease []Food/Medication Interactions   []Food Diary [x]Exercise: Not necessarily encouraged for now until wt stabilizes   [x]Lifestyle/Behavior Modification Techniques:  incorporating new foods, increasing intake at meals, incorporating snacks.  []Medication, Mechanism of Action   []Label Reading: CHO/ Na/ Fat/ other_________ []Self Blood Glucose Monitoring   [x]Weight/BMI Goals: gain/lose/maintain: Avoid wt loss []Other -           Comprehension: []Excellent  []Very Good  [x]Good  []Fair   []Poor    Receptivity: []Excellent  []Very Good  [x]Good  []Fair   []Poor    Expected Compliance: []Excellent  []Very Good  [x]Good  []Fair   []Poor          Goals: 08/28/24:   AM: carnation + buttered pancakes with butter & syrup  Lunch: Pint of icecream  Dinner: Either repeat breakfast, repeat lunch, or try one of the frozen pasta meals    2.       3.     Goals: 08/06/24: (no goals set 07/16)     2.       3.     Goals: 07/02/24:  Pt is going to increase her milk by 1/2c to 1c/day.   2.       3.       Goals: 06/04/24:  Pt is going to increase her milk by 1/2c to 1c/day.   2.       3.       Goals: 05/07/24:  Pt didn't feel she could set goals at this time.    2.       3.       Goals: 04/11/24:  Pt will aim to eat a 1/4 more of her typical serving of foods at each meal   2.       3.       Goals (initial) 03/26/24:  Starting day 2/3 post procedure to consume 2 slices of cheese per day   2.    Pt will ask her mom to bring her [employer] mac & cheese    3.       Goals (initial) 03/11/24:  Within the next week try either Julia mac & cheese or another type of mac & cheese   2.       3.     Goals (initial) 02/29/24:  Weekend of march 9th/10 make pancakes in the microwave   2.    Set an alarm in her phone to eat a string  "cheese in the evening with her daughter.    3.     Labs:  CMP  Lab Results   Component Value Date    K 4.2 12/27/2023     12/27/2023    CO2 27 12/27/2023    BUN 7 12/27/2023    CREATININE 0.78 12/27/2023    CALCIUM 9.6 12/27/2023    AST 11 08/22/2023    ALT 6 08/22/2023    ALKPHOS 36 08/22/2023    EGFR 94 12/27/2023       BMP  Lab Results   Component Value Date    CALCIUM 9.6 12/27/2023    K 4.2 12/27/2023    CO2 27 12/27/2023     12/27/2023    BUN 7 12/27/2023    CREATININE 0.78 12/27/2023       Lipids  No results found for: \"CHOL\"  No results found for: \"HDL\"  No results found for: \"LDLCALC\"  No results found for: \"TRIG\"  No results found for: \"CHOLHDL\"    Hemoglobin A1C  Lab Results   Component Value Date    HGBA1C 5.7 (H) 12/27/2023       Fasting Glucose  No results found for: \"GLUF\"    Insulin     Thyroid  Lab Results   Component Value Date    TSH 8.41 (H) 12/27/2023       Hepatic Function Panel  Lab Results   Component Value Date    ALT 6 08/22/2023    AST 11 08/22/2023    ALKPHOS 36 08/22/2023       Celiac Disease Antibody Panel  No results found for: \"ENDOMYSIAL IGA\", \"GLIADIN IGA\", \"GLIADIN IGG\", \"IGA\", \"TISSUE TRANSGLUT AB\", \"TTG IGA\"   Iron  Lab Results   Component Value Date    IRON 71 05/01/2021    TIBC 394 05/01/2021    FERRITIN 18 05/01/2021            Tiffany Mijares, RASHAD, LDN  [facility]  [address] [place] [id number]    "

## 2024-10-08 ENCOUNTER — TELEMEDICINE (OUTPATIENT)
Dept: GASTROENTEROLOGY | Facility: CLINIC | Age: 49
End: 2024-10-08
Payer: COMMERCIAL

## 2024-10-08 VITALS — BODY MASS INDEX: 14.46 KG/M2 | HEIGHT: 66 IN | WEIGHT: 90 LBS

## 2024-10-08 DIAGNOSIS — R63.4 WEIGHT LOSS, UNINTENTIONAL: ICD-10-CM

## 2024-10-08 DIAGNOSIS — R13.19 ESOPHAGEAL DYSPHAGIA: ICD-10-CM

## 2024-10-08 DIAGNOSIS — D50.9 IRON DEFICIENCY ANEMIA, UNSPECIFIED IRON DEFICIENCY ANEMIA TYPE: ICD-10-CM

## 2024-10-08 DIAGNOSIS — K21.9 GASTROESOPHAGEAL REFLUX DISEASE WITHOUT ESOPHAGITIS: Primary | ICD-10-CM

## 2024-10-08 PROCEDURE — 99214 OFFICE O/P EST MOD 30 MIN: CPT | Performed by: INTERNAL MEDICINE

## 2024-10-08 NOTE — ASSESSMENT & PLAN NOTE
Her upper endoscopy revealed a hiatal hernia but no evidence of reflux esophagitis or Quick's esophagus.  She can continue to take famotidine as needed.

## 2024-10-08 NOTE — ASSESSMENT & PLAN NOTE
She has had difficulty gaining weight and is source for her weight loss was not found during the upper endoscopy.  We are planning for a colonoscopy when she is agreeable as the next step.  She is also going to get CT scan and ultrasound before her next visit.

## 2024-10-08 NOTE — ASSESSMENT & PLAN NOTE
She has iron deficiency anemia and her upper endoscopy did not reveal a source.  We are planning for a colonoscopy but she is reluctant to schedule it at this time.  We will discuss further at her next visit.

## 2024-10-08 NOTE — PROGRESS NOTES
Virtual Regular Visit  Name: Cate Peña      : 1975      MRN: [id number]  Encounter Provider: Jer Owusu MD  Encounter Date: 10/8/2024   Encounter department: [facility] [place]    Verification of patient location:    Patient is located at Home in the following state in which I hold an active license [place]    Assessment & Plan  Gastroesophageal reflux disease without esophagitis  Her upper endoscopy revealed a hiatal hernia but no evidence of reflux esophagitis or Qiuck's esophagus.  She can continue to take famotidine as needed.         Esophageal dysphagia  Her upper endoscopy did not show evidence of a stricture or eosinophilic esophagitis.  She can continue to chew her food well before swallowing.  If her symptoms persist we could consider an esophageal manometry study to evaluate for motility disorder.         Iron deficiency anemia, unspecified iron deficiency anemia type  She has iron deficiency anemia and her upper endoscopy did not reveal a source.  We are planning for a colonoscopy but she is reluctant to schedule it at this time.  We will discuss further at her next visit.         Weight loss, unintentional  She has had difficulty gaining weight and is source for her weight loss was not found during the upper endoscopy.  We are planning for a colonoscopy when she is agreeable as the next step.  She is also going to get CT scan and ultrasound before her next visit.             Encounter provider Jer Owusu MD    The patient was identified by name and date of birth. Cate Peña was informed that this is a telemedicine visit and that the visit is being conducted through the Epic Embedded platform. She agrees to proceed..  My office door was closed. No one else was in the room.  She acknowledged consent and understanding of privacy and security of the video platform. The patient has agreed to participate and understands they can discontinue the visit at any  "time.    Patient is aware this is a billable service.     History of Present Illness     Cate Peña is a 49 y.o. female who continues to have difficulty gaining weight despite seeing a nutritionist and working with a nutritionist.  Since her last visit she has not noticed a significant change in her weight.  She gained about 2 pounds.  She has nausea but denies vomiting.  She has not had any recent constipation or diarrhea.  She continues to have mild intermittent reflux and dysphagia.  Her upper endoscopy on July 24, 2024 was notable only for a 3 cm sliding hiatal hernia but otherwise unremarkable.      REVIEW OF SYSTEMS:    CONSTITUTIONAL: Denies any fever, chills, rigors, but has had difficulty gaining weight.  HEENT: No earache or tinnitus. Denies hearing loss or visual disturbances.  CARDIOVASCULAR: No chest pain or palpitations.   RESPIRATORY: Denies any cough, hemoptysis, shortness of breath or dyspnea on exertion.  GASTROINTESTINAL: As noted in the History of Present Illness.   GENITOURINARY: No problems with urination. Denies any hematuria or dysuria.  NEUROLOGIC: No dizziness or vertigo, denies headaches.   MUSCULOSKELETAL: Denies any muscle or joint pain.   SKIN: Denies skin rashes or itching.   ENDOCRINE: Denies excessive thirst. Denies intolerance to heat or cold.  PSYCHOSOCIAL: Denies depression or anxiety. Denies any recent memory loss.         Objective     Ht 5' 6\" (1.676 m)   Wt 40.8 kg (90 lb)   BMI 14.53 kg/m²   PHYSICAL EXAMINATION:  Appearance and vitals taken from home devices    General Appearance:   Alert, cooperative, no distress, thin   HEENT:  Normocephalic, atraumatic, anicteric. Neck supple, symmetrical, trachea midline.   Lungs:   Equal chest rise and unlabored breathing, normal effort, no coughing.   Cardiovascular:   No visualized JVD.   Abdomen:   No abdominal distension.   Skin:   No jaundice, rashes, or lesions.    Musculoskeletal:   Normal range of motion visualized.   Psych:  " Normal affect and normal insight.   Neuro:  Alert and appropriate.       Visit Time  Total Visit Duration: 20

## 2024-10-08 NOTE — ASSESSMENT & PLAN NOTE
Her upper endoscopy did not show evidence of a stricture or eosinophilic esophagitis.  She can continue to chew her food well before swallowing.  If her symptoms persist we could consider an esophageal manometry study to evaluate for motility disorder.

## 2024-11-11 ENCOUNTER — TELEPHONE (OUTPATIENT)
Age: 49
End: 2024-11-11

## 2024-11-11 ENCOUNTER — APPOINTMENT (EMERGENCY)
Dept: CT IMAGING | Facility: HOSPITAL | Age: 49
DRG: 391 | End: 2024-11-11
Payer: COMMERCIAL

## 2024-11-11 ENCOUNTER — HOSPITAL ENCOUNTER (INPATIENT)
Facility: HOSPITAL | Age: 49
LOS: 1 days | Discharge: LEFT BEFORE DISCHARGE | DRG: 391 | End: 2024-11-12
Attending: EMERGENCY MEDICINE | Admitting: INTERNAL MEDICINE
Payer: COMMERCIAL

## 2024-11-11 DIAGNOSIS — E43 SEVERE PROTEIN-CALORIE MALNUTRITION (HCC): ICD-10-CM

## 2024-11-11 DIAGNOSIS — R13.10 DYSPHAGIA: ICD-10-CM

## 2024-11-11 DIAGNOSIS — R62.7 ADULT FAILURE TO THRIVE: Primary | ICD-10-CM

## 2024-11-11 DIAGNOSIS — D50.9 IRON DEFICIENCY ANEMIA, UNSPECIFIED IRON DEFICIENCY ANEMIA TYPE: ICD-10-CM

## 2024-11-11 LAB
ABO GROUP BLD: NORMAL
ALBUMIN SERPL BCG-MCNC: 4.5 G/DL (ref 3.5–5)
ALP SERPL-CCNC: 55 U/L (ref 34–104)
ALT SERPL W P-5'-P-CCNC: 15 U/L (ref 7–52)
ANION GAP SERPL CALCULATED.3IONS-SCNC: 10 MMOL/L (ref 4–13)
APTT PPP: 28 SECONDS (ref 23–34)
AST SERPL W P-5'-P-CCNC: 15 U/L (ref 13–39)
BASOPHILS # BLD AUTO: 0.08 THOUSANDS/ÂΜL (ref 0–0.1)
BASOPHILS NFR BLD AUTO: 1 % (ref 0–1)
BILIRUB SERPL-MCNC: 0.41 MG/DL (ref 0.2–1)
BLD GP AB SCN SERPL QL: POSITIVE
BUN SERPL-MCNC: 8 MG/DL (ref 5–25)
CALCIUM SERPL-MCNC: 9.6 MG/DL (ref 8.4–10.2)
CHLORIDE SERPL-SCNC: 101 MMOL/L (ref 96–108)
CO2 SERPL-SCNC: 26 MMOL/L (ref 21–32)
CREAT SERPL-MCNC: 0.75 MG/DL (ref 0.6–1.3)
E AG RBC QL: NEGATIVE
EOSINOPHIL # BLD AUTO: 0.05 THOUSAND/ÂΜL (ref 0–0.61)
EOSINOPHIL NFR BLD AUTO: 1 % (ref 0–6)
ERYTHROCYTE [DISTWIDTH] IN BLOOD BY AUTOMATED COUNT: 16.4 % (ref 11.6–15.1)
GFR SERPL CREATININE-BSD FRML MDRD: 94 ML/MIN/1.73SQ M
GLUCOSE SERPL-MCNC: 105 MG/DL (ref 65–140)
HCT VFR BLD AUTO: 36 % (ref 34.8–46.1)
HGB BLD-MCNC: 10.8 G/DL (ref 11.5–15.4)
IMM GRANULOCYTES # BLD AUTO: 0.03 THOUSAND/UL (ref 0–0.2)
IMM GRANULOCYTES NFR BLD AUTO: 0 % (ref 0–2)
INR PPP: 0.99 (ref 0.85–1.19)
LYMPHOCYTES # BLD AUTO: 1.36 THOUSANDS/ÂΜL (ref 0.6–4.47)
LYMPHOCYTES NFR BLD AUTO: 16 % (ref 14–44)
MCH RBC QN AUTO: 23.4 PG (ref 26.8–34.3)
MCHC RBC AUTO-ENTMCNC: 30 G/DL (ref 31.4–37.4)
MCV RBC AUTO: 78 FL (ref 82–98)
MONOCYTES # BLD AUTO: 0.76 THOUSAND/ÂΜL (ref 0.17–1.22)
MONOCYTES NFR BLD AUTO: 9 % (ref 4–12)
NEUTROPHILS # BLD AUTO: 6.21 THOUSANDS/ÂΜL (ref 1.85–7.62)
NEUTS SEG NFR BLD AUTO: 73 % (ref 43–75)
NRBC BLD AUTO-RTO: 0 /100 WBCS
PLATELET # BLD AUTO: 427 THOUSANDS/UL (ref 149–390)
PMV BLD AUTO: 9.1 FL (ref 8.9–12.7)
POTASSIUM SERPL-SCNC: 3.6 MMOL/L (ref 3.5–5.3)
PROT SERPL-MCNC: 7.9 G/DL (ref 6.4–8.4)
PROTHROMBIN TIME: 13.8 SECONDS (ref 12.3–15)
RBC # BLD AUTO: 4.62 MILLION/UL (ref 3.81–5.12)
RH BLD: POSITIVE
SODIUM SERPL-SCNC: 137 MMOL/L (ref 135–147)
SPECIMEN EXPIRATION DATE: NORMAL
T4 FREE SERPL-MCNC: 0.67 NG/DL (ref 0.61–1.12)
TSH SERPL DL<=0.05 MIU/L-ACNC: 7.96 UIU/ML (ref 0.45–4.5)
WBC # BLD AUTO: 8.49 THOUSAND/UL (ref 4.31–10.16)

## 2024-11-11 PROCEDURE — 82607 VITAMIN B-12: CPT

## 2024-11-11 PROCEDURE — 82746 ASSAY OF FOLIC ACID SERUM: CPT

## 2024-11-11 PROCEDURE — 85730 THROMBOPLASTIN TIME PARTIAL: CPT | Performed by: EMERGENCY MEDICINE

## 2024-11-11 PROCEDURE — 83540 ASSAY OF IRON: CPT

## 2024-11-11 PROCEDURE — 82728 ASSAY OF FERRITIN: CPT

## 2024-11-11 PROCEDURE — 84439 ASSAY OF FREE THYROXINE: CPT | Performed by: EMERGENCY MEDICINE

## 2024-11-11 PROCEDURE — 86850 RBC ANTIBODY SCREEN: CPT | Performed by: EMERGENCY MEDICINE

## 2024-11-11 PROCEDURE — 80053 COMPREHEN METABOLIC PANEL: CPT | Performed by: EMERGENCY MEDICINE

## 2024-11-11 PROCEDURE — 84443 ASSAY THYROID STIM HORMONE: CPT | Performed by: EMERGENCY MEDICINE

## 2024-11-11 PROCEDURE — 36415 COLL VENOUS BLD VENIPUNCTURE: CPT | Performed by: EMERGENCY MEDICINE

## 2024-11-11 PROCEDURE — 99223 1ST HOSP IP/OBS HIGH 75: CPT | Performed by: INTERNAL MEDICINE

## 2024-11-11 PROCEDURE — 86900 BLOOD TYPING SEROLOGIC ABO: CPT | Performed by: EMERGENCY MEDICINE

## 2024-11-11 PROCEDURE — 99284 EMERGENCY DEPT VISIT MOD MDM: CPT

## 2024-11-11 PROCEDURE — 82306 VITAMIN D 25 HYDROXY: CPT

## 2024-11-11 PROCEDURE — 85610 PROTHROMBIN TIME: CPT | Performed by: EMERGENCY MEDICINE

## 2024-11-11 PROCEDURE — 85025 COMPLETE CBC W/AUTO DIFF WBC: CPT | Performed by: EMERGENCY MEDICINE

## 2024-11-11 PROCEDURE — 86901 BLOOD TYPING SEROLOGIC RH(D): CPT | Performed by: EMERGENCY MEDICINE

## 2024-11-11 PROCEDURE — 70450 CT HEAD/BRAIN W/O DYE: CPT

## 2024-11-11 PROCEDURE — 83550 IRON BINDING TEST: CPT

## 2024-11-11 PROCEDURE — 86905 BLOOD TYPING RBC ANTIGENS: CPT

## 2024-11-11 PROCEDURE — 99285 EMERGENCY DEPT VISIT HI MDM: CPT | Performed by: EMERGENCY MEDICINE

## 2024-11-11 PROCEDURE — 96365 THER/PROPH/DIAG IV INF INIT: CPT

## 2024-11-11 PROCEDURE — 70490 CT SOFT TISSUE NECK W/O DYE: CPT

## 2024-11-11 PROCEDURE — 86870 RBC ANTIBODY IDENTIFICATION: CPT | Performed by: EMERGENCY MEDICINE

## 2024-11-11 RX ORDER — PANTOPRAZOLE SODIUM 40 MG/10ML
40 INJECTION, POWDER, LYOPHILIZED, FOR SOLUTION INTRAVENOUS
Status: DISCONTINUED | OUTPATIENT
Start: 2024-11-12 | End: 2024-11-13 | Stop reason: HOSPADM

## 2024-11-11 RX ORDER — SODIUM CHLORIDE, SODIUM GLUCONATE, SODIUM ACETATE, POTASSIUM CHLORIDE, MAGNESIUM CHLORIDE, SODIUM PHOSPHATE, DIBASIC, AND POTASSIUM PHOSPHATE .53; .5; .37; .037; .03; .012; .00082 G/100ML; G/100ML; G/100ML; G/100ML; G/100ML; G/100ML; G/100ML
100 INJECTION, SOLUTION INTRAVENOUS CONTINUOUS
Status: DISCONTINUED | OUTPATIENT
Start: 2024-11-11 | End: 2024-11-13 | Stop reason: HOSPADM

## 2024-11-11 RX ORDER — LORATADINE ORAL 5 MG/5ML
10 SOLUTION ORAL
Status: DISCONTINUED | OUTPATIENT
Start: 2024-11-12 | End: 2024-11-13 | Stop reason: HOSPADM

## 2024-11-11 RX ORDER — LORATADINE ORAL 5 MG/5ML
10 SOLUTION ORAL
Status: DISCONTINUED | OUTPATIENT
Start: 2024-11-12 | End: 2024-11-11

## 2024-11-11 RX ADMIN — SODIUM CHLORIDE, SODIUM LACTATE, POTASSIUM CHLORIDE, AND CALCIUM CHLORIDE 1000 ML: .6; .31; .03; .02 INJECTION, SOLUTION INTRAVENOUS at 17:11

## 2024-11-11 RX ADMIN — SODIUM CHLORIDE, SODIUM GLUCONATE, SODIUM ACETATE, POTASSIUM CHLORIDE, MAGNESIUM CHLORIDE, SODIUM PHOSPHATE, DIBASIC, AND POTASSIUM PHOSPHATE 100 ML/HR: .53; .5; .37; .037; .03; .012; .00082 INJECTION, SOLUTION INTRAVENOUS at 23:39

## 2024-11-11 NOTE — H&P
H&P - Hospitalist   Name: Cate Peña 49 y.o. female I MRN: [id number]  Unit/Bed#: ED-37 I Date of Admission: 11/11/2024   Date of Service: 11/11/2024 I Hospital Day: 0     Assessment & Plan  Esophageal dysphagia  49-year-old female with progressive dysphagia for the past week since head strike.  Had negative CT head and negative CT neck.  Previous EGD unrevealing and negative for eosinophilic esophagitis or stricture.  Due to her solar urticaria, she was reluctant to pursue colonoscopy.  We discussed that this may be the next test to determine if there is underlying pathology.  She reports having both trouble initiating swallowing and completing swallowing.    Labs on admission are unremarkable except for elevated TSH of 7.9.  She also has mild microcytic anemia likely related to iron deficiency.  Per outpatient ENT note, potential autoimmune etiology.  Will reconsult while inpatient.    Plan:  Check vitamin D and vitamin C.  Consult ENT and speech pathology.  Also consult GI to determine if there are further recommendations.  Will start her on IV PPI.  Will also consult nutrition.  Hypothyroidism due to Hashimoto's thyroiditis  Patient with known Hashimoto's thyroiditis.  She reported possible thyroid storm during pregnancy in her past.  She has not been taking her levothyroxine due to dysphagia recently.  TSH on admission 7.9.  Weight loss, unintentional  See plan for esophageal dysphagia above.  Would consult nutrition as her BMI is less than 15.  Iron deficiency anemia  Hemoglobin 10.8 on admission.  MCV 78 indicating microcytic anemia.    RDW 16.4 indicating iron deficiency  Will workup with iron panel.  Solar urticaria  Patient reported solar urticaria is a limiting factor to getting further workup for dysphagia.  She has hesitancy about colonoscopy regarding this issue.  We will need to have a further discussion with her about the pros and cons of this study.  We may need to give her antihistamine prior to  study if agreeable.    VTE Pharmacologic Prophylaxis: VTE Score: 1 Low Risk (Score 0-2) - Encourage Ambulation.  Code Status: Level 1 - Full Code   Discussion with family: Updated  () at bedside.    Anticipated Length of Stay: Patient will be admitted on an inpatient basis with an anticipated length of stay of greater than 2 midnights secondary to dysphagia.    History of Present Illness   Chief Complaint: Trouble swallowing after head strike    Cate Peña is a 49 y.o. female with a PMH of solar urticaria, asthma, hypothyroidism, depression and anxiety, iron deficiency anemia who presents with progressive dysphagia since head strike one week ago.  Patient initially reports having dysphagia after COVID infection 4 years ago.  She had progressive weight loss during this time and alter her diet significantly to thin to thick liquids.  Her current diet consists mostly of ice cream and [place] readymade meals.  She had been followed by GI with negative workup.  Was advised to be evaluated in the ED for dysphagia and failure to thrive.  She reports having difficulty getting records completed due to solar urticaria for which she takes daily Claritin.  She had an upper endoscopy by GI which did not show any evidence for stricture or eosinophilic esophagitis.    Review of Systems   Constitutional:  Positive for appetite change.   HENT:  Positive for trouble swallowing.    Eyes:  Positive for photophobia.   Respiratory:  Positive for cough.    Cardiovascular:  Positive for palpitations.   Gastrointestinal: Negative.    Musculoskeletal: Negative.    Skin: Negative.    Neurological: Negative.    Psychiatric/Behavioral:  The patient is nervous/anxious.        Historical Information   Past Medical History:   Diagnosis Date    Costochondritis     Deviated septum 2023    left-with spur    Fibroid, uterine     Food poisoning 2023    Hiatal hernia 07/2024    sliding    Ovarian cyst     Seasonal allergies      Thyroid disease     Vitamin D deficiency      Past Surgical History:   Procedure Laterality Date    EGD  07/2024    WISDOM TOOTH EXTRACTION       Social History     Tobacco Use    Smoking status: Never    Smokeless tobacco: Never   Vaping Use    Vaping status: Never Used   Substance and Sexual Activity    Alcohol use: No    Drug use: Never    Sexual activity: Not on file     E-Cigarette/Vaping    E-Cigarette Use Never User      E-Cigarette/Vaping Substances    Nicotine No     THC No     CBD No     Flavoring No     Other No     Unknown No        Social History:  Marital Status: Single   Occupation: Presenter  Patient Pre-hospital Living Situation: Home  Patient Pre-hospital Level of Mobility: walks  Patient Pre-hospital Diet Restrictions: Liquids    Meds/Allergies   I have reviewed home medications with patient personally.  Prior to Admission medications    Medication Sig Start Date End Date Taking? Authorizing Provider   albuterol (PROVENTIL HFA,VENTOLIN HFA) 90 mcg/act inhaler Inhale 2 puffs every 6 (six) hours as needed 10/9/23   Historical Provider, MD   ascorbic acid (VITAMIN C) 250 mg tablet Take 1,000 mg by mouth daily  Patient not taking: Reported on 4/5/2024    Historical Provider, MD   EPINEPHrine (EPIPEN) 0.3 mg/0.3 mL SOAJ Inject 0.3 mL (0.3 mg total) into a muscle if needed for anaphylaxis 8/22/24   Bharti Ross, DO   ergocalciferol (VITAMIN D2) 50,000 units  12/3/21   Historical Provider, MD   famotidine (PEPCID) 20 mg/2.5 mL oral suspension Take 2.5 mL (20 mg total) by mouth 2 (two) times a day 7/3/24   Azam Dumont MD   loratadine 5 mg/5 mL syrup Take 10 mg by mouth in the morning    Historical Provider, MD   sucralfate (CARAFATE) 1 g/10 mL suspension Take 10 mL (1 g total) by mouth 4 (four) times a day (with meals and at bedtime) 7/3/24 8/2/24  Azam Dumont MD   Synthroid 75 MCG tablet Take 1 tablet 6 days a week and no tab on Sunday. 12/27/22   Anna Marie Hanks MD   Tirosint-SOL 75 MCG/ML  SOLN 75 mcg liquid orally daily 11/18/22   Anna Marie Hanks MD     Allergies   Allergen Reactions    Metronidazole Anxiety, Dizziness, Palpitations and Shortness Of Breath    Other Anaphylaxis, Hives, Shortness Of Breath, Itching, Photosensitivity, Nausea Only, Swelling, Anxiety, Palpitations, Rash, Other (See Comments), Tinnitus, GI Intolerance, Dizziness, Headache, Tachycardia, Confusion, Chest Pain, Fatigue, Irritability, Syncope, Drowsiness and Nasal Congestion     Fluorescent and halogen lights     *Solar Urticaria- Sun and    *Pressure Urticaria    *Pesticides    Strawberry (Diagnostic) - Food Allergy Dermatitis, Hives, Itching, Rash and Shortness Of Breath    Sulfites - Food Allergy Anaphylaxis, Hives and Shortness Of Breath    Adhesive  [Medical Tape] Hives    American Cockroach Other (See Comments)    Cat Hair Extract Other (See Comments)    Dog Epithelium (Canis Lupus Familiaris) Other (See Comments)    Latex Hives    Penicillin G     Pollen Extract Other (See Comments)    Shellfish-Derived Products - Food Allergy Other (See Comments)    Shrimp Extract Allergy Skin Test - Food Allergy Other (See Comments)       Objective :  Temp:  [97.9 °F (36.6 °C)] 97.9 °F (36.6 °C)  HR:  [82-98] 82  BP: (107-137)/(54-63) 107/54  Resp:  [16-18] 18  SpO2:  [98 %-99 %] 98 %  O2 Device: None (Room air)    Physical Exam  Vitals and nursing note reviewed.   Constitutional:       General: She is not in acute distress.     Appearance: Normal appearance.      Comments: Cachectic   HENT:      Head: Normocephalic and atraumatic.   Eyes:      Conjunctiva/sclera: Conjunctivae normal.   Cardiovascular:      Rate and Rhythm: Normal rate and regular rhythm.      Heart sounds: No murmur heard.     Comments: Frequent PVCs  Pulmonary:      Effort: Pulmonary effort is normal. No respiratory distress.      Breath sounds: Normal breath sounds.   Abdominal:      General: Abdomen is flat. Bowel sounds are normal. There is no distension.       Palpations: Abdomen is soft.      Tenderness: There is no abdominal tenderness. There is no guarding.   Musculoskeletal:      Right lower leg: No edema.      Left lower leg: No edema.   Skin:     General: Skin is warm and dry.      Coloration: Skin is pale.   Neurological:      General: No focal deficit present.      Mental Status: She is alert. Mental status is at baseline.              Lab Results: I have reviewed the following results:  Results from last 7 days   Lab Units 11/11/24  1427   WBC Thousand/uL 8.49   HEMOGLOBIN g/dL 10.8*   HEMATOCRIT % 36.0   PLATELETS Thousands/uL 427*   SEGS PCT % 73   LYMPHO PCT % 16   MONO PCT % 9   EOS PCT % 1     Results from last 7 days   Lab Units 11/11/24  1427   SODIUM mmol/L 137   POTASSIUM mmol/L 3.6   CHLORIDE mmol/L 101   CO2 mmol/L 26   BUN mg/dL 8   CREATININE mg/dL 0.75   ANION GAP mmol/L 10   CALCIUM mg/dL 9.6   ALBUMIN g/dL 4.5   TOTAL BILIRUBIN mg/dL 0.41   ALK PHOS U/L 55   ALT U/L 15   AST U/L 15   GLUCOSE RANDOM mg/dL 105     Results from last 7 days   Lab Units 11/11/24  1427   INR  0.99         Lab Results   Component Value Date    HGBA1C 5.7 (H) 12/27/2023    HGBA1C 5.5 11/15/2022    HGBA1C 5.7 (H) 09/10/2021                 Administrative Statements       ** Please Note: This note has been constructed using a voice recognition system. **

## 2024-11-11 NOTE — TELEPHONE ENCOUNTER
"Pt calling from ED to ask if there is anything Dr. Owusu or Dr. Coreas can do to help her get into a room sooner. She states she is wating in the ED and is \"severely allergic to light\" and is having a hard time waiting for a bed.  Advised will make provider aware.      "

## 2024-11-11 NOTE — ED PROVIDER NOTES
Time reflects when diagnosis was documented in both MDM as applicable and the Disposition within this note       Time User Action Codes Description Comment    11/11/2024  4:15 PM Yne Duran [R62.7] Adult failure to thrive     11/11/2024  5:47 PM Yen Duran [R13.10] Dysphagia           ED Disposition       ED Disposition   Admit    Condition   Stable    Date/Time   Mon Nov 11, 2024  4:15 PM    Comment   --             Assessment & Plan       Medical Decision Making  This is a 49-year-old female presenting to the ED today for failure to thrive.  Patient also sustained a head trauma approximately 1 week ago, and has been having worsening dysphagia.  Patient denies any loss of consciousness, new focal weakness numbness or tingling, visual abnormalities, hearing abnormalities or any other significantly associated symptoms.  Upon arrival to the ED she is poorly, and visibly anxious, but is without any acute evidence of trauma throughout her head, neck, body.  Her differential diagnosis includes: Acute intracranial bleed versus skull fracture versus acute exacerbation of chronic dysphagia versus other.  Patient requested to receive a CT of the neck without contrast, due to a concern for contrast allergy.  I did inform her that this would be a suboptimal exam.  Patient did have a CT, CT of her head, showing no significant acute abnormalities.  She had blood work which was for the most part unremarkable.  She had slight anemia, microcytic, likely iron deficiency due to her malnutrition.  I discussed her case with ENT, whom did not have any acute requirements for her condition, however did except to help manage her case, and intervene if needed while she was hospitalized and underwent a medical admission for her failure to thrive.  I spoke with hospitalist about hospitalizing her onto their service and they excepted without any further orders requested.    Wt Readings from Last 3 Encounters:  10/08/24 : 40.8 kg (90  lb)  08/21/24 : 39.9 kg (88 lb)  07/24/24 : 39.9 kg (88 lb)       Amount and/or Complexity of Data Reviewed  External Data Reviewed: labs, radiology, ECG and notes.  Labs: ordered. Decision-making details documented in ED Course.  Radiology: ordered.    Risk  Decision regarding hospitalization.        ED Course as of 11/11/24 2150   Mon Nov 11, 2024   1614 Patient concerned about receiving contrast, states that she suspect she has a contrast allergy, however has not been tested for it, has never had a reaction.  I did discuss with patient that without contrast is a suboptimal study, may not show us serious illness if there is one present, and she is willing to take these risks.  Order changed to without contrast.   1628 TSH 3RD GENERATON(!): 7.961  Not taking her levothyroxine due to her dysphagia       Medications   Loratadine (CLARITIN) oral soln 10 mg (has no administration in time range)   pantoprazole (PROTONIX) injection 40 mg (has no administration in time range)   lactated ringers bolus 1,000 mL (0 mL Intravenous Stopped 11/11/24 1918)       ED Risk Strat Scores                           SBIRT 22yo+      Flowsheet Row Most Recent Value   Initial Alcohol Screen: US AUDIT-C     1. How often do you have a drink containing alcohol? 0 Filed at: 11/11/2024 1700   2. How many drinks containing alcohol do you have on a typical day you are drinking?  0 Filed at: 11/11/2024 1700   3b. FEMALE Any Age, or MALE 65+: How often do you have 4 or more drinks on one occassion? 0 Filed at: 11/11/2024 1700   Audit-C Score 0 Filed at: 11/11/2024 1700   CHARLEY: How many times in the past year have you...    Used an illegal drug or used a prescription medication for non-medical reasons? Never Filed at: 11/11/2024 1700                            History of Present Illness       Chief Complaint   Patient presents with    Failure To Thrive     Pt reports she is down to 90 lbs. Pt reports she is to be admitted to hospital for a week of  testing.        Past Medical History:   Diagnosis Date    Asthma     Costochondritis     Deviated septum 2023    left-with spur    Disease of thyroid gland     Fibroid, uterine     Food poisoning 2023    Hiatal hernia 07/2024    sliding    Ovarian cyst     Psychiatric disorder     anxiety/ depression    Seasonal allergies     Thyroid disease     Vitamin D deficiency       Past Surgical History:   Procedure Laterality Date    EGD  07/2024    WISDOM TOOTH EXTRACTION        Family History   Problem Relation Age of Onset    Hypertension Mother     Mitral valve prolapse Mother     Pulmonary fibrosis Mother     Heart disease Father     Hyperlipidemia Father     No Known Problems Sister     Hypothyroidism Maternal Grandmother     Cancer Maternal Grandmother         uterine, ovarian, colon, primary unclear in 40s    Colon cancer Maternal Grandmother     Cancer Maternal Grandfather         bladder cancer in 60s    Uterine cancer Paternal Grandmother         onset in 60s    Supraventricular tachycardia Daughter     Heart disease Daughter         Saldivar disease    No Known Problems Maternal Aunt     Hypothyroidism Paternal Aunt     Abdominal aortic aneurysm Paternal Aunt     Breast cancer Paternal Aunt         dx 60s    Breast cancer Other         dx 60s    Colon polyps Neg Hx       Social History     Tobacco Use    Smoking status: Never    Smokeless tobacco: Never   Vaping Use    Vaping status: Never Used   Substance Use Topics    Alcohol use: No    Drug use: Never      E-Cigarette/Vaping    E-Cigarette Use Never User       E-Cigarette/Vaping Substances    Nicotine No     THC No     CBD No     Flavoring No     Other No     Unknown No       I have reviewed and agree with the history as documented.     This is a 49-year-old female without any significantly related past medical history presenting to the ED today for a complaint of failure to thrive.  Patient has had difficulty gaining weight for quite some time, for the past 4  "years she has been stuck around 90 pounds.  Patient states that she, 1 week ago hit her head on a towel isela, and since then has been having worsening dysphagia.  She did not lose consciousness.  She has not had any focal weakness, and has chronic generalized numbness which is unchanged.  She has not had any visual abnormalities, hearing abnormalities, though she has had trouble with focusing.      Of note: She has what she states is a severe light allergy, and because she cannot go out into the light, was told by her ENT, GI doctor that they would like to directly admit her for scopes and further workup of her condition.  Patient states that they have not actually scheduled for direct admission.  She denies any vomiting, however states that she has felt some worsening reflux and a \"gurgling\" in her esophagus which has been more pronounced since she hit her head 1 week ago.  She denies any fever chills or sweats, diarrhea, constipation, abdominal pain, nausea or vomiting, or any other significantly associated symptoms.        Review of Systems   Constitutional:  Negative for activity change, chills and fever.   HENT:  Negative for congestion and rhinorrhea.    Eyes:  Negative for photophobia and visual disturbance.   Respiratory:  Negative for cough, chest tightness and shortness of breath.    Cardiovascular:  Negative for chest pain and leg swelling.   Gastrointestinal:  Negative for abdominal distention, nausea and vomiting.        Dysphagia   Genitourinary:  Negative for dysuria and frequency.   Musculoskeletal:  Negative for back pain and neck stiffness.   Skin:  Negative for rash and wound.   Neurological:  Negative for dizziness and weakness.   Psychiatric/Behavioral:  Negative for agitation and suicidal ideas.            Objective       ED Triage Vitals   Temperature Pulse Blood Pressure Respirations SpO2 Patient Position - Orthostatic VS   11/11/24 1244 11/11/24 1242 11/11/24 1242 11/11/24 1242 11/11/24 1242 " 11/11/24 1242   97.9 °F (36.6 °C) 98 137/63 16 99 % Sitting      Temp Source Heart Rate Source BP Location FiO2 (%) Pain Score    11/11/24 1244 11/11/24 1659 11/11/24 1242 -- 11/11/24 1242    Oral Monitor Right arm  3      Vitals      Date and Time Temp Pulse SpO2 Resp BP Pain Score FACES Pain Rating User   11/11/24 2100 -- pt refused 86 99 % 18 129/58 3 -- CB   11/11/24 1659 -- 82 98 % 18 107/54 No Pain -- SE   11/11/24 1244 97.9 °F (36.6 °C) -- -- -- -- -- -- RLN   11/11/24 1242 -- 98 99 % 16 137/63 3 -- RLN            Physical Exam  Vitals and nursing note reviewed.   Constitutional:       General: She is not in acute distress.     Appearance: Normal appearance. She is ill-appearing (Chronically ill-appearing, cachectic).      Comments: Poorly kempt     HENT:      Head: Normocephalic and atraumatic.      Right Ear: External ear normal.      Left Ear: External ear normal.      Nose: Nose normal. No congestion or rhinorrhea.      Mouth/Throat:      Mouth: Mucous membranes are moist.      Pharynx: Oropharynx is clear. No oropharyngeal exudate.   Eyes:      General: No scleral icterus.     Conjunctiva/sclera: Conjunctivae normal.      Comments: Patient wearing glasses.     Cardiovascular:      Rate and Rhythm: Normal rate and regular rhythm.      Pulses: Normal pulses.      Heart sounds: Normal heart sounds. No murmur heard.     No gallop.   Pulmonary:      Effort: Pulmonary effort is normal. No respiratory distress.      Breath sounds: Normal breath sounds. No stridor. No wheezing or rhonchi.   Abdominal:      General: Abdomen is flat. Bowel sounds are normal. There is no distension.      Palpations: Abdomen is soft. There is no mass.      Tenderness: There is no abdominal tenderness.   Musculoskeletal:         General: No swelling or tenderness. Normal range of motion.      Cervical back: Normal range of motion and neck supple. No tenderness.      Right lower leg: No edema.      Left lower leg: No edema.   Skin:     " General: Skin is warm and dry.      Capillary Refill: Capillary refill takes less than 2 seconds.      Coloration: Skin is not jaundiced.      Findings: No bruising.   Neurological:      General: No focal deficit present.      Mental Status: She is alert and oriented to person, place, and time. Mental status is at baseline.      Sensory: No sensory deficit.      Motor: No weakness.   Psychiatric:         Behavior: Behavior normal.         Thought Content: Thought content normal.         Judgment: Judgment normal.      Comments: Anxious mood,         Results Reviewed       Procedure Component Value Units Date/Time    T4, free [[id number]]  (Normal) Collected: 11/11/24 1427    Lab Status: Final result Specimen: Blood from Arm, Left Updated: 11/11/24 2028     Free T4 0.67 ng/dL     Narrative:        \"Therapeutic range for patients medicated with thyroid disorders: 0.61-1.24 ng/dL.\"    Vitamin D 25 hydroxy [[id number]] Collected: 11/11/24 2017    Lab Status: In process Specimen: Blood from Arm, Left Updated: 11/11/24 2024    Vitamin B12 [[id number]] Collected: 11/11/24 2017    Lab Status: In process Specimen: Blood from Arm, Left Updated: 11/11/24 2024    Folate [[id number]] Collected: 11/11/24 2017    Lab Status: In process Specimen: Blood from Arm, Left Updated: 11/11/24 2024    TSH, 3rd generation with Free T4 reflex [[id number]]  (Abnormal) Collected: 11/11/24 1427    Lab Status: Final result Specimen: Blood from Arm, Left Updated: 11/11/24 1623     TSH 3RD GENERATON 7.961 uIU/mL     Protime-INR [[id number]]  (Normal) Collected: 11/11/24 1427    Lab Status: Final result Specimen: Blood from Arm, Left Updated: 11/11/24 1516     Protime 13.8 seconds      INR 0.99    Narrative:      INR Therapeutic Range    Indication                                             INR Range      Atrial Fibrillation                                               2.0-3.0  Hypercoagulable State                                    2.0.2.3  Left " Ventricular Asist Device                            2.0-3.0  Mechanical Heart Valve                                  -    Aortic(with afib, MI, embolism, HF, LA enlargement,    and/or coagulopathy)                                     2.0-3.0 (2.5-3.5)     Mitral                                                             2.5-3.5  Prosthetic/Bioprosthetic Heart Valve               2.0-3.0  Venous thromboembolism (VTE: VT, PE        2.0-3.0    APTT [[id number]]  (Normal) Collected: 11/11/24 1427    Lab Status: Final result Specimen: Blood from Arm, Left Updated: 11/11/24 1516     PTT 28 seconds     Comprehensive metabolic panel [[id number]] Collected: 11/11/24 1427    Lab Status: Final result Specimen: Blood from Arm, Left Updated: 11/11/24 1506     Sodium 137 mmol/L      Potassium 3.6 mmol/L      Chloride 101 mmol/L      CO2 26 mmol/L      ANION GAP 10 mmol/L      BUN 8 mg/dL      Creatinine 0.75 mg/dL      Glucose 105 mg/dL      Calcium 9.6 mg/dL      AST 15 U/L      ALT 15 U/L      Alkaline Phosphatase 55 U/L      Total Protein 7.9 g/dL      Albumin 4.5 g/dL      Total Bilirubin 0.41 mg/dL      eGFR 94 ml/min/1.73sq m     Narrative:      [employer] guidelines for Chronic Kidney Disease (CKD):     Stage 1 with normal or high GFR (GFR > 90 mL/min/1.73 square meters)    Stage 2 Mild CKD (GFR = 60-89 mL/min/1.73 square meters)    Stage 3A Moderate CKD (GFR = 45-59 mL/min/1.73 square meters)    Stage 3B Moderate CKD (GFR = 30-44 mL/min/1.73 square meters)    Stage 4 Severe CKD (GFR = 15-29 mL/min/1.73 square meters)    Stage 5 End Stage CKD (GFR <15 mL/min/1.73 square meters)  Note: GFR calculation is accurate only with a steady state creatinine    CBC and differential [[id number]]  (Abnormal) Collected: 11/11/24 1427    Lab Status: Final result Specimen: Blood from Arm, Left Updated: 11/11/24 1448     WBC 8.49 Thousand/uL      RBC 4.62 Million/uL      Hemoglobin 10.8 g/dL      Hematocrit 36.0 %       MCV 78 fL      MCH 23.4 pg      MCHC 30.0 g/dL      RDW 16.4 %      MPV 9.1 fL      Platelets 427 Thousands/uL      nRBC 0 /100 WBCs      Segmented % 73 %      Immature Grans % 0 %      Lymphocytes % 16 %      Monocytes % 9 %      Eosinophils Relative 1 %      Basophils Relative 1 %      Absolute Neutrophils 6.21 Thousands/µL      Absolute Immature Grans 0.03 Thousand/uL      Absolute Lymphocytes 1.36 Thousands/µL      Absolute Monocytes 0.76 Thousand/µL      Eosinophils Absolute 0.05 Thousand/µL      Basophils Absolute 0.08 Thousands/µL             CT head without contrast   Final Interpretation by Jennie Mahajan MD (11/11 1704)      No acute intracranial CT abnormality.                  Workstation performed: [id number]         CT soft tissue neck wo contrast   Final Interpretation by Jennie Mahajan MD (11/11 1703)      Unremarkable CT of the neck within the limits of the lack of IV contrast.               Workstation performed: [id number]             CriticalCare Time    Date/Time: 11/11/2024 9:49 PM    Performed by: Yen Duran MD  Authorized by: Yen Duran MD    Critical care provider statement:     Critical care time (minutes):  40    Critical care time was exclusive of:  Separately billable procedures and treating other patients and teaching time    Critical care was necessary to treat or prevent imminent or life-threatening deterioration of the following conditions:  Circulatory failure and metabolic crisis    Critical care was time spent personally by me on the following activities:  Ordering and performing treatments and interventions, ordering and review of laboratory studies, re-evaluation of patient's condition, ordering and review of radiographic studies, review of old charts, discussions with consultants and development of treatment plan with patient or surrogate      ED Medication and Procedure Management   Prior to Admission Medications   Prescriptions Last Dose Informant Patient  Reported? Taking?   EPINEPHrine (EPIPEN) 0.3 mg/0.3 mL SOAJ More than a month  No No   Sig: Inject 0.3 mL (0.3 mg total) into a muscle if needed for anaphylaxis   Synthroid 75 MCG tablet Not Taking  No No   Sig: Take 1 tablet 6 days a week and no tab on .   Patient not taking: Reported on 2024   Tirosint-SOL 75 MCG/ML SOLN Not Taking  No No   Si mcg liquid orally daily   Patient not taking: Reported on 2024   albuterol (PROVENTIL HFA,VENTOLIN HFA) 90 mcg/act inhaler More than a month  Yes No   Sig: Inhale 2 puffs every 6 (six) hours as needed   ascorbic acid (VITAMIN C) 250 mg tablet   Yes No   Sig: Take 1,000 mg by mouth daily   Patient not taking: Reported on 2024   ergocalciferol (VITAMIN D2) 50,000 units  Self Yes No   Patient not taking: Reported on 12/10/2021   famotidine (PEPCID) 20 mg/2.5 mL oral suspension   No No   Sig: Take 2.5 mL (20 mg total) by mouth 2 (two) times a day   loratadine 5 mg/5 mL syrup Past Week  Yes Yes   Sig: Take 10 mg by mouth in the morning   sucralfate (CARAFATE) 1 g/10 mL suspension   No No   Sig: Take 10 mL (1 g total) by mouth 4 (four) times a day (with meals and at bedtime)      Facility-Administered Medications: None     Current Discharge Medication List        CONTINUE these medications which have NOT CHANGED    Details   loratadine 5 mg/5 mL syrup Take 10 mg by mouth in the morning      albuterol (PROVENTIL HFA,VENTOLIN HFA) 90 mcg/act inhaler Inhale 2 puffs every 6 (six) hours as needed      ascorbic acid (VITAMIN C) 250 mg tablet Take 1,000 mg by mouth daily      EPINEPHrine (EPIPEN) 0.3 mg/0.3 mL SOAJ Inject 0.3 mL (0.3 mg total) into a muscle if needed for anaphylaxis  Qty: 0.6 mL, Refills: 11    Associated Diagnoses: Solar urticaria      ergocalciferol (VITAMIN D2) 50,000 units       famotidine (PEPCID) 20 mg/2.5 mL oral suspension Take 2.5 mL (20 mg total) by mouth 2 (two) times a day  Qty: 150 mL, Refills: 1    Associated Diagnoses:  Gastroesophageal reflux disease without esophagitis      sucralfate (CARAFATE) 1 g/10 mL suspension Take 10 mL (1 g total) by mouth 4 (four) times a day (with meals and at bedtime)  Qty: 1200 mL, Refills: 0    Associated Diagnoses: Gastroesophageal reflux disease without esophagitis      Synthroid 75 MCG tablet Take 1 tablet 6 days a week and no tab on Sunday.  Qty: 26 tablet, Refills: 0    Associated Diagnoses: Hypothyroidism due to Hashimoto's thyroiditis      Tirosint-SOL 75 MCG/ML SOLN 75 mcg liquid orally daily  Qty: 30 mL, Refills: 6    Associated Diagnoses: Hypothyroidism due to Hashimoto's thyroiditis           No discharge procedures on file.  ED SEPSIS DOCUMENTATION   Time reflects when diagnosis was documented in both MDM as applicable and the Disposition within this note       Time User Action Codes Description Comment    11/11/2024  4:15 PM Yen Duran [R62.7] Adult failure to thrive     11/11/2024  5:47 PM Yen Duran [R13.10] Dysphagia                  Yen Duran MD  11/11/24 8124

## 2024-11-11 NOTE — TELEPHONE ENCOUNTER
"  Pt calling in to keep Dr. Owusu updated. She hit her head on a towel rack a few days ago and has symptoms of head pressure, little lightheadedness and a \"cracking in head and crunching in neck\" She thinks she may have a dent on her head but not sure. I did advise if she is concerned with a concussion she should be evaluated asap for this.     Pt was planning with ENT to have a hospital admission to have testing done but wanted to gain weight first and went from 88 lbs to 94 lbs but did have a little set back from hitting her head.   Her swallowing has worsened a little since this- coughing up oral intake, nausea and snoring at night.    She is going to ED today and hoping Dr. Coreas can place order for inpat admission while she is there. She could not get in touch with ENT today but did send mychart message.    "

## 2024-11-12 ENCOUNTER — APPOINTMENT (INPATIENT)
Dept: CT IMAGING | Facility: HOSPITAL | Age: 49
DRG: 391 | End: 2024-11-12
Payer: COMMERCIAL

## 2024-11-12 VITALS
TEMPERATURE: 97.9 F | BODY MASS INDEX: 15.27 KG/M2 | OXYGEN SATURATION: 99 % | DIASTOLIC BLOOD PRESSURE: 52 MMHG | RESPIRATION RATE: 18 BRPM | HEART RATE: 82 BPM | SYSTOLIC BLOOD PRESSURE: 104 MMHG | WEIGHT: 94.6 LBS

## 2024-11-12 PROBLEM — E43 SEVERE PROTEIN-CALORIE MALNUTRITION (HCC): Status: ACTIVE | Noted: 2024-11-12

## 2024-11-12 LAB
25(OH)D3 SERPL-MCNC: 12.9 NG/ML (ref 30–100)
FERRITIN SERPL-MCNC: 5 NG/ML (ref 11–307)
FOLATE SERPL-MCNC: 6.3 NG/ML
IRON SATN MFR SERPL: 9 % (ref 15–50)
IRON SERPL-MCNC: 38 UG/DL (ref 50–212)
TIBC SERPL-MCNC: 415 UG/DL (ref 250–450)
UIBC SERPL-MCNC: 377 UG/DL (ref 155–355)
VIT B12 SERPL-MCNC: 241 PG/ML (ref 180–914)

## 2024-11-12 PROCEDURE — 92610 EVALUATE SWALLOWING FUNCTION: CPT

## 2024-11-12 PROCEDURE — 99232 SBSQ HOSP IP/OBS MODERATE 35: CPT | Performed by: INTERNAL MEDICINE

## 2024-11-12 PROCEDURE — 99222 1ST HOSP IP/OBS MODERATE 55: CPT | Performed by: INTERNAL MEDICINE

## 2024-11-12 RX ORDER — FERROUS SULFATE 325(65) MG
325 TABLET ORAL
Qty: 30 TABLET | Refills: 0 | Status: SHIPPED | OUTPATIENT
Start: 2024-11-13

## 2024-11-12 RX ORDER — FERROUS SULFATE 325(65) MG
325 TABLET ORAL
Status: DISCONTINUED | OUTPATIENT
Start: 2024-11-13 | End: 2024-11-13 | Stop reason: HOSPADM

## 2024-11-12 RX ORDER — FERROUS SULFATE 7.5 MG/0.5
300 SYRINGE (EA) ORAL DAILY
Status: CANCELLED | OUTPATIENT
Start: 2024-11-12

## 2024-11-12 RX ORDER — ERGOCALCIFEROL 1.25 MG/1
50000 CAPSULE, LIQUID FILLED ORAL WEEKLY
Status: DISCONTINUED | OUTPATIENT
Start: 2024-11-12 | End: 2024-11-13 | Stop reason: HOSPADM

## 2024-11-12 RX ORDER — LEVOTHYROXINE SODIUM 75 UG/1
75 TABLET ORAL
Status: DISCONTINUED | OUTPATIENT
Start: 2024-11-13 | End: 2024-11-13 | Stop reason: HOSPADM

## 2024-11-12 RX ADMIN — LORATADINE 10 MG: 5 SOLUTION ORAL at 07:42

## 2024-11-12 NOTE — ASSESSMENT & PLAN NOTE
Patient with known Hashimoto's thyroiditis.  She reported possible thyroid storm during pregnancy in her past.  She has not been taking her levothyroxine due to dysphagia recently.  TSH on admission 7.9.    Plan:  Continue PTA levothyroxine

## 2024-11-12 NOTE — ASSESSMENT & PLAN NOTE
Patient reported solar urticaria is a limiting factor to getting further workup for dysphagia.  She has hesitancy about colonoscopy regarding this issue.  We will need to have a further discussion with her about the pros and cons of this study.  We may need to give her antihistamine prior to study if agreeable.

## 2024-11-12 NOTE — ASSESSMENT & PLAN NOTE
Patient reported solar urticaria is a limiting factor to getting further workup for dysphagia.  She has hesitancy about colonoscopy regarding this issue.

## 2024-11-12 NOTE — ASSESSMENT & PLAN NOTE
Hemoglobin 10.8 on admission.  MCV 78 indicating microcytic anemia.    RDW 16.4 indicating iron deficiency  Will workup with iron panel.

## 2024-11-12 NOTE — MALNUTRITION/BMI
This medical record reflects one or more clinical indicators suggestive of malnutrition and/or morbid obesity.    Malnutrition Findings:   Adult Malnutrition type: Acute illness  Adult Degree of Malnutrition: Other severe protein calorie malnutrition  Malnutrition Characteristics: Fat loss, Muscle loss, Inadequate energy                360 Statement: Severe protein-calorie malnutrition related to inadequate oral intake, dysphagia as evidenced by < 50% or estimated nutritional needs for > 5 days, loss of subcutaneous fat and muscle extremities, BMI 15.27. Patient currently NPO    BMI Findings:  Adult BMI Classifications: Underweight < 18.5        Body mass index is 15.27 kg/m².     See Nutrition note dated 11/12/2024 for additional details.  Completed nutrition assessment is viewable in the nutrition documentation.

## 2024-11-12 NOTE — CONSULTS
Consultation -  [facility]  Cate Peña 49 y.o. female MRN: [id number]  Unit/Bed#: -01 Encounter: [id number]            Reason for Consult / Principal Problem:     Adult failure to thrive, dysphagia      ASSESSMENT AND PLAN:    Dysphagia  Loss of weight  Failure to thrive  49-year-old female with progressive dysphagia for the past week since head strike.  Had negative CT head and negative CT neck.  Patient has had longstanding difficulty swallowing since being diagnosed with COVID 4 years ago but has noticed progression since the head strike 1 week ago  Previous EGD unrevealing and negative for eosinophilic esophagitis or stricture.  Due to her solar urticaria, she was reluctant to pursue colonoscopy or further workup.  She reports difficulty swallowing in the neck area as well as in the esophageal area with VBS over the summer showing distal stasis in lower esophagus and delayed clearance with retrograde flow noted  -Will order barium esophagram, patient will consider getting 13 mm tablet for further evaluation as suggested per speech recommendations  -I spoke with speech and they are going to evaluate her, patient can resume full liquid diet once they clear her  -Will keep n.p.o. after midnight for barium esophagram, also will order CT of abdomen and pelvis to evaluate for any underlying malignancy, again I told her this would be better with contrast-enhanced study but does have history of allergies so would like to hold off on any contrast  -Will need colonoscopy at some point for further evaluation of the weight loss but obviously this is not the cause of patient's dysphagia  -Weight loss may be related to restrictive diet and she is only consuming full liquid diet at home  -Dysphagia may also have a psychogenic component as she does have hx of anxiety    Further recommendations will be made pending her course.  Thank you for the consultation.  Case will be discussed with Dr. Camargo.            ______________________________________________________________________    HPI:    49 y.o. female with a PMH of solar urticaria, asthma, hypothyroidism, depression and anxiety, iron deficiency anemia who presents with progressive dysphagia since head strike one week ago.  Patient initially reports having dysphagia after COVID infection 4 years ago.  She had progressive weight loss during this time and alter her diet significantly to thin to thick liquids.  Her current diet consists mostly of ice cream and [place] readymade meals.  She had EGD in July of this year which showed 3 cm hiatal hernia and the esophagus and duodenum appeared normal, biopsies were performed which were unremarkable for celiac disease, no evidence of intestinal metaplasia in the esophagus or eosinophilic esophagitis.  Patient was seen by us last month and was recommended to consider esophageal manometry to evaluate for motility disorder if persistent issues.  Patient also noted to have iron deficiency anemia and EGD was negative and colonoscopy was recommended for this as well as weight loss but she was hesitant to have this performed.  Patient had barium swallow ordered which was not performed, she had MBS performed, apparently patient was hesitant to take larger bites of food, did have gross esophageal screen which did show distal stasis and delayed clearance with retrograde flow noted.  Patient was noted to have normal oral and pharyngeal stage of swallow at that time. They recommended barium esophagram with 13mm tablet.  Patient also was seen by ENT for the same.  Patient reports that ENT thought that either she had myasthenia gravis or porphyria and they told her that she should get admitted and have extensive testing done.  She does not really have any upper GI symptoms but does state that she has some wet burping at times but states that her diet is only consistent basically with dairy.  She eats 2 pints of ice cream daily  and she is afraid that if she is here that she will lose more weight.  She was supposed to get a colonoscopy as well as a CT of her abdomen but she states she has multiple allergies and therefore she does not want to get any contrast and she states that she also never got her barium esophagram as recommended.  She states that she was having difficulty swallowing pills and she has a solar urticaria and she was unable to take her Claritin because she no longer could swallow any of her pills due to progressive dysphagia symptoms.  She reports that her family member had MALS so abdominal Dopplers were ordered previously but not yet done but really denies any significant abdominal pain but does report tenderness now but states that she has her menses.          REVIEW OF SYSTEMS:    CONSTITUTIONAL: Denies any fever, chills, rigors, and weight loss.  HEENT: No earache or tinnitus. Denies hearing loss or visual disturbances.  CARDIOVASCULAR: No chest pain or palpitations.   RESPIRATORY: Denies any cough, hemoptysis, shortness of breath or dyspnea on exertion.  GASTROINTESTINAL: As noted in the History of Present Illness.   GENITOURINARY: No problems with urination. Denies any hematuria or dysuria.  NEUROLOGIC: No dizziness or vertigo, denies headaches.   MUSCULOSKELETAL: Denies any muscle or joint pain.   SKIN: Denies skin rashes or itching.   ENDOCRINE: Denies excessive thirst. Denies intolerance to heat or cold.  PSYCHOSOCIAL: Denies depression or anxiety. Denies any recent memory loss.       Historical Information   Past Medical History:   Diagnosis Date    Asthma     Costochondritis     Deviated septum 2023    left-with spur    Disease of thyroid gland     Fibroid, uterine     Food poisoning 2023    Hiatal hernia 07/2024    sliding    Ovarian cyst     Psychiatric disorder     anxiety/ depression    Seasonal allergies     Thyroid disease     Vitamin D deficiency      Past Surgical History:   Procedure Laterality Date     EGD  07/2024    WISDOM TOOTH EXTRACTION       Social History   Social History     Substance and Sexual Activity   Alcohol Use No     Social History     Substance and Sexual Activity   Drug Use Never     Social History     Tobacco Use   Smoking Status Never   Smokeless Tobacco Never     Family History   Problem Relation Age of Onset    Hypertension Mother     Mitral valve prolapse Mother     Pulmonary fibrosis Mother     Heart disease Father     Hyperlipidemia Father     No Known Problems Sister     Hypothyroidism Maternal Grandmother     Cancer Maternal Grandmother         uterine, ovarian, colon, primary unclear in 40s    Colon cancer Maternal Grandmother     Cancer Maternal Grandfather         bladder cancer in 60s    Uterine cancer Paternal Grandmother         onset in 60s    Supraventricular tachycardia Daughter     Heart disease Daughter         [place] disease    No Known Problems Maternal Aunt     Hypothyroidism Paternal Aunt     Abdominal aortic aneurysm Paternal Aunt     Breast cancer Paternal Aunt         dx 60s    Breast cancer Other         dx 60s    Colon polyps Neg Hx        Meds/Allergies       Medications Prior to Admission:     loratadine 5 mg/5 mL syrup    albuterol (PROVENTIL HFA,VENTOLIN HFA) 90 mcg/act inhaler    ascorbic acid (VITAMIN C) 250 mg tablet    EPINEPHrine (EPIPEN) 0.3 mg/0.3 mL SOAJ    ergocalciferol (VITAMIN D2) 50,000 units    famotidine (PEPCID) 20 mg/2.5 mL oral suspension    sucralfate (CARAFATE) 1 g/10 mL suspension    Synthroid 75 MCG tablet    Tirosint-SOL 75 MCG/ML SOLN    Current Facility-Administered Medications:     Loratadine (CLARITIN) oral soln 10 mg, Early Morning    multi-electrolyte (PLASMALYTE-A/ISOLYTE-S PH 7.4) IV solution, Continuous, Last Rate: 100 mL/hr (11/11/24 1053)    pantoprazole (PROTONIX) injection 40 mg, Q24H HAZEL    Allergies   Allergen Reactions    Metronidazole Anxiety, Dizziness, Palpitations and Shortness Of Breath    Other Anaphylaxis, Hives,  Shortness Of Breath, Itching, Photosensitivity, Nausea Only, Swelling, Anxiety, Palpitations, Rash, Other (See Comments), Tinnitus, GI Intolerance, Dizziness, Headache, Tachycardia, Confusion, Chest Pain, Fatigue, Irritability, Syncope, Drowsiness and Nasal Congestion     Fluorescent and halogen lights     *Solar Urticaria- Sun and    *Pressure Urticaria    *Pesticides    Strawberry (Diagnostic) - Food Allergy Dermatitis, Hives, Itching, Rash and Shortness Of Breath    Sulfites - Food Allergy Anaphylaxis, Hives and Shortness Of Breath    Adhesive  [Medical Tape] Hives    American Cockroach Other (See Comments)    Cat Hair Extract Other (See Comments)    Dog Epithelium (Canis Lupus Familiaris) Other (See Comments)    Latex Hives    Penicillin G     Pollen Extract Other (See Comments)    Shellfish-Derived Products - Food Allergy Other (See Comments)    Shrimp Extract Allergy Skin Test - Food Allergy Other (See Comments)           Objective     Blood pressure 112/53, pulse 94, temperature 97.9 °F (36.6 °C), temperature source Oral, resp. rate 17, weight 42.9 kg (94 lb 9.6 oz), last menstrual period 08/01/2024, SpO2 99%. Body mass index is 15.27 kg/m².      Intake/Output Summary (Last 24 hours) at 11/12/2024 0859  Last data filed at 11/12/2024 0855  Gross per 24 hour   Intake 0 ml   Output 0 ml   Net 0 ml         PHYSICAL EXAM:      General Appearance:   Alert, cooperative, no distress   HEENT:   Normocephalic, atraumatic, anicteric.     Neck:  Supple, symmetrical, trachea midline   Lungs:   Clear to auscultation bilaterally; no rales, rhonchi or wheezing; respirations unlabored    Heart::   Regular rate and rhythm; no murmur, rub, or gallop.   Abdomen:   Soft, non-tender, non-distended; normal bowel sounds; no masses, no r/r/g   Genitalia:   Deferred    Rectal:   Deferred    Extremities:  No cyanosis, clubbing or edema    Pulses:  2+ and symmetric all extremities    Skin:  No jaundice, rashes, or lesions    Lymph  nodes:  No palpable cervical lymphadenopathy        Lab Results:   Admission on 11/11/2024   Component Date Value    WBC 11/11/2024 8.49     RBC 11/11/2024 4.62     Hemoglobin 11/11/2024 10.8 (L)     Hematocrit 11/11/2024 36.0     MCV 11/11/2024 78 (L)     MCH 11/11/2024 23.4 (L)     MCHC 11/11/2024 30.0 (L)     RDW 11/11/2024 16.4 (H)     MPV 11/11/2024 9.1     Platelets 11/11/2024 427 (H)     nRBC 11/11/2024 0     Segmented % 11/11/2024 73     Immature Grans % 11/11/2024 0     Lymphocytes % 11/11/2024 16     Monocytes % 11/11/2024 9     Eosinophils Relative 11/11/2024 1     Basophils Relative 11/11/2024 1     Absolute Neutrophils 11/11/2024 6.21     Absolute Immature Grans 11/11/2024 0.03     Absolute Lymphocytes 11/11/2024 1.36     Absolute Monocytes 11/11/2024 0.76     Eosinophils Absolute 11/11/2024 0.05     Basophils Absolute 11/11/2024 0.08     Sodium 11/11/2024 137     Potassium 11/11/2024 3.6     Chloride 11/11/2024 101     CO2 11/11/2024 26     ANION GAP 11/11/2024 10     BUN 11/11/2024 8     Creatinine 11/11/2024 0.75     Glucose 11/11/2024 105     Calcium 11/11/2024 9.6     AST 11/11/2024 15     ALT 11/11/2024 15     Alkaline Phosphatase 11/11/2024 55     Total Protein 11/11/2024 7.9     Albumin 11/11/2024 4.5     Total Bilirubin 11/11/2024 0.41     eGFR 11/11/2024 94     Protime 11/11/2024 13.8     INR 11/11/2024 0.99     PTT 11/11/2024 28     ABO Grouping 11/11/2024 B     Rh Factor 11/11/2024 Positive     Antibody Screen 11/11/2024 Positive     Specimen Expiration Date 11/11/2024 20241114     TSH 3RD GENERATON 11/11/2024 7.961 (H)     ANTIBODY ID. #1 11/11/2024 Cold Auto Antibody     ANTIBODY ID. #2 11/11/2024 Anti-E     E ANTIGEN 11/11/2024 Negative     Free T4 11/11/2024 0.67     Vitamin B-12 11/11/2024 241     Folate 11/11/2024 6.3     Vit D, 25-Hydroxy 11/11/2024 12.9 (L)        Imaging Studies: CT head without contrast    Result Date: 11/11/2024  Narrative: CT BRAIN - WITHOUT CONTRAST INDICATION:    HEAD TRAUMA. COMPARISON:  None. TECHNIQUE:  CT examination of the brain was performed.  Multiplanar 2D reformatted images were created from the source data. Radiation dose length product (DLP) for this visit:  1141 mGy-cm .  This examination, like all CT scans performed in the [facility], was performed utilizing techniques to minimize radiation dose exposure, including the use of iterative reconstruction and automated exposure control. IMAGE QUALITY:  Diagnostic. FINDINGS: PARENCHYMA:No intracranial mass, mass effect or midline shift. No CT signs of acute infarction.  No acute parenchymal hemorrhage. VENTRICLES AND EXTRA-AXIAL SPACES:  Normal for the patient's age. VISUALIZED ORBITS: Normal visualized orbits. PARANASAL SINUSES: Normal visualized paranasal sinuses. CALVARIUM AND EXTRACRANIAL SOFT TISSUES: No acute osseous abnormality. Cerumen/secretions within bilateral external auditory canals (severe on the right and mild on the left.     Impression: No acute intracranial CT abnormality. Workstation performed: [id number]     CT soft tissue neck wo contrast    Result Date: 11/11/2024  Narrative: CT SOFT TISSUE NECK WITHOUT CONTRAST INDICATION:   dysphagia, suspect contrast allergy. Head/neck trauma. COMPARISON:  None. TECHNIQUE:  Axial, sagittal, and coronal 2D reformatted images were created from the axial source data and submitted for interpretation. Radiation dose length product (DLP) for this visit:  556 mGy-cm .  This examination, like all CT scans performed in the [facility], was performed utilizing techniques to minimize radiation dose exposure, including the use of iterative reconstruction and automated exposure control. IMAGE QUALITY:  Diagnostic. FINDINGS: VISUALIZED BRAIN PARENCHYMA:  Normal visualized brain parenchyma. VISUALIZED ORBITS: Normal visualized orbits. PARANASAL SINUSES: Normal visualized paranasal sinuses. NASAL CAVITY AND NASOPHARYNX:  Normal.  SUPRAHYOID NECK: Oral cavity is grossly unremarkable. Small left palatine tonsillolith. Normal epiglottis. INFRAHYOID NECK:  Aryepiglottic folds and piriform sinuses appear unremarkable. Normal larynx and subglottic airway. THYROID GLAND: Limited assessment without IV contrast. PAROTID AND SUBMANDIBULAR GLANDS: Normal. LYMPH NODES: No cervical lymphadenopathy by size criteria. VASCULAR STRUCTURES:  Limited without contrast. THORACIC INLET: Biapical pleural-parenchymal atelectasis/scarring (right greater than left) BONY STRUCTURES: There is thoracocervical dextroscoliosis. No acute osseous abnormality. Cerumen/secretions within bilateral external auditory canals (severe on the right and mild on the left)     Impression: Unremarkable CT of the neck within the limits of the lack of IV contrast. Workstation performed: [id number]

## 2024-11-12 NOTE — PLAN OF CARE
Problem: PAIN - ADULT  Goal: Verbalizes/displays adequate comfort level or baseline comfort level  Description: Interventions:  - Encourage patient to monitor pain and request assistance  - Assess pain using appropriate pain scale  - Administer analgesics based on type and severity of pain and evaluate response  - Implement non-pharmacological measures as appropriate and evaluate response  - Consider cultural and social influences on pain and pain management  - Notify physician/advanced practitioner if interventions unsuccessful or patient reports new pain  Outcome: Progressing     Problem: INFECTION - ADULT  Goal: Absence or prevention of progression during hospitalization  Description: INTERVENTIONS:  - Assess and monitor for signs and symptoms of infection  - Monitor lab/diagnostic results  - Monitor all insertion sites, i.e. indwelling lines, tubes, and drains  - Monitor endotracheal if appropriate and nasal secretions for changes in amount and color  - [place] appropriate cooling/warming therapies per order  - Administer medications as ordered  - Instruct and encourage patient and family to use good hand hygiene technique  - Identify and instruct in appropriate isolation precautions for identified infection/condition  Outcome: Progressing     Problem: SAFETY ADULT  Goal: Patient will remain free of falls  Description: INTERVENTIONS:  - Educate patient/family on patient safety including physical limitations  - Instruct patient to call for assistance with activity   - Consult OT/PT to assist with strengthening/mobility   - Keep Call bell within reach  - Keep bed low and locked with side rails adjusted as appropriate  - Keep care items and personal belongings within reach  - Initiate and maintain comfort rounds  - Make Fall Risk Sign visible to staff  - Apply yellow socks and bracelet for high fall risk patients  - Consider moving patient to room near nurses station  Outcome: Progressing

## 2024-11-12 NOTE — ASSESSMENT & PLAN NOTE
49-year-old female with progressive dysphagia for the past week since head strike. Initially started 4 years ago after COVID infection.    Had negative CT head and negative CT neck.  She also seemingly had previous barium swallow studies which were negative.  Previous EGD unrevealing and negative for eosinophilic esophagitis or stricture.  Due to her solar urticaria, she was reluctant to pursue colonoscopy.  We discussed that this may be the next test to determine if there is underlying pathology.  She reports having both trouble initiating swallowing and completing swallowing. Patient had been following with dietary in the outpatient setting.    Labs on admission are unremarkable except for elevated TSH of 7.9.  She also has mild microcytic anemia likely related to iron deficiency.  Per outpatient ENT note, potential autoimmune etiology.  GI consulted and recommended repeat barium esophagram  Folate sary (6.3). B12 low normal (241). Vit D low (12.9). Ferritin low (5).    Plan:  Barium swallow study tomorrow. NPO after midnight  GI also recommending CT abdomen/pelvis.  Regular diet with thin liquids per SLP. May take her medications whole with liquids as tolerated  Recommend Vit D, B12 and iron supplementation  Continue IV PPI.

## 2024-11-12 NOTE — ASSESSMENT & PLAN NOTE
49-year-old female with progressive dysphagia for the past week since head strike.  Had negative CT head and negative CT neck.  Previous EGD unrevealing and negative for eosinophilic esophagitis or stricture.  Due to her solar urticaria, she was reluctant to pursue colonoscopy.  We discussed that this may be the next test to determine if there is underlying pathology.  She reports having both trouble initiating swallowing and completing swallowing.    Labs on admission are unremarkable except for elevated TSH of 7.9.  She also has mild microcytic anemia likely related to iron deficiency.  Per outpatient ENT note, potential autoimmune etiology.  Will reconsult while inpatient.    Plan:  Patient left AMA before further workup could be completed

## 2024-11-12 NOTE — ASSESSMENT & PLAN NOTE
Malnutrition Findings:   Adult Malnutrition type: Acute illness  Adult Degree of Malnutrition: Other severe protein calorie malnutrition  Malnutrition Characteristics: Fat loss, Muscle loss, Inadequate energy      360 Statement: Severe protein-calorie malnutrition related to inadequate oral intake, dysphagia as evidenced by < 50% or estimated nutritional needs for > 5 days, loss of subcutaneous fat and muscle extremities, BMI 15.27. Patient currently NPO    BMI Findings:  Adult BMI Classifications: Underweight < 18.5        Body mass index is 15.27 kg/m².

## 2024-11-12 NOTE — DISCHARGE INSTR - AVS FIRST PAGE
Dear Cate Peña,     It was our pleasure to care for you here at [facility].  It is our hope that we were always able to exceed the expected standards for your care during your stay.  You were hospitalized due to dysphagia.  You were cared for on the fourth floor by Matt Walker DO under the service of Aurelia Caceres MD with the [facility] who covers for your primary care physician (PCP), Arcelia Mendez MD, while you were hospitalized.  If you have any questions or concerns related to this hospitalization, you may contact us at .  For follow up as well as any medication refills, we recommend that you follow up with your primary care physician.  A registered nurse will reach out to you by phone within a few days after your discharge to answer any additional questions that you may have after going home.  However, at this time we provide for you here, the most important instructions / recommendations at discharge:       Notable Medication Adjustments -   Please take iron tablets and B12 tablets as prescribed. Please reach out to your PCP and discuss further continuation of these medications with them.   Testing Required after Discharge -   As per your PCP.   ** Please contact your PCP to request testing orders for any of the testing recommended here **  Important follow up information -   Please follow-up with your PCP within a week of discharge  Other Instructions -   Please take all your medications regularly as directed  If symptoms return/persist contact your PCP; if unable then visit to nearest ER    Please review this entire after visit summary as additional general instructions including medication list, appointments, activity, diet, any pertinent wound care, and other additional recommendations from your care team that may be provided for you.      Sincerely,   Matt Walker DO

## 2024-11-12 NOTE — ASSESSMENT & PLAN NOTE
Patient with known Hashimoto's thyroiditis.  She reported possible thyroid storm during pregnancy in her past.  She has not been taking her levothyroxine due to dysphagia recently.  TSH on admission 7.9.

## 2024-11-12 NOTE — DISCHARGE SUMMARY
Discharge Summary - Hospitalist   Name: Cate Peña 49 y.o. female I MRN: [id number]  Unit/Bed#: -01 I Date of Admission: 11/11/2024   Date of Service: 11/12/2024 I Hospital Day: 1     Assessment & Plan  Esophageal dysphagia  49-year-old female with progressive dysphagia for the past week since head strike.  Had negative CT head and negative CT neck.  Previous EGD unrevealing and negative for eosinophilic esophagitis or stricture.  Due to her solar urticaria, she was reluctant to pursue colonoscopy.  We discussed that this may be the next test to determine if there is underlying pathology.  She reports having both trouble initiating swallowing and completing swallowing.    Labs on admission are unremarkable except for elevated TSH of 7.9.  She also has mild microcytic anemia likely related to iron deficiency.  Per outpatient ENT note, potential autoimmune etiology.  Will reconsult while inpatient.    Plan:  Patient left AMA before further workup could be completed  Hypothyroidism due to Hashimoto's thyroiditis  Patient with known Hashimoto's thyroiditis.  She reported possible thyroid storm during pregnancy in her past.  She has not been taking her levothyroxine due to dysphagia recently.  TSH on admission 7.9.  Weight loss, unintentional  See plan for esophageal dysphagia above.  Iron deficiency anemia  Hemoglobin 10.8 on admission.  MCV 78 indicating microcytic anemia.    RDW 16.4 indicating iron deficiency  Solar urticaria  Patient reported solar urticaria is a limiting factor to getting further workup for dysphagia.  She has hesitancy about colonoscopy regarding this issue.  Severe protein-calorie malnutrition (HCC)  Malnutrition Findings:   Adult Malnutrition type: Acute illness  Adult Degree of Malnutrition: Other severe protein calorie malnutrition  Malnutrition Characteristics: Fat loss, Muscle loss, Inadequate energy    360 Statement: Severe protein-calorie malnutrition related to inadequate oral  intake, dysphagia as evidenced by < 50% or estimated nutritional needs for > 5 days, loss of subcutaneous fat and muscle extremities, BMI 15.27. Patient currently NPO    BMI Findings:  Adult BMI Classifications: Underweight < 18.5        Body mass index is 15.27 kg/m².      Medical Problems       Resolved Problems  Date Reviewed: 11/11/2024   None       Discharging Physician / Practitioner: Bhupinder Rucker MD  PCP: Arcelia Mendez MD  Admission Date:   Admission Orders (From admission, onward)       Ordered        11/11/24 1748  INPATIENT ADMISSION  Once                          Discharge Date: 11/12/24    Consultations During Hospital Stay:  GI, speech, ENT    Procedures Performed:   Barium swallow study    Significant Findings / Test Results:   None    Incidental Findings:   None    Test Results Pending at Discharge (will require follow up):   None     Outpatient Tests Requested:  None    Complications: None    Reason for Admission: Dysphagia    Hospital Course:   Cate Peña is a 49 y.o. female patient who originally presented to the hospital on 11/11/2024 due to dysphagia.  Had previous workup with CT head, CT neck, EGD, barium swallow study which were negative.  Was seen by ENT in the outpatient setting and was recommended to be evaluated for possible myasthenia gravis.  During this admission GI and speech were consulted.  Recommendation to repeat barium swallow study.  We also recommended that she be evaluated by psychiatrist however she declined.  We administered diet as tolerated after speech evaluation.  Patient left AMA on 11/12.     Condition at Discharge: good    Discharge Day Visit / Exam:   Subjective:    Patient left AMA before evaluation could be made by day team.    Vitals: Blood Pressure: 112/53 (11/12/24 0301)  Pulse: 94 (11/12/24 0301)  Temperature: 97.9 °F (36.6 °C) (11/11/24 1244)  Temp Source: Oral (11/11/24 1244)  Respirations: 17 (11/12/24 0301)  Weight - Scale: 42.9 kg (94 lb 9.6 oz)  (11/11/24 2100)  SpO2: 99 % (11/12/24 0301)  Physical exam could not be completed as patient left AMA    Discussion with Family:  Patient has left AMA.     Discharge instructions/Information to patient and family:   See after visit summary for information provided to patient and family.      Provisions for Follow-Up Care:  See after visit summary for information related to follow-up care and any pertinent home health orders.      Mobility at time of Discharge:   Basic Mobility Inpatient Raw Score: 24  JH-HLM Goal: 8: Walk 250 feet or more  JH-HLM Achieved: 7: Walk 25 feet or more  HLM Goal NOT achieved. Continue to encourage mobility in post discharge setting.     Disposition:   Home    Planned Readmission: No    Discharge Medications:  See after visit summary for reconciled discharge medications provided to patient and/or family.      Administrative Statements   Discharge Statement:  I have spent a total time of 30 minutes in caring for this patient on the day of the visit/encounter. .    **Please Note: This note may have been constructed using a voice recognition system**

## 2024-11-12 NOTE — PLAN OF CARE
Problem: PAIN - ADULT  Goal: Verbalizes/displays adequate comfort level or baseline comfort level  Description: Interventions:  - Encourage patient to monitor pain and request assistance  - Assess pain using appropriate pain scale  - Administer analgesics based on type and severity of pain and evaluate response  - Implement non-pharmacological measures as appropriate and evaluate response  - Consider cultural and social influences on pain and pain management  - Notify physician/advanced practitioner if interventions unsuccessful or patient reports new pain  Outcome: Progressing     Problem: INFECTION - ADULT  Goal: Absence or prevention of progression during hospitalization  Description: INTERVENTIONS:  - Assess and monitor for signs and symptoms of infection  - Monitor lab/diagnostic results  - Monitor all insertion sites, i.e. indwelling lines, tubes, and drains  - Monitor endotracheal if appropriate and nasal secretions for changes in amount and color  - [place] appropriate cooling/warming therapies per order  - Administer medications as ordered  - Instruct and encourage patient and family to use good hand hygiene technique  - Identify and instruct in appropriate isolation precautions for identified infection/condition  Outcome: Progressing  Goal: Absence of fever/infection during neutropenic period  Description: INTERVENTIONS:  - Monitor WBC    Outcome: Progressing     Problem: SAFETY ADULT  Goal: Patient will remain free of falls  Description: INTERVENTIONS:  - Educate patient/family on patient safety including physical limitations  - Instruct patient to call for assistance with activity   - Consult OT/PT to assist with strengthening/mobility   - Keep Call bell within reach  - Keep bed low and locked with side rails adjusted as appropriate  - Keep care items and personal belongings within reach  - Initiate and maintain comfort rounds  - Make Fall Risk Sign visible to staff  - Offer Toileting every  Hours,  in advance of need  - Initiate/Maintain alarm  - Obtain necessary fall risk management equipment:   - Apply yellow socks and bracelet for high fall risk patients  - Consider moving patient to room near nurses station  Outcome: Progressing  Goal: Maintain or return to baseline ADL function  Description: INTERVENTIONS:  -  Assess patient's ability to carry out ADLs; assess patient's baseline for ADL function and identify physical deficits which impact ability to perform ADLs (bathing, care of mouth/teeth, toileting, grooming, dressing, etc.)  - Assess/evaluate cause of self-care deficits   - Assess range of motion  - Assess patient's mobility; develop plan if impaired  - Assess patient's need for assistive devices and provide as appropriate  - Encourage maximum independence but intervene and supervise when necessary  - Involve family in performance of ADLs  - Assess for home care needs following discharge   - Consider OT consult to assist with ADL evaluation and planning for discharge  - Provide patient education as appropriate  Outcome: Progressing  Goal: Maintains/Returns to pre admission functional level  Description: INTERVENTIONS:  - Perform AM-PAC 6 Click Basic Mobility/ Daily Activity assessment daily.  - Set and communicate daily mobility goal to care team and patient/family/caregiver.   - Collaborate with rehabilitation services on mobility goals if consulted  - Perform Range of Motion  times a day.  - Reposition patient every  hours.  - Dangle patient  times a day  - Stand patient  times a day  - Ambulate patient times a day  - Out of bed to chair  times a day   - Out of bed for meals times a day  - Out of bed for toileting  - Record patient progress and toleration of activity level   Outcome: Progressing     Problem: DISCHARGE PLANNING  Goal: Discharge to home or other facility with appropriate resources  Description: INTERVENTIONS:  - Identify barriers to discharge w/patient and caregiver  - Arrange for  needed discharge resources and transportation as appropriate  - Identify discharge learning needs (meds, wound care, etc.)  - Arrange for interpretive services to assist at discharge as needed  - Refer to Case Management Department for coordinating discharge planning if the patient needs post-hospital services based on physician/advanced practitioner order or complex needs related to functional status, cognitive ability, or social support system  Outcome: Progressing     Problem: Knowledge Deficit  Goal: Patient/family/caregiver demonstrates understanding of disease process, treatment plan, medications, and discharge instructions  Description: Complete learning assessment and assess knowledge base.  Interventions:  - Provide teaching at level of understanding  - Provide teaching via preferred learning methods  Outcome: Progressing     Problem: NEUROSENSORY - ADULT  Goal: Achieves stable or improved neurological status  Description: INTERVENTIONS  - Monitor and report changes in neurological status  - Monitor vital signs such as temperature, blood pressure, glucose, and any other labs ordered   - Initiate measures to prevent increased intracranial pressure  - Monitor for seizure activity and implement precautions if appropriate      Outcome: Progressing  Goal: Remains free of injury related to seizures activity  Description: INTERVENTIONS  - Maintain airway, patient safety  and administer oxygen as ordered  - Monitor patient for seizure activity, document and report duration and description of seizure to physician/advanced practitioner  - If seizure occurs,  ensure patient safety during seizure  - Reorient patient post seizure  - Seizure pads on all 4 side rails  - Instruct patient/family to notify RN of any seizure activity including if an aura is experienced  - Instruct patient/family to call for assistance with activity based on nursing assessment  - Administer anti-seizure medications if ordered    Outcome:  Progressing  Goal: Achieves maximal functionality and self care  Description: INTERVENTIONS  - Monitor swallowing and airway patency with patient fatigue and changes in neurological status  - Encourage and assist patient to increase activity and self care.   - Encourage visually impaired, hearing impaired and aphasic patients to use assistive/communication devices  Outcome: Progressing     Problem: MUSCULOSKELETAL - ADULT  Goal: Maintain or return mobility to safest level of function  Description: INTERVENTIONS:  - Assess patient's ability to carry out ADLs; assess patient's baseline for ADL function and identify physical deficits which impact ability to perform ADLs (bathing, care of mouth/teeth, toileting, grooming, dressing, etc.)  - Assess/evaluate cause of self-care deficits   - Assess range of motion  - Assess patient's mobility  - Assess patient's need for assistive devices and provide as appropriate  - Encourage maximum independence but intervene and supervise when necessary  - Involve family in performance of ADLs  - Assess for home care needs following discharge   - Consider OT consult to assist with ADL evaluation and planning for discharge  - Provide patient education as appropriate  Outcome: Progressing  Goal: Maintain proper alignment of affected body part  Description: INTERVENTIONS:  - Support, maintain and protect limb and body alignment  - Provide patient/ family with appropriate education  Outcome: Progressing

## 2024-11-12 NOTE — NURSING NOTE
RN completing admission with pt. Pt refused to submit home medication to pharmacy for verification. PT was also upset with NPO diet order. Education was provided to pt, and comfort measures provided. Provider notified and pt evaluated at bedside. New orders were placed for pt. Once new orders were administered to pt, Pt refused RN bedside dysphagia screening, and morning blood work. Provider made aware.

## 2024-11-12 NOTE — UTILIZATION REVIEW
Initial Clinical Review    Admission: Date/Time/Statement:   Admission Orders (From admission, onward)       Ordered        11/11/24 1748  INPATIENT ADMISSION  Once                          Orders Placed This Encounter   Procedures    INPATIENT ADMISSION     Standing Status:   Standing     Number of Occurrences:   1     Order Specific Question:   Level of Care     Answer:   Med Surg [16]     Order Specific Question:   Estimated length of stay     Answer:   More than 2 Midnights     Order Specific Question:   Certification     Answer:   I certify that inpatient services are medically necessary for this patient for a duration of greater than two midnights. See H&P and MD Progress Notes for additional information about the patient's course of treatment.     ED Arrival Information       Expected   -    Arrival   11/11/2024 12:31    Acuity   Urgent              Means of arrival   Walk-In    Escorted by   Family Member    Service   Hospitalist    Admission type   Emergency              Arrival complaint   head injury last week, now difficulty swallowing             Chief Complaint   Patient presents with    Failure To Thrive     Pt reports she is down to 90 lbs. Pt reports she is to be admitted to hospital for a week of testing.        Initial Presentation: 49 y.o. female current BMI 15.27 direct by OP GI to ED as walk in states that she is unable to take any solid food by mouth any longer since head strike 1 wk prior. Presents for evaluation & treatment as Inpatient admission due to Esophageal  Dysphagia, Failure to Thrive.   States that sympts gradually came on worsened after a COVID infection 4 wk prior where she had increasing difficulty swallowing and now can only tolerate oral liquids. Reports having both trouble initiating swallowing and completing swallowing & not taking OP Levothyroxine due to dysphagia  PMH  Solar urticaria, anxiety, Hypothyroidism due to Hashimoto's, iron def anemia, COVID, OP GI w normal EGD,  pending OP ENT, unexplained WT loss having changed diet to thick liquids icl ice cream & [place] ready made meals    EXAM  BMI 15.27 / Cachectic, + hiatal hernia   labs HGB 10.28, MCV 78, RDW 16.4, TSH elevated 7.9;   PLAN  consult ENT, obtain VIT D&C levels, IV PPI, IVF, consult GI/ Nutrition; obtain iron panel, bedside RN dysphagia screen  Anticipated Length of Stay/Certification Statement: Patient will be admitted on an inpatient basis with an anticipated length of stay of greater than 2 midnights secondary to dysphagia. Needs ENT workup, needs GI input, needs nutritional support   Date: 11/12/2024   Day 2:   GI  Dysphagia, Loss of WT, FTT, BMI 15.27  Recommend  barium esophagram, patient will consider getting 13 mm tablet for further evaluation as suggested per speech recommendations  SPEECH to evaluate her,  resume full liquid diet once they clear her  Keep n.p.o. after midnight for barium esophagram,   & obtain  CT of abdomen and pelvis to evaluate for any underlying malignancy, again I told her this would be better with contrast-enhanced study but does have history of allergies so would like to hold off on any contrast  Will need colonoscopy at some point for further evaluation of the weight loss but obviously this is not the cause of patient's dysphagia  Weight loss may be related to restrictive diet and she is only consuming full liquid diet at home  Dysphagia may also have a psychogenic component as  hx of anxiety  Registered Dietitian  This medical record reflects one or more clinical indicators suggestive of malnutrition and/or morbid obesity.  Malnutrition Findings:   Adult Malnutrition type: Acute illness  Adult Degree of Malnutrition: Other severe protein calorie malnutrition  Malnutrition Characteristics: Fat loss, Muscle loss, Inadequate energy     360 Statement: Severe protein-calorie malnutrition related to inadequate oral intake, dysphagia as evidenced by < 50% or estimated nutritional needs  for > 5 days, loss of subcutaneous fat and muscle extremities, BMI 15.27. Patient currently NPO  BMI Findings:  Adult BMI Classifications: Underweight < 18.5  Body mass index is 15.27 kg/m².        ED Treatment-Medication Administration from 11/11/2024 1231 to 11/11/2024 2059         Date/Time Order Dose Route Action     11/11/2024 1711 lactated ringers bolus 1,000 mL 1,000 mL Intravenous New Bag            Scheduled Medications:  Loratadine, 10 mg, Oral, Early Morning  pantoprazole, 40 mg, Intravenous, Q24H HAZEL      Continuous IV Infusions:  multi-electrolyte, 100 mL/hr, Intravenous, Continuous      PRN Meds:     ED Triage Vitals   Temperature Pulse Respirations Blood Pressure SpO2 Pain Score   11/11/24 1244 11/11/24 1242 11/11/24 1242 11/11/24 1242 11/11/24 1242 11/11/24 1242   97.9 °F (36.6 °C) 98 16 137/63 99 % 3     Weight (last 2 days)       Date/Time Weight    11/11/24 2100 42.9 (94.6)            Vital Signs (last 3 days)       Date/Time Temp Pulse Resp BP MAP (mmHg) SpO2 O2 Device Patient Position - Orthostatic VS [place] Coma Scale Score Pain    11/12/24 0747 -- -- -- -- -- -- -- -- 15 No Pain    11/12/24 0301 -- 94 17 112/53 76 99 % None (Room air) Sitting -- --    11/11/24 2341 -- 69 18 114/54 78 96 % None (Room air) Sitting -- --    11/11/24 2100 -- 86 18 129/58 -- 99 % -- Sitting -- 3    11/11/24 1659 -- 82 18 107/54 -- 98 % None (Room air) Lying -- No Pain    11/11/24 1244 97.9 °F (36.6 °C) -- -- -- -- -- -- -- -- --    11/11/24 1242 -- 98 16 137/63 -- 99 % None (Room air) Sitting -- 3              Pertinent Labs/Diagnostic Test Results:   Radiology:  CT head without contrast   Final Interpretation by Jennie Mahajan MD (11/11 1704)      No acute intracranial CT abnormality.         CT soft tissue neck wo contrast   Final Interpretation by Jennie Mahajan MD (11/11 1703)      Unremarkable CT of the neck within the limits of the lack of IV contrast.         CT abdomen pelvis wo contrast    (Results Pending)  "  FL esophagram complete    (Results Pending)     Cardiology:  No orders to display     GI:  No orders to display           Results from last 7 days   Lab Units 11/11/24  1427   WBC Thousand/uL 8.49   HEMOGLOBIN g/dL 10.8*   HEMATOCRIT % 36.0   PLATELETS Thousands/uL 427*   TOTAL NEUT ABS Thousands/µL 6.21         Results from last 7 days   Lab Units 11/11/24  1427   SODIUM mmol/L 137   POTASSIUM mmol/L 3.6   CHLORIDE mmol/L 101   CO2 mmol/L 26   ANION GAP mmol/L 10   BUN mg/dL 8   CREATININE mg/dL 0.75   EGFR ml/min/1.73sq m 94   CALCIUM mg/dL 9.6     Results from last 7 days   Lab Units 11/11/24  1427   AST U/L 15   ALT U/L 15   ALK PHOS U/L 55   TOTAL PROTEIN g/dL 7.9   ALBUMIN g/dL 4.5   TOTAL BILIRUBIN mg/dL 0.41         Results from last 7 days   Lab Units 11/11/24  1427   GLUCOSE RANDOM mg/dL 105             No results found for: \"BETA-HYDROXYBUTYRATE\"                           Results from last 7 days   Lab Units 11/11/24  1427   PROTIME seconds 13.8   INR  0.99   PTT seconds 28     Results from last 7 days   Lab Units 11/11/24  1427   TSH 3RD GENERATON uIU/mL 7.961*                         Results from last 7 days   Lab Units 11/11/24  2017   FERRITIN ng/mL 5*         Past Medical History:   Diagnosis Date    Asthma     Costochondritis     Deviated septum 2023    left-with spur    Disease of thyroid gland     Fibroid, uterine     Food poisoning 2023    Hiatal hernia 07/2024    sliding    Ovarian cyst     Psychiatric disorder     anxiety/ depression    Seasonal allergies     Thyroid disease     Vitamin D deficiency      Present on Admission:   Hypothyroidism due to Hashimoto's thyroiditis   Weight loss, unintentional   Iron deficiency anemia   Esophageal dysphagia   Solar urticaria      Admitting Diagnosis: Adult failure to thrive [R62.7]  Head injury [S09.90XA]  Difficulty swallowing [R13.10]  Dysphagia [R13.10]  Age/Sex: 49 y.o. female    Network Utilization Review Department  ATTENTION: Please call with " any questions or concerns to [phone] and carefully listen to the prompts so that you are directed to the right person. All voicemails are confidential.   For Discharge needs, contact Care Management DC Support Team at [phone] opt. 2  Send all requests for admission clinical reviews, approved or denied determinations and any other requests to dedicated fax number below belonging to the campus where the patient is receiving treatment. List of dedicated fax numbers for the Facilities:  FACILITY NAME UR FAX NUMBER   ADMISSION DENIALS (Administrative/Medical Necessity) [phone]   DISCHARGE SUPPORT TEAM (NETWORK) [phone]   PARENT CHILD HEALTH (Maternity/NICU/Pediatrics) [phone]   [facility] [phone]   [facility] [phone]   [facility] [phone]   [facility] [phone]   [facility] [phone]   [facility] [phone]   [facility] [phone]   [facility] [phone]   [facility] [phone]   [facility] [phone]   [facility] [phone]   [facility] [phone]

## 2024-11-12 NOTE — SPEECH THERAPY NOTE
"Speech-Language Pathology Bedside Swallow Evaluation        Patient Name: Cate Peña    Today's Date: 11/12/2024     Problem List  Principal Problem:    Esophageal dysphagia  Active Problems:    Hypothyroidism due to Hashimoto's thyroiditis    Weight loss, unintentional    Iron deficiency anemia    Solar urticaria    Severe protein-calorie malnutrition (HCC)           Summary    Pt presents with functional appearing oral and pharyngeal swallowing skills.  ? Component of esophageal dysmotility vs psychogenic/behavioral issues influencing dysphagia symptoms/complaints.  Agree w/ barium swallow/esophagram.      Recommendations:   Diet: regular diet and thin liquids   Meds: whole with liquid and as tolerated    Frequent Oral care: 4x/day  Other Recommendations/ considerations: may need to consider TF for nutritional support; will follow up as needed       Current Medical Status  Pt is a 49 y.o. female who presented to [facility]  with esophageal dysphagia. Pt c/o gradual worsening of dysphagia w/ solids since COVID infection. Pt has had work up including EGD and VBS this past summer and seen by ENT 10/24/24 via telemedicine. A barium swallow has also been ordered but not completed as pt reported it is difficult for her to go to appts with her \"light allergy\".  Pt stated she hit her head on a towel rack recently and questioned if that could have luis something in her neck that would affect her swallowing.     Past medical history:   Please see H&P for details    Special Studies:  CT- soft tissue neck: 11/11/24 Unremarkable CT of the neck within the limits of the lack of IV contrast    EGD 7/24/24: 3 cm type I hiatal hernia;  The esophagus and duodenum appeared normal.    VBS: 8/12/24: Study was somewhat limited secondary to pt's comfort level w/ taking larger bites. Declined a whole and half pill but was agreeable to 1/4th.    Oral stage: WNL. Pt took extremely small bites and chewed all solids to a " puree-like consistency. Transferred piece-meal. Bolus control and formation were WNL.    Pharyngeal stage: WNL for velar elevation, anterior hyoid excursion, laryngeal elevation, pharyngeal constriction, epiglottic inversion, tongue base retraction, and passage through UES. Bolus size was limited however, 2* pt's comfort level. Uncertain if this may have affected study results. No penetration or aspiration. 1/4 pill in pudding passed WNL though the pt appeared anxious when swallowing and tapped her throat repeatedly. Pt asked if she aspirated.     Social/Education/Vocational Hx:  Pt lives with family    Swallow Information   Prior speech/swallowing tx: VBS in aug 2024  Current Risks for Dysphagia & Aspiration:  c/o progressive food dysphagia  Current Symptoms/Concerns:  poor po intake, c/o food dysphagia  Current Diet: NPO   Baseline Diet: regular diet and thin liquids  Takes pills- usually liquid form, admits to difficulty swallowing pills     Baseline Assessment   Behavior/Cognition: alert  Speech/Language Status: able to participate in conversation and able to follow commands  Patient Positioning: upright in bed     Swallow Mechanism Exam   Facial: symmetrical  Labial: WFL  Lingual: WFL  Velum: unable to visualize  Mandible: adequate ROM  Dentition: adequate  Vocal quality:clear/adequate   Volitional Cough: strong/productive   Respiratory: RA    Consistencies Assessed and Performance   Consistencies Administered: thin liquids, puree, and mechanical soft solids (cookie)     Oral Stage: pt took small bites of cookie, adequate bolus retrieval from spoon- small amts (1/4 tsp) and sips of thin liquids by straw. Mastication/manipulation appeared purposeful and prolonged- pt stated she needs to chew it thoroughly so it goes down. Oral control of liquids appeared WNL.     Pharyngeal Stage: swallow initiation appeared timely w/ volitional effort; secondary swallows noted w/ foods. Pt took sips of liquids after several  bites of cookie, cough noted x1 at end of session w/ sip of thin liquid after cookie.       Esophageal Concerns: belching and c/o food dysphagia symptoms      Results Reviewed with: patient and family       Mary Carmen Goldman MA CCC-SLP  Speech Pathologist  [place] license # [id number]  [place] license # [id number]  Available via Secure Chat

## 2024-11-12 NOTE — PROGRESS NOTES
Progress Note - Hospitalist   Name: Cate Peña 49 y.o. female I MRN: [id number]  Unit/Bed#: -01 I Date of Admission: 11/11/2024   Date of Service: 11/12/2024 I Hospital Day: 1    Assessment & Plan  Esophageal dysphagia  49-year-old female with progressive dysphagia for the past week since head strike. Initially started 4 years ago after COVID infection.    Had negative CT head and negative CT neck.  She also seemingly had previous barium swallow studies which were negative.  Previous EGD unrevealing and negative for eosinophilic esophagitis or stricture.  Due to her solar urticaria, she was reluctant to pursue colonoscopy.  We discussed that this may be the next test to determine if there is underlying pathology.  She reports having both trouble initiating swallowing and completing swallowing. Patient had been following with dietary in the outpatient setting.    Labs on admission are unremarkable except for elevated TSH of 7.9.  She also has mild microcytic anemia likely related to iron deficiency.  Per outpatient ENT note, potential autoimmune etiology.  GI consulted and recommended repeat barium esophagram  Folate sary (6.3). B12 low normal (241). Vit D low (12.9). Ferritin low (5).    Plan:  Barium swallow study tomorrow. NPO after midnight  GI also recommending CT abdomen/pelvis.  Regular diet with thin liquids per SLP. May take her medications whole with liquids as tolerated  Recommend Vit D, B12 and iron supplementation  Continue IV PPI.  Weight loss, unintentional  See plan for esophageal dysphagia above.  Nutrition consulted as her BMI is less than 15.  Family history of grandmother with colon cancer  We discussed the possibility of being evaluated by psychiatry.  Patient declined psychiatric evaluation at this time.  Will discuss further.    Plan:  Regular diet per SLP  Consider colonoscopy in the future per GI  Hypothyroidism due to Hashimoto's thyroiditis  Patient with known Hashimoto's  thyroiditis.  She reported possible thyroid storm during pregnancy in her past.  She has not been taking her levothyroxine due to dysphagia recently.  TSH on admission 7.9.    Plan:  Continue PTA levothyroxine   Iron deficiency anemia  Hemoglobin 10.8 on admission.  MCV 78 indicating microcytic anemia.    RDW 16.4 indicating iron deficiency  Will workup with iron panel.  Solar urticaria  Patient reported solar urticaria is a limiting factor to getting further workup for dysphagia.  She has hesitancy about colonoscopy regarding this issue.  We will need to have a further discussion with her about the pros and cons of this study.  We may need to give her antihistamine prior to study if agreeable.  Severe protein-calorie malnutrition (HCC)  Malnutrition Findings:   Adult Malnutrition type: Acute illness  Adult Degree of Malnutrition: Other severe protein calorie malnutrition  Malnutrition Characteristics: Fat loss, Muscle loss, Inadequate energy      360 Statement: Severe protein-calorie malnutrition related to inadequate oral intake, dysphagia as evidenced by < 50% or estimated nutritional needs for > 5 days, loss of subcutaneous fat and muscle extremities, BMI 15.27. Patient currently NPO    BMI Findings:  Adult BMI Classifications: Underweight < 18.5        Body mass index is 15.27 kg/m².     VTE Pharmacologic Prophylaxis: VTE Score: 1 Low Risk (Score 0-2) - Encourage Ambulation.    Mobility:   Basic Mobility Inpatient Raw Score: 24  JH-HLM Goal: 8: Walk 250 feet or more  JH-HLM Achieved: 7: Walk 25 feet or more  JH-HLM Goal NOT achieved. Continue with multidisciplinary rounding and encourage appropriate mobility to improve upon JH-HLM goals.    Patient Centered Rounds: I performed bedside rounds with nursing staff today.   Discussions with Specialists or Other Care Team Provider: Speech language pathology, gastroenterology, nutrition    Education and Discussions with Family / Patient: Updated   (mother) at bedside.    Current Length of Stay: 1 day(s)  Current Patient Status: Inpatient   Certification Statement: The patient will continue to require additional inpatient hospital stay due to pending barium swallow study for dysphagia  Discharge Plan: Anticipate discharge in 24-48 hrs to home.    Code Status: Level 1 - Full Code    Subjective   Patient examined at bedside. No acute events overnight. Patient reports not sleeping well last night but has insomnia at baseline. She reports no changes in her dysphagia symptoms. Reports chronic chest pain due to costochondritis. Denies any NVD or SOB.     Objective :  HR:  [69-94] 94  BP: (107-129)/(53-58) 112/53  Resp:  [17-18] 17  SpO2:  [96 %-99 %] 99 %  O2 Device: None (Room air)    Body mass index is 15.27 kg/m².     Input and Output Summary (last 24 hours):     Intake/Output Summary (Last 24 hours) at 11/12/2024 1418  Last data filed at 11/12/2024 0855  Gross per 24 hour   Intake 0 ml   Output 0 ml   Net 0 ml       Physical Exam  Vitals and nursing note reviewed.   Constitutional:       General: She is not in acute distress.     Appearance: Normal appearance. She is well-developed. She is not ill-appearing.   HENT:      Head: Normocephalic and atraumatic.   Eyes:      Conjunctiva/sclera: Conjunctivae normal.   Cardiovascular:      Rate and Rhythm: Normal rate and regular rhythm.      Pulses: Normal pulses.      Heart sounds: Normal heart sounds. No murmur heard.  Pulmonary:      Effort: Pulmonary effort is normal. No respiratory distress.      Breath sounds: Normal breath sounds. No wheezing.   Abdominal:      General: Bowel sounds are normal. There is no distension.      Palpations: Abdomen is soft.      Comments: Mild TTP epigastrium   Musculoskeletal:         General: No swelling.      Cervical back: Neck supple.   Skin:     General: Skin is warm and dry.      Capillary Refill: Capillary refill takes less than 2 seconds.   Neurological:      General: No  focal deficit present.      Mental Status: She is alert and oriented to person, place, and time.   Psychiatric:         Mood and Affect: Mood normal.           Lab Results: I have reviewed the following results:   Results from last 7 days   Lab Units 11/11/24  1427   WBC Thousand/uL 8.49   HEMOGLOBIN g/dL 10.8*   HEMATOCRIT % 36.0   PLATELETS Thousands/uL 427*   SEGS PCT % 73   LYMPHO PCT % 16   MONO PCT % 9   EOS PCT % 1     Results from last 7 days   Lab Units 11/11/24  1427   SODIUM mmol/L 137   POTASSIUM mmol/L 3.6   CHLORIDE mmol/L 101   CO2 mmol/L 26   BUN mg/dL 8   CREATININE mg/dL 0.75   ANION GAP mmol/L 10   CALCIUM mg/dL 9.6   ALBUMIN g/dL 4.5   TOTAL BILIRUBIN mg/dL 0.41   ALK PHOS U/L 55   ALT U/L 15   AST U/L 15   GLUCOSE RANDOM mg/dL 105     Results from last 7 days   Lab Units 11/11/24  1427   INR  0.99         Imaging Results Review: I reviewed radiology reports from this admission including: CT head and CT soft tissue neck.  Other Study Results Review: No additional pertinent studies reviewed.    Last 24 Hours Medication List:     Current Facility-Administered Medications:     Loratadine (CLARITIN) oral soln 10 mg, Early Morning    multi-electrolyte (PLASMALYTE-A/ISOLYTE-S PH 7.4) IV solution, Continuous, Last Rate: 100 mL/hr (11/11/24 5479)    pantoprazole (PROTONIX) injection 40 mg, Q24H HAZEL    Administrative Statements   Today, Patient Was Seen By: Bhupinder Rucker MD and Forrest Min MS3      **Please Note: This note may have been constructed using a voice recognition system.**

## 2024-11-12 NOTE — ASSESSMENT & PLAN NOTE
Hemoglobin 10.8 on admission.  MCV 78 indicating microcytic anemia.    RDW 16.4 indicating iron deficiency

## 2024-11-12 NOTE — NURSING NOTE
Patient stated that since she is menstruating that she's afraid she's going to have a syncopal episode from being NPO. Patient's mother came to visit her and patient stated she ate a sugar cookie. Patient was made aware again that she needs to wait for speech therapy to come and evaluate her.

## 2024-11-12 NOTE — ASSESSMENT & PLAN NOTE
See plan for esophageal dysphagia above.  Nutrition consulted as her BMI is less than 15.  Family history of grandmother with colon cancer  We discussed the possibility of being evaluated by psychiatry.  Patient declined psychiatric evaluation at this time.  Will discuss further.    Plan:  Regular diet per SLP  Consider colonoscopy in the future per GI

## 2024-11-12 NOTE — ASSESSMENT & PLAN NOTE
49-year-old female with progressive dysphagia for the past week since head strike.  Had negative CT head and negative CT neck.  Previous EGD unrevealing and negative for eosinophilic esophagitis or stricture.  Due to her solar urticaria, she was reluctant to pursue colonoscopy.  We discussed that this may be the next test to determine if there is underlying pathology.  She reports having both trouble initiating swallowing and completing swallowing.    Labs on admission are unremarkable except for elevated TSH of 7.9.  She also has mild microcytic anemia likely related to iron deficiency.  Per outpatient ENT note, potential autoimmune etiology.  Will reconsult while inpatient.    Plan:  Check vitamin D and vitamin C.  Consult ENT and speech pathology.  Also consult GI to determine if there are further recommendations.  Will start her on IV PPI.  Will also consult nutrition.

## 2024-11-13 ENCOUNTER — TRANSITIONAL CARE MANAGEMENT (OUTPATIENT)
Dept: INTERNAL MEDICINE CLINIC | Facility: CLINIC | Age: 49
End: 2024-11-13

## 2024-11-13 LAB
ANTIBODY ID. #3: NORMAL
BLOOD GROUP ANTIBODIES SERPL: NORMAL
BLOOD GROUP ANTIBODIES SERPL: NORMAL

## 2024-11-13 NOTE — UTILIZATION REVIEW
NOTIFICATION OF ADMISSION DISCHARGE   This is a Notification of Discharge from [facility]. Please be advised that this patient has been discharge from our facility. Below you will find the admission and discharge date and time including the patient’s disposition.   UTILIZATION REVIEW CONTACT:  Brenda Smith  Utilization   Network Utilization Review Department  Phone: [phone] x carefully listen to the prompts. All voicemails are confidential.  Email: [email or web address]     ADMISSION INFORMATION  PRESENTATION DATE: 11/11/2024  1:39 PM  OBERVATION ADMISSION DATE: N/A  INPATIENT ADMISSION DATE: 11/11/24  5:48 PM   DISCHARGE DATE: 11/12/2024  9:00 PM   DISPOSITION:Left against medical advice or discontinued care    Network Utilization Review Department  ATTENTION: Please call with any questions or concerns to [phone] and carefully listen to the prompts so that you are directed to the right person. All voicemails are confidential.   For Discharge needs, contact Care Management DC Support Team at [phone] opt. 2  Send all requests for admission clinical reviews, approved or denied determinations and any other requests to dedicated fax number below belonging to the campus where the patient is receiving treatment. List of dedicated fax numbers for the Facilities:  FACILITY NAME UR FAX NUMBER   ADMISSION DENIALS (Administrative/Medical Necessity) [phone]   DISCHARGE SUPPORT TEAM ([facility]) [phone]   PARENT CHILD HEALTH (Maternity/NICU/Pediatrics) [phone]   [facility] [phone]   [facility] [phone]   Critical access hospital [phone]   [facility] [phone]   [facility] [phone]   [facility] [phone]   [facility] [phone]   [facility] [phone]   [facility] [phone]   Critical access hospital [phone]   [facility] [phone]   [facility] [phone]

## 2024-11-14 NOTE — TELEPHONE ENCOUNTER
Norma from [facility] requesting we fax CT order (Dr. Owusu) and recent CMP to [phone]. Faxed.

## 2024-11-15 DIAGNOSIS — Z00.6 ENCOUNTER FOR EXAMINATION FOR NORMAL COMPARISON OR CONTROL IN CLINICAL RESEARCH PROGRAM: ICD-10-CM

## 2024-11-15 NOTE — TELEPHONE ENCOUNTER
Pt calling with several concerns:    Some of visits with dietitian have not been covered by insurance. Questioning if there is anything GI can do regarding Dx on original referral. Advised pt to speak with Nutrition as they select Dx codes for billing and to contract insurance for covered diagnosis codes. Pt agreeable.   She wants to make GI provider aware that she had every intention of completing GI test while inpatient however had a very negative experience with Hospitalist as she felt they were mislabeling her as anorexic. States she has an appetite but has difficulty swallowing. Gained 4 lbs prior to hospitalization by eating ice cream.  Requesting order for Cologuard.  Pt interested in Speech Therapy referral. Questioning if she should hold off until Barium Swallow (esophagram and manometry mentioned in GI consult note) or defer to ENT.   CT wo contrast scheduled with [facility]  Cachexia noted in chart and concerned as her research shows term used for pts at end of life.   Heavy menstruation during hospitalization unsure if this affected labs and Dx of iron deficiency anemia. Has appt with Hem Onc 12/18/24. Saw another doctor yesterday and liquid iron ordered. Concerned supplement will cause GI issues/nausea making it difficult to eat/gain weight. Wants GI thoughts on starting iron now vs trying to gain weight first.  Asking GI Provider to review hospital labs and advise if anything else is needed.     Offered visit with AP to discuss further discuss concerns however pt prefers to see Dr. Owusu and requires telemedicine appt due to light allergy.

## 2024-11-19 ENCOUNTER — TELEPHONE (OUTPATIENT)
Age: 49
End: 2024-11-19

## 2024-11-19 NOTE — TELEPHONE ENCOUNTER
Patients GI provider:  Dr. Owusu    Number to return call: [phone]    Reason for call: [facility] radiology department calling to review CT diagnosis for this pt. States diagnosis do not match the testing be preformed. Call was transferred to [facility] in triage.    Scheduled procedure/appointment date if applicable:   Apt 2/4

## 2024-11-19 NOTE — TELEPHONE ENCOUNTER
Sparkle, with [facility], transferred to myself by Meaghan.    Sparkle is requesting clarification on preferred CT ordered.    CTA AP w wo ordered by  or     CT AP wo ordered by May Clement PA-C.    Please fax correct order to: Attn Sparkle [phone]

## 2024-12-10 ENCOUNTER — TELEPHONE (OUTPATIENT)
Dept: GASTROENTEROLOGY | Facility: CLINIC | Age: 49
End: 2024-12-10

## 2024-12-10 NOTE — TELEPHONE ENCOUNTER
I called & lvm for the patient in regards to cancelling appt with Dr. Owusu on 5/16/25 due to him being unavailable. I advised the patient to call the office back to reschedule this appointment accordingly.

## 2025-01-10 ENCOUNTER — TELEPHONE (OUTPATIENT)
Age: 50
End: 2025-01-10
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
## 2025-01-13 NOTE — TELEPHONE ENCOUNTER
Called and spoke with patient.Informed her of recommendations.She verbalized understanding.Patient states she did have some GERD symptoms for a day or 2.She states she did speak with her allergist and is waiting to speak with her ENT.She is requesting a sooner virtual apt to discuss her concerns.I sent a request to  clerical to schedule patient for an overbooked apt.

## 2025-01-15 ENCOUNTER — TELEMEDICINE (OUTPATIENT)
Dept: GASTROENTEROLOGY | Facility: CLINIC | Age: 50
End: 2025-01-15
Payer: COMMERCIAL

## 2025-01-15 DIAGNOSIS — D50.9 IRON DEFICIENCY ANEMIA, UNSPECIFIED IRON DEFICIENCY ANEMIA TYPE: ICD-10-CM

## 2025-01-15 DIAGNOSIS — K21.9 GASTROESOPHAGEAL REFLUX DISEASE WITHOUT ESOPHAGITIS: Primary | ICD-10-CM

## 2025-01-15 DIAGNOSIS — E43 SEVERE PROTEIN-CALORIE MALNUTRITION (HCC): ICD-10-CM

## 2025-01-15 DIAGNOSIS — R13.19 ESOPHAGEAL DYSPHAGIA: ICD-10-CM

## 2025-01-15 DIAGNOSIS — R63.4 WEIGHT LOSS, UNINTENTIONAL: ICD-10-CM

## 2025-01-15 PROCEDURE — 99214 OFFICE O/P EST MOD 30 MIN: CPT | Performed by: INTERNAL MEDICINE

## 2025-01-16 ENCOUNTER — TELEPHONE (OUTPATIENT)
Age: 50
End: 2025-01-16

## 2025-01-16 NOTE — TELEPHONE ENCOUNTER
Pt. Calling with questions on looking for pcp , pt. Is asking if you have any recommendations on a good pcp she can get established with, she is concerned she might become diabetic and has an endocrine appointment in June and was asking if GI manages diabetes, advised we do not and she would have to see either her pcp or endocrine for  workup to diagnose or treat a diabetic condition, pt. Is just unsure who to go to for pcp and trusts Dr. Owusu and would like his recommendations

## 2025-01-20 ENCOUNTER — NURSE TRIAGE (OUTPATIENT)
Dept: OTHER | Facility: OTHER | Age: 50
End: 2025-01-20

## 2025-01-20 NOTE — TELEPHONE ENCOUNTER
"Reason for Disposition   SEVERE difficulty swallowing (e.g., drooling or spitting, can't swallow water)    Answer Assessment - Initial Assessment Questions  1. DESCRIPTION: \"Tell me more about this problem.\" \"Are you  having trouble swallowing liquids, solids, or both?\" \"Any trouble with swallowing saliva (spit)?\"      Woke up last night with a significant amount of saliva in her mouth and had a hard time swallowing the saliva. This morning still feels as if saliva is pooling in her mouth. Denies difficulty breathing. Denies constricted feeling in throat but states she just \"feels a pulling\" sensation in her throat that she typically feels with her dysphagia. Is speaking with no difficulty. Pt is afraid of taking her morning medications and afraid of swallowing liquids. Also woke up this morning with symptoms of reflux.     2. SEVERITY: \"How bad is the swallowing difficulty?\"  (Scale 1-10; or mild, moderate, severe)      At least moderate    3. ONSET: \"When did the swallowing problems begin?\"       Occurred last night    4. CAUSE: \"What do you think is causing the problem?\"  (e.g., dry mouth, food or pill stuck in throat, mouth pain, sore throat, progression of disease process such as dementia or Parkinson's disease).       Dysphagia    5. CHRONIC or RECURRENT: \"Is this a new problem for you?\"  If No, ask: \"How long have you had this problem?\" (e.g., days, weeks, months)       Yes    6. OTHER SYMPTOMS: \"Do you have any other symptoms?\" (e.g., chest pain, difficulty breathing, mouth sores, sore throat, swollen tongue, chest pain)      O2 96-97% hr , BP 120s Systolic    Protocols used: Swallowing Difficulty-Adult-      Recommended ED evaluation since pt is still feeling saliva pooling in throat. States that ED will not do anything for her and is declining ED dispo. She is asking if she should take her morning [place]? States she needs to take due to allergy to light that could potentially cause anaphylaxis. "     Paged GI Specialist via [place] for guidance.     Per Edgar Amaral- have not seen pt in the office in sometime (other then virtually). Because of this and because pt is still feeling sensation in throat would advise against drinking anything at this moment and would recommend going to the ED as well.     Recommendation given to pt once again. Pt again declines ED dispo. States the ED will do nothing for her and will just send her back home. Pt states she is looking for a referral to Neurology or to see a sleep specialist. Please contact pt back to discuss.

## 2025-01-20 NOTE — TELEPHONE ENCOUNTER
"Regarding: Guidance / difficulty swallowing  ----- Message from Sharda LOPEZ sent at 1/20/2025  7:37 AM EST -----  \"I am looking to speak with a nurse for some guidance. I have Dysphasia and Hypomotility (where my food goes down slowly). Last night I experienced a unusual symptom. I drank my evening protein shake around 9/10PM it was about 12OZ and then 8OZ of water. I then fell asleep within 30 minutes of drinking the liquid.  I woke up shortly after I fell asleep with an overwhelming amount of saliva in my mouth that was not going down. I couldn't fall back asleep because It was taking a voluntary effort to swallow back the excess saliva.   My question is - I have to take my liquid allergy medication this morning but I'm a little nervous to drink anything with what happened last night\"    "

## 2025-01-24 NOTE — TELEPHONE ENCOUNTER
Pt called with follow up questions regarding hydration. States urine is sheyla colored. Pt has not been able to hydrate as she typically does as she slept for approx 15 hrs after ED visit. Advised increase fluids/electrolytes as tolerated. Reviewed alarm symptoms that necessitate ED evaluation. Pt verbalized understanding.

## 2025-02-03 ENCOUNTER — TELEPHONE (OUTPATIENT)
Age: 50
End: 2025-02-03

## 2025-02-03 NOTE — TELEPHONE ENCOUNTER
Patients GI provider:  Dr. Owusu    Number to return call: [phone]    Reason for call: Rhonda from [facility] dietitian calling to update . Pt was seen on Friday 1/31/2025. PT is very malnourished, difficulty gaining weight, Sodium labs are low, only tolerating soft foods. Rhonda wanted to make note that Tube feeding is something she would like to be considered.    Scheduled procedure/appointment date if applicable: Apt 2/4/2025

## 2025-02-04 ENCOUNTER — TELEMEDICINE (OUTPATIENT)
Dept: GASTROENTEROLOGY | Facility: CLINIC | Age: 50
End: 2025-02-04
Payer: COMMERCIAL

## 2025-02-04 VITALS — HEIGHT: 66 IN | BODY MASS INDEX: 15.27 KG/M2

## 2025-02-04 DIAGNOSIS — R13.19 ESOPHAGEAL DYSPHAGIA: ICD-10-CM

## 2025-02-04 DIAGNOSIS — R63.4 WEIGHT LOSS, UNINTENTIONAL: ICD-10-CM

## 2025-02-04 DIAGNOSIS — K21.9 GASTROESOPHAGEAL REFLUX DISEASE WITHOUT ESOPHAGITIS: Primary | ICD-10-CM

## 2025-02-04 DIAGNOSIS — D50.9 IRON DEFICIENCY ANEMIA, UNSPECIFIED IRON DEFICIENCY ANEMIA TYPE: ICD-10-CM

## 2025-02-04 DIAGNOSIS — E43 SEVERE PROTEIN-CALORIE MALNUTRITION (HCC): ICD-10-CM

## 2025-02-04 PROCEDURE — 98006 SYNCH AUDIO-VIDEO EST MOD 30: CPT | Performed by: INTERNAL MEDICINE

## 2025-02-04 NOTE — PROGRESS NOTES
"Virtual Regular Visit  Name: Cate Peña      : 1975      MRN: [id number]  Encounter Provider: Jer Owusu MD  Encounter Date: 2025   [facility]      Verification of patient location:  Patient is located at {Amb Virtual Patient Location:[address]} in the following state in which I hold an active license { amb virtual patient location:[address]} :Assessment & Plan        Encounter provider Jer Owusu MD    The patient was identified by name and date of birth. Cate Peña was informed that this is a telemedicine visit and that the visit is being conducted through {AMB VIRTUAL VISIT MEDIUM:30624}.  {Telemedicine confidentiality :40399} {Telemedicine participants:06651}  She acknowledged consent and understanding of privacy and security of the video platform. The patient has agreed to participate and understands they can discontinue the visit at any time.    Patient is aware this is a billable service.     History of Present Illness {?Quick Links Encounters * My Last Note * Last Note in Specialty * Snapshot * Since Last Visit * History :75895}    HPI  Review of Systems    Objective {?Quick Links Trend Vitals * Enter New Vitals * Results Review * Timeline (Adult) * Labs * Imaging * Cardiology * Procedures * Lung Cancer Screening * Surgical eConsent :99171}  Ht 5' 6\" (1.676 m)   BMI 15.27 kg/m²     Physical Exam    Visit Time  Total Visit Duration: ***  "

## 2025-02-04 NOTE — PROGRESS NOTES
Virtual Regular Visit  Name: Cate Peña      : 1975      MRN: [id number]  Encounter Provider: Jer Owusu MD  Encounter Date: 1/15/2025   Encounter department: [facility] [place]      Verification of patient location:  Patient is located at Home in the following state in which I hold an active license [place] :  Assessment & Plan  Gastroesophageal reflux disease without esophagitis  She has reflux and dysphagia symptoms but there is no evidence of oropharyngeal dysphagia on her video swallow study and her endoscopy did not show a stricture or obstruction.  She has had difficulty gaining weight and has a very low body mass index so I have asked her to continue to follow-up with nutrition.  I also recommend she considers supplemental nutrition given the severity of her malnutrition.       Esophageal dysphagia  See above       Iron deficiency anemia, unspecified iron deficiency anemia type  She has iron deficiency anemia and weight loss so I have recommended colonoscopy multiple times but she is not ready to commit to this.  We will discuss again at her next visit.       Weight loss, unintentional  See above       Severe protein-calorie malnutrition (HCC)  Malnutrition Findings:    See above                             BMI Findings:   See above        There is no height or weight on file to calculate BMI.                Encounter provider Jer Owusu MD    The patient was identified by name and date of birth. Cate Peña was informed that this is a telemedicine visit and that the visit is being conducted through the Epic Embedded platform. She agrees to proceed..  My office door was closed. No one else was in the room.  She acknowledged consent and understanding of privacy and security of the video platform. The patient has agreed to participate and understands they can discontinue the visit at any time.    Patient is aware this is a billable service.     History of Present Illness      Yasmany Peña is a 49 y.o. female who continues to have difficulty gaining weight despite seeing a nutritionist and working with a nutritionist.  Since her last visit she has not noticed a significant change in her weight.  She has nausea but denies vomiting.  She has not had any recent constipation or diarrhea.  She continues to have mild intermittent reflux and dysphagia.  She plans to see a hematologist to discussed iron infusions or oral liquid iron supplementation.  Her upper endoscopy on July 24, 2024 was notable only for a 3 cm sliding hiatal hernia but otherwise unremarkable.   Review of Systems   Constitutional:  Positive for unexpected weight change. Negative for chills, fatigue and fever.   HENT:  Negative for trouble swallowing.    Eyes:  Negative for visual disturbance.   Respiratory:  Negative for cough and shortness of breath.    Cardiovascular:  Negative for chest pain.   Gastrointestinal:  Negative for abdominal distention, abdominal pain, blood in stool, constipation, diarrhea, nausea and vomiting.   Musculoskeletal:  Negative for arthralgias, gait problem and myalgias.   Skin:  Negative for pallor and rash.   Neurological:  Negative for dizziness, weakness and headaches.   Hematological:  Negative for adenopathy. Does not bruise/bleed easily.   Psychiatric/Behavioral:  Negative for dysphoric mood. The patient is nervous/anxious.        Objective   There were no vitals taken for this visit.    Physical Exam  Constitutional:       Appearance: She is ill-appearing.   Skin:     Coloration: Skin is not jaundiced.      Findings: No rash.         Visit Time  Total Visit Duration: 20

## 2025-02-04 NOTE — ASSESSMENT & PLAN NOTE
Malnutrition Findings:                                 BMI Findings:           There is no height or weight on file to calculate BMI.

## 2025-02-04 NOTE — PROGRESS NOTES
Virtual Regular Visit  Name: Cate Peña      : 1975      MRN: [id number]  Encounter Provider: Jer Owusu MD  Encounter Date: 2025   Encounter department: [facility] [place]      Verification of patient location:  Patient is located at Home in the following state in which I hold an active license [place] :    Gastroesophageal reflux disease without esophagitis  She has reflux and dysphagia symptoms but has not had any recent symptoms.  I encouraged her to continue to chew her food well before swallowing and let me know if she has any worsening of the symptoms.       Esophageal dysphagia  See above       Weight loss, unintentional  She has weight loss, severe malnutrition, and iron deficiency anemia.  I recommended a colonoscopy again but she is still not ready to commit to this.  We will discuss again at her next visit.  I also suggested she consider supplemental nutrition with tube feeding and she is considering this as well.       Iron deficiency anemia, unspecified iron deficiency anemia type  See above             Encounter provider Jer Owusu MD    The patient was identified by name and date of birth. Cate Peña was informed that this is a telemedicine visit and that the visit is being conducted through the Epic Embedded platform. She agrees to proceed..  My office door was closed. No one else was in the room.  She acknowledged consent and understanding of privacy and security of the video platform. The patient has agreed to participate and understands they can discontinue the visit at any time.    Patient is aware this is a billable service.     History of Present Illness     HPI Cate Peña is a 49 y.o. female who continues to have difficulty gaining weight despite seeing a nutritionist and working with a nutritionist.  Since her last visit she has not noticed a significant change in her weight.  She has nausea but denies vomiting.  She has not had any recent  "constipation or diarrhea.  She continues to have mild intermittent reflux and dysphagia.  She recently saw a hematologist to discuss intravenous iron infusions versus oral liquid iron supplementation and he suggested the oral liquid iron supplementation.  She is reluctant to start this because she is concerned about nausea.  She is very busy currently writing a textbook and is concerned this could impact her.  Her upper endoscopy on July 24, 2024 was notable only for a 3 cm sliding hiatal hernia but otherwise unremarkable.   Review of Systems   Constitutional:  Positive for unexpected weight change. Negative for chills, fatigue and fever.   HENT:  Negative for trouble swallowing.    Eyes:  Negative for visual disturbance.   Respiratory:  Negative for cough and shortness of breath.    Cardiovascular:  Negative for chest pain.   Gastrointestinal:  Negative for abdominal distention, abdominal pain, blood in stool, constipation, diarrhea, nausea and vomiting.   Musculoskeletal:  Negative for arthralgias, gait problem and myalgias.   Skin:  Negative for pallor and rash.   Neurological:  Negative for dizziness, weakness and headaches.   Hematological:  Negative for adenopathy. Does not bruise/bleed easily.   Psychiatric/Behavioral:  Negative for dysphoric mood. The patient is not nervous/anxious.        Objective   Ht 5' 6\" (1.676 m)   BMI 15.27 kg/m²     Physical Exam  Constitutional:       Appearance: She is ill-appearing.   Skin:     Coloration: Skin is not jaundiced.      Findings: No rash.         Visit Time  Total Visit Duration: 22  "

## 2025-04-21 ENCOUNTER — TELEPHONE (OUTPATIENT)
Age: 50
End: 2025-04-21

## 2025-04-21 ENCOUNTER — HOSPITAL ENCOUNTER (INPATIENT)
Facility: HOSPITAL | Age: 50
LOS: 2 days | Discharge: HOME | DRG: 641 | End: 2025-04-23
Attending: EMERGENCY MEDICINE | Admitting: INTERNAL MEDICINE
Payer: COMMERCIAL

## 2025-04-21 DIAGNOSIS — D50.9 IRON DEFICIENCY ANEMIA, UNSPECIFIED IRON DEFICIENCY ANEMIA TYPE: ICD-10-CM

## 2025-04-21 DIAGNOSIS — R63.4 WEIGHT LOSS: Primary | ICD-10-CM

## 2025-04-21 DIAGNOSIS — F22 DELUSIONAL DISORDER (HCC): ICD-10-CM

## 2025-04-21 DIAGNOSIS — R62.7 FAILURE TO THRIVE IN ADULT: ICD-10-CM

## 2025-04-21 DIAGNOSIS — R79.89 ELEVATED TSH: ICD-10-CM

## 2025-04-21 DIAGNOSIS — R34 ANURIA: ICD-10-CM

## 2025-04-21 LAB
ALBUMIN SERPL BCG-MCNC: 4.6 G/DL (ref 3.5–5)
ALP SERPL-CCNC: 40 U/L (ref 34–104)
ALT SERPL W P-5'-P-CCNC: 10 U/L (ref 7–52)
ANION GAP SERPL CALCULATED.3IONS-SCNC: 14 MMOL/L (ref 4–13)
APAP SERPL-MCNC: <2 UG/ML (ref 10–20)
APTT PPP: 28 SECONDS (ref 23–34)
AST SERPL W P-5'-P-CCNC: 15 U/L (ref 13–39)
ATRIAL RATE: 81 BPM
BASOPHILS # BLD AUTO: 0.04 THOUSANDS/ÂΜL (ref 0–0.1)
BASOPHILS NFR BLD AUTO: 1 % (ref 0–1)
BILIRUB DIRECT SERPL-MCNC: 0.1 MG/DL (ref 0–0.2)
BILIRUB SERPL-MCNC: 0.57 MG/DL (ref 0.2–1)
BUN SERPL-MCNC: 13 MG/DL (ref 5–25)
CALCIUM SERPL-MCNC: 10 MG/DL (ref 8.4–10.2)
CARDIAC TROPONIN I PNL SERPL HS: <2 NG/L (ref ?–50)
CHLORIDE SERPL-SCNC: 99 MMOL/L (ref 96–108)
CK SERPL-CCNC: 56 U/L (ref 26–192)
CO2 SERPL-SCNC: 26 MMOL/L (ref 21–32)
CREAT SERPL-MCNC: 0.69 MG/DL (ref 0.6–1.3)
EOSINOPHIL # BLD AUTO: 0.01 THOUSAND/ÂΜL (ref 0–0.61)
EOSINOPHIL NFR BLD AUTO: 0 % (ref 0–6)
ERYTHROCYTE [DISTWIDTH] IN BLOOD BY AUTOMATED COUNT: 17.6 % (ref 11.6–15.1)
ETHANOL SERPL-MCNC: <10 MG/DL
FERRITIN SERPL-MCNC: 6 NG/ML (ref 30–307)
GFR SERPL CREATININE-BSD FRML MDRD: 103 ML/MIN/1.73SQ M
GLUCOSE SERPL-MCNC: 96 MG/DL (ref 65–140)
HCT VFR BLD AUTO: 33.9 % (ref 34.8–46.1)
HGB BLD-MCNC: 10.8 G/DL (ref 11.5–15.4)
IMM GRANULOCYTES # BLD AUTO: 0.02 THOUSAND/UL (ref 0–0.2)
IMM GRANULOCYTES NFR BLD AUTO: 0 % (ref 0–2)
INR PPP: 1.22 (ref 0.85–1.19)
IRON SATN MFR SERPL: 3 % (ref 15–50)
IRON SERPL-MCNC: 13 UG/DL (ref 50–212)
LIPASE SERPL-CCNC: 31 U/L (ref 11–82)
LYMPHOCYTES # BLD AUTO: 1.19 THOUSANDS/ÂΜL (ref 0.6–4.47)
LYMPHOCYTES NFR BLD AUTO: 18 % (ref 14–44)
MAGNESIUM SERPL-MCNC: 2.3 MG/DL (ref 1.9–2.7)
MCH RBC QN AUTO: 23.6 PG (ref 26.8–34.3)
MCHC RBC AUTO-ENTMCNC: 31.9 G/DL (ref 31.4–37.4)
MCV RBC AUTO: 74 FL (ref 82–98)
MONOCYTES # BLD AUTO: 0.66 THOUSAND/ÂΜL (ref 0.17–1.22)
MONOCYTES NFR BLD AUTO: 10 % (ref 4–12)
NEUTROPHILS # BLD AUTO: 4.85 THOUSANDS/ÂΜL (ref 1.85–7.62)
NEUTS SEG NFR BLD AUTO: 71 % (ref 43–75)
NRBC BLD AUTO-RTO: 0 /100 WBCS
P AXIS: 77 DEGREES
PHOSPHATE SERPL-MCNC: 4.6 MG/DL (ref 2.7–4.5)
PLATELET # BLD AUTO: 473 THOUSANDS/UL (ref 149–390)
PMV BLD AUTO: 9.2 FL (ref 8.9–12.7)
POTASSIUM SERPL-SCNC: 3.5 MMOL/L (ref 3.5–5.3)
PR INTERVAL: 130 MS
PROT SERPL-MCNC: 7.5 G/DL (ref 6.4–8.4)
PROTHROMBIN TIME: 15.6 SECONDS (ref 12.3–15)
QRS AXIS: 94 DEGREES
QRSD INTERVAL: 92 MS
QT INTERVAL: 426 MS
QTC INTERVAL: 494 MS
RBC # BLD AUTO: 4.58 MILLION/UL (ref 3.81–5.12)
SALICYLATES SERPL-MCNC: <5 MG/DL (ref 3–20)
SODIUM SERPL-SCNC: 139 MMOL/L (ref 135–147)
T WAVE AXIS: 80 DEGREES
T4 FREE SERPL-MCNC: 0.86 NG/DL (ref 0.61–1.12)
TIBC SERPL-MCNC: 488.6 UG/DL (ref 250–450)
TRANSFERRIN SERPL-MCNC: 349 MG/DL (ref 203–362)
TSH SERPL DL<=0.05 MIU/L-ACNC: 4.57 UIU/ML (ref 0.45–4.5)
UIBC SERPL-MCNC: 476 UG/DL (ref 155–355)
VENTRICULAR RATE: 81 BPM
WBC # BLD AUTO: 6.77 THOUSAND/UL (ref 4.31–10.16)

## 2025-04-21 PROCEDURE — 85610 PROTHROMBIN TIME: CPT

## 2025-04-21 PROCEDURE — 83735 ASSAY OF MAGNESIUM: CPT

## 2025-04-21 PROCEDURE — 80143 DRUG ASSAY ACETAMINOPHEN: CPT

## 2025-04-21 PROCEDURE — 96367 TX/PROPH/DG ADDL SEQ IV INF: CPT

## 2025-04-21 PROCEDURE — 93005 ELECTROCARDIOGRAM TRACING: CPT

## 2025-04-21 PROCEDURE — 36415 COLL VENOUS BLD VENIPUNCTURE: CPT

## 2025-04-21 PROCEDURE — 82550 ASSAY OF CK (CPK): CPT

## 2025-04-21 PROCEDURE — 84443 ASSAY THYROID STIM HORMONE: CPT

## 2025-04-21 PROCEDURE — 84439 ASSAY OF FREE THYROXINE: CPT

## 2025-04-21 PROCEDURE — 80076 HEPATIC FUNCTION PANEL: CPT

## 2025-04-21 PROCEDURE — 83550 IRON BINDING TEST: CPT

## 2025-04-21 PROCEDURE — 80179 DRUG ASSAY SALICYLATE: CPT

## 2025-04-21 PROCEDURE — 96366 THER/PROPH/DIAG IV INF ADDON: CPT

## 2025-04-21 PROCEDURE — 93010 ELECTROCARDIOGRAM REPORT: CPT

## 2025-04-21 PROCEDURE — 99285 EMERGENCY DEPT VISIT HI MDM: CPT

## 2025-04-21 PROCEDURE — 80048 BASIC METABOLIC PNL TOTAL CA: CPT

## 2025-04-21 PROCEDURE — 96365 THER/PROPH/DIAG IV INF INIT: CPT

## 2025-04-21 PROCEDURE — 83690 ASSAY OF LIPASE: CPT

## 2025-04-21 PROCEDURE — 84100 ASSAY OF PHOSPHORUS: CPT

## 2025-04-21 PROCEDURE — 99284 EMERGENCY DEPT VISIT MOD MDM: CPT

## 2025-04-21 PROCEDURE — 85730 THROMBOPLASTIN TIME PARTIAL: CPT

## 2025-04-21 PROCEDURE — 82728 ASSAY OF FERRITIN: CPT

## 2025-04-21 PROCEDURE — 83540 ASSAY OF IRON: CPT

## 2025-04-21 PROCEDURE — 82077 ASSAY SPEC XCP UR&BREATH IA: CPT

## 2025-04-21 PROCEDURE — 85025 COMPLETE CBC W/AUTO DIFF WBC: CPT

## 2025-04-21 PROCEDURE — 84484 ASSAY OF TROPONIN QUANT: CPT

## 2025-04-21 RX ORDER — ONDANSETRON 2 MG/ML
4 INJECTION INTRAMUSCULAR; INTRAVENOUS EVERY 6 HOURS PRN
Status: DISCONTINUED | OUTPATIENT
Start: 2025-04-21 | End: 2025-04-23 | Stop reason: HOSPADM

## 2025-04-21 RX ORDER — FAMOTIDINE 10 MG/ML
20 INJECTION, SOLUTION INTRAVENOUS ONCE
Status: DISCONTINUED | OUTPATIENT
Start: 2025-04-21 | End: 2025-04-22 | Stop reason: SDUPTHER

## 2025-04-21 RX ORDER — HEPARIN SODIUM 5000 [USP'U]/ML
5000 INJECTION, SOLUTION INTRAVENOUS; SUBCUTANEOUS EVERY 8 HOURS SCHEDULED
Status: DISCONTINUED | OUTPATIENT
Start: 2025-04-21 | End: 2025-04-23 | Stop reason: HOSPADM

## 2025-04-21 RX ORDER — PANTOPRAZOLE SODIUM 40 MG/10ML
40 INJECTION, POWDER, LYOPHILIZED, FOR SOLUTION INTRAVENOUS ONCE
Status: DISCONTINUED | OUTPATIENT
Start: 2025-04-21 | End: 2025-04-22 | Stop reason: SDUPTHER

## 2025-04-21 RX ORDER — SODIUM CHLORIDE, SODIUM GLUCONATE, SODIUM ACETATE, POTASSIUM CHLORIDE, MAGNESIUM CHLORIDE, SODIUM PHOSPHATE, DIBASIC, AND POTASSIUM PHOSPHATE .53; .5; .37; .037; .03; .012; .00082 G/100ML; G/100ML; G/100ML; G/100ML; G/100ML; G/100ML; G/100ML
125 INJECTION, SOLUTION INTRAVENOUS CONTINUOUS
Status: DISCONTINUED | OUTPATIENT
Start: 2025-04-21 | End: 2025-04-23

## 2025-04-21 RX ORDER — SODIUM CHLORIDE, SODIUM GLUCONATE, SODIUM ACETATE, POTASSIUM CHLORIDE, MAGNESIUM CHLORIDE, SODIUM PHOSPHATE, DIBASIC, AND POTASSIUM PHOSPHATE .53; .5; .37; .037; .03; .012; .00082 G/100ML; G/100ML; G/100ML; G/100ML; G/100ML; G/100ML; G/100ML
1000 INJECTION, SOLUTION INTRAVENOUS ONCE
Status: COMPLETED | OUTPATIENT
Start: 2025-04-21 | End: 2025-04-21

## 2025-04-21 RX ORDER — CALCIUM CARBONATE 500 MG/1
1000 TABLET, CHEWABLE ORAL DAILY PRN
Status: DISCONTINUED | OUTPATIENT
Start: 2025-04-21 | End: 2025-04-23 | Stop reason: HOSPADM

## 2025-04-21 RX ORDER — ALBUTEROL SULFATE 90 UG/1
2 INHALANT RESPIRATORY (INHALATION) EVERY 6 HOURS PRN
Status: DISCONTINUED | OUTPATIENT
Start: 2025-04-21 | End: 2025-04-23 | Stop reason: HOSPADM

## 2025-04-21 RX ORDER — ACETAMINOPHEN 325 MG/1
650 TABLET ORAL EVERY 6 HOURS PRN
Status: DISCONTINUED | OUTPATIENT
Start: 2025-04-21 | End: 2025-04-23 | Stop reason: HOSPADM

## 2025-04-21 RX ADMIN — SODIUM CHLORIDE, SODIUM GLUCONATE, SODIUM ACETATE, POTASSIUM CHLORIDE, MAGNESIUM CHLORIDE, SODIUM PHOSPHATE, DIBASIC, AND POTASSIUM PHOSPHATE 1000 ML: .53; .5; .37; .037; .03; .012; .00082 INJECTION, SOLUTION INTRAVENOUS at 16:26

## 2025-04-21 RX ADMIN — SODIUM CHLORIDE, SODIUM LACTATE, POTASSIUM CHLORIDE, AND CALCIUM CHLORIDE 1000 ML: .6; .31; .03; .02 INJECTION, SOLUTION INTRAVENOUS at 18:07

## 2025-04-21 NOTE — TELEPHONE ENCOUNTER
Patient calling back regarding lithium battery exposure and that it is making her feel extremely unwell.  Is down to 83 lb and is very concerned about weight loss and malnutrition.  Would like letter for STD until we can get her weight up and health better.  Mother is taking patient to ED at this time.  Would like doctor to see her as well.  Explained ED doctor would need to consult.

## 2025-04-21 NOTE — ED NOTES
"Patient is a 49 yr old female who presented to the ED with a severe weight loss issue and inablility to swallow. She was brought to the ED by her sister. She reports that she is not eating due to the inability to swallow, and also reports that she has [facility] specialists following her care due to her [facility] medical problems, but has not followed through with any of the appointments with the specialists.. Her weight has dropped from 96 to 84 lbs., with a dropping BMI. (She is 5'7\" tall). She denies any depression symptoms, no psychosis is noted. She fears that she might have cancer or something else serious. However, she does not follow through with the appointments and will not do tele appointments because \"doctors don't do these type of appointments\". She believes that she has a lithium allergy and has a wound on her face due to touching her face after the phone was leaking (she has no marks on her face), Her sister Jannette brought her to the ED due to concern about her behavior. She doesn't want to leave her house due to pesticide allergy, and also has an allergy to the sun and light. When questioned about these delusions, she becomes defensive and deflects the conversation. Asked if she has any mental health hx, she said that she had anxiety and meets with a therapist, but that is all. Discussed concerned that her thoughts are unrealistic, and perhaps treatment for her depression and anxiety would be in order, she became defensive and said that she is not here for that. In the ED, patient is very anxious and also fearful of a psychiatric admission. She does not want to sign a 201. She feels that her medical problems are serious, but not enough to follow with with appointments.     Patient has no hx of mental health admissions.  She reports that she has a therapist for her anxiety.    Decision was made for a psychiatric consult at this time.     Michell AGUIRRE   "

## 2025-04-21 NOTE — TELEPHONE ENCOUNTER
Patients GI provider:  Dr. Harrington    Number to return call: [phone]    Reason for call: Pt calling to request Dr. Harrington help to create a letter for short term disability. Pt stated was exposed to lithium battery fumes several times. Has been having indigestion and weight dropped down to 83lbs. Was in the process of inquiring if pt is having any other GI symptoms, pt stated will call back later as she was receiving a call from her counselor. Please review w/Dr. Harrington and see if able to provide requested letter.    Scheduled procedure/appointment date if applicable: Apt 09/16/2025
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
## 2025-04-22 PROBLEM — R45.89 ANXIETY ABOUT HEALTH: Status: ACTIVE | Noted: 2025-04-22

## 2025-04-22 LAB
AMPHETAMINES SERPL QL SCN: NEGATIVE
ANION GAP SERPL CALCULATED.3IONS-SCNC: 7 MMOL/L (ref 4–13)
BARBITURATES UR QL: NEGATIVE
BASOPHILS # BLD AUTO: 0.05 THOUSANDS/ÂΜL (ref 0–0.1)
BASOPHILS NFR BLD AUTO: 1 % (ref 0–1)
BENZODIAZ UR QL: NEGATIVE
BUN SERPL-MCNC: 7 MG/DL (ref 5–25)
CALCIUM SERPL-MCNC: 8.6 MG/DL (ref 8.4–10.2)
CHLORIDE SERPL-SCNC: 103 MMOL/L (ref 96–108)
CO2 SERPL-SCNC: 29 MMOL/L (ref 21–32)
COCAINE UR QL: NEGATIVE
CREAT SERPL-MCNC: 0.51 MG/DL (ref 0.6–1.3)
EOSINOPHIL # BLD AUTO: 0.11 THOUSAND/ÂΜL (ref 0–0.61)
EOSINOPHIL NFR BLD AUTO: 2 % (ref 0–6)
ERYTHROCYTE [DISTWIDTH] IN BLOOD BY AUTOMATED COUNT: 17.3 % (ref 11.6–15.1)
FENTANYL UR QL SCN: NEGATIVE
GFR SERPL CREATININE-BSD FRML MDRD: 114 ML/MIN/1.73SQ M
GLUCOSE SERPL-MCNC: 80 MG/DL (ref 65–140)
HCT VFR BLD AUTO: 30.9 % (ref 34.8–46.1)
HGB BLD-MCNC: 9.5 G/DL (ref 11.5–15.4)
HYDROCODONE UR QL SCN: NEGATIVE
IMM GRANULOCYTES # BLD AUTO: 0.01 THOUSAND/UL (ref 0–0.2)
IMM GRANULOCYTES NFR BLD AUTO: 0 % (ref 0–2)
LITHIUM SERPL-SCNC: <0.1 MMOL/L (ref 0.6–1.2)
LYMPHOCYTES # BLD AUTO: 1.39 THOUSANDS/ÂΜL (ref 0.6–4.47)
LYMPHOCYTES NFR BLD AUTO: 25 % (ref 14–44)
MCH RBC QN AUTO: 23.1 PG (ref 26.8–34.3)
MCHC RBC AUTO-ENTMCNC: 30.7 G/DL (ref 31.4–37.4)
MCV RBC AUTO: 75 FL (ref 82–98)
METHADONE UR QL: NEGATIVE
MONOCYTES # BLD AUTO: 0.6 THOUSAND/ÂΜL (ref 0.17–1.22)
MONOCYTES NFR BLD AUTO: 11 % (ref 4–12)
NEUTROPHILS # BLD AUTO: 3.36 THOUSANDS/ÂΜL (ref 1.85–7.62)
NEUTS SEG NFR BLD AUTO: 61 % (ref 43–75)
NRBC BLD AUTO-RTO: 0 /100 WBCS
OPIATES UR QL SCN: NEGATIVE
OXYCODONE+OXYMORPHONE UR QL SCN: NEGATIVE
PCP UR QL: NEGATIVE
PLATELET # BLD AUTO: 406 THOUSANDS/UL (ref 149–390)
PMV BLD AUTO: 9.1 FL (ref 8.9–12.7)
POTASSIUM SERPL-SCNC: 3.3 MMOL/L (ref 3.5–5.3)
RBC # BLD AUTO: 4.11 MILLION/UL (ref 3.81–5.12)
SODIUM SERPL-SCNC: 139 MMOL/L (ref 135–147)
THC UR QL: NEGATIVE
WBC # BLD AUTO: 5.52 THOUSAND/UL (ref 4.31–10.16)

## 2025-04-22 PROCEDURE — 80178 ASSAY OF LITHIUM: CPT

## 2025-04-22 PROCEDURE — 80307 DRUG TEST PRSMV CHEM ANLYZR: CPT

## 2025-04-22 PROCEDURE — 85025 COMPLETE CBC W/AUTO DIFF WBC: CPT

## 2025-04-22 PROCEDURE — 80048 BASIC METABOLIC PNL TOTAL CA: CPT

## 2025-04-22 PROCEDURE — 99223 1ST HOSP IP/OBS HIGH 75: CPT | Performed by: INTERNAL MEDICINE

## 2025-04-22 RX ORDER — POTASSIUM CHLORIDE 14.9 MG/ML
20 INJECTION INTRAVENOUS ONCE
Status: DISCONTINUED | OUTPATIENT
Start: 2025-04-22 | End: 2025-04-23

## 2025-04-22 RX ORDER — DIPHENHYDRAMINE HYDROCHLORIDE 50 MG/ML
25 INJECTION, SOLUTION INTRAMUSCULAR; INTRAVENOUS EVERY 6 HOURS PRN
Status: DISCONTINUED | OUTPATIENT
Start: 2025-04-22 | End: 2025-04-23

## 2025-04-22 RX ORDER — BACITRACIN, NEOMYCIN, POLYMYXIN B 400; 3.5; 5 [USP'U]/G; MG/G; [USP'U]/G
1 OINTMENT TOPICAL 2 TIMES DAILY PRN
Status: DISCONTINUED | OUTPATIENT
Start: 2025-04-22 | End: 2025-04-23 | Stop reason: HOSPADM

## 2025-04-22 RX ORDER — POTASSIUM CHLORIDE 1500 MG/1
40 TABLET, EXTENDED RELEASE ORAL EVERY 4 HOURS
Status: DISCONTINUED | OUTPATIENT
Start: 2025-04-22 | End: 2025-04-22

## 2025-04-22 RX ADMIN — DIPHENHYDRAMINE HYDROCHLORIDE 25 MG: 50 INJECTION, SOLUTION INTRAMUSCULAR; INTRAVENOUS at 14:49

## 2025-04-22 RX ADMIN — IRON SUCROSE 200 MG: 20 INJECTION, SOLUTION INTRAVENOUS at 14:46

## 2025-04-22 NOTE — ED NOTES
[facility] Crisis clarified phone number, [phone]. This RN contacted at this time. [facility].     Kristin Goodrich, RN  04/21/25 0966

## 2025-04-22 NOTE — TELEMEDICINE
"TeleConsultation - Behavioral Health   Name: Cate Peña 49 y.o. female I MRN: [id number]  Unit/Bed#: E5 -01 I Date of Admission: 4/21/2025   Date of Service: 4/22/2025 I Hospital Day: 1    Consult to Psychiatry  Consult performed by: Adarsh Calabrese MD  Consult ordered by: Ashlee Petersen PA-C      Physician Requesting Consult: Cristian Wills MD  Principal Problem:Failure to thrive in adult  Reason for Consult: {PSYCH HPI Choices:01636}     Assessment & Plan   ***    TREATMENT PLAN RECOMMENDATIONS:  Medications: ***       Informed consent for the above medication has been obtained including discussion of the risks, benefits and alternatives: {[facility] Informed Consent Obtained:54215}    Disposition: {[facility] Dispo:83844}    Legal Status Recommendation: {[facility] Legal Status:67097}    Multiple Antipsychotic Review: {[facility] Antipsychotic Review:04521}    Psychotherapy/Psychoeducation: {[facility] Psychotherapy/Psychoeducation:58282}    Other/Medical Work Up and/or treatment modality recommendations: {[facility] Other Treatment Recs:85901}    Patient Caregiver/Family Education: {[facility] Caregiver/Family Education:20463}    Follow-up: {[facility] Follow-up:55154}    Report regarding the above Assessment and Treatment plan was provided to: ***      History of Present Illness      Patient is a 49 y.o. female with a history of {PSYCH Diagnosis:31419} who was presented to the hospital due to ***. Psychiatric consultation was requested due to {PSYCH HPI Choices:23038}.          Psychiatric Review Of Systems:     Sleep changes: {YES/NO:87558::\"no\"}  appetite changes: {YES/NO:50966::\"no\"}  weight changes: {YES/NO:24741::\"no\"}  anxiety/panic: {YES/NO:26775::\"no\"}  con: {YES/NO:91425::\"no\"}  guilty/hopeless: {YES/NO:12792::\"no\"}  self injurious behavior/risky behavior: {YES/NO:62231::\"no\"}    Historical Information     Past Psychiatric History: ***    {Past Psychiatric Histroy:96475:s:\"Psychiatric " "Hospitalizations:\",\"Outpatient Treatment History:\",\"Suicide Attempts: \",\"History of self-harm:\",\"Violence History:\"}    Substance Abuse History:    E-Cigarette/Vaping   • E-Cigarette Use Never User           Social History       Tobacco History       Smoking Status  Never      Smokeless Tobacco Use  Never              Alcohol History       Alcohol Use Status  No              Drug Use       Drug Use Status  Never              Sexual Activity       Sexually Active  Not Asked              Other Factors    Not Asked                         Social History:    Social History       Social History     Socioeconomic History   • Marital status: Single     Spouse name: Not on file   • Number of children: 1   • Years of education: Not on file   • Highest education level: Not on file   Occupational History   • Not on file   Tobacco Use   • Smoking status: Never   • Smokeless tobacco: Never   Vaping Use   • Vaping status: Never Used   Substance and Sexual Activity   • Alcohol use: No   • Drug use: Never   • Sexual activity: Not on file   Other Topics Concern   • Not on file   Social History Narrative    Who lives in your home: daughter    What type of home do you live in: Single house    Age of your home: less than 40 years    How long have you been living there: 1 year    Type of heat: Forced hot air    Type of fuel: Electric    What type of rosa is in your bedroom: Carpet    Do you have the following in or near your home:    Air products: Central air and Humidifier    Pests: None    Pets: None    Are pets allowed in bedroom: N/A    Open fields, wooded areas nearby: Wooded areas    Basement: Unfinished    Exposure to second hand smoke: No        Habits:Caffeine - never    Chocolate:     Other:              Social Drivers of Health     Financial Resource Strain: High Risk (11/29/2024)    Received [facility]    Overall Financial Resource Strain (CARDIA)    • Difficulty of Paying Living Expenses: Hard "   Food Insecurity: No Food Insecurity (2025)    Nursing - Inadequate Food Risk Classification    • Worried About Running Out of Food in the Last Year: Not on file    • Ran Out of Food in the Last Year: Not on file    • Ran Out of Food in the Last Year: Never true   Transportation Needs: No Transportation Needs (2025)    Nursing - Transportation Risk Classification    • Lack of Transportation: Not on file    • Lack of Transportation: No   Physical Activity: Inactive (2022)    Exercise Vital Sign    • Days of Exercise per Week: 0 days    • Minutes of Exercise per Session: 0 min   Stress: No Stress Concern Present (2024)    Received from [facility]    [employer] [place] - Occupational Stress Questionnaire    • Feeling of Stress : Not at all   Social Connections: Unknown (2024)    Received from [facility]    OASIS : Social Isolation    • How often do you feel lonely or isolated from those around you?: Patient declines to respond   Intimate Partner Violence: Unknown (2025)    Nursing IPS    • Feels Physically and Emotionally Safe: Not on file    • Physically Hurt by Someone: Not on file    • Humiliated or Emotionally Abused by Someone: Not on file    • Physically Hurt by Someone: No    • Hurt or Threatened by Someone: No   Housing Stability: Unknown (2025)    Nursing: Inadequate Housing Risk Classification    • Has Housing: Not on file    • Worried About Losing Housing: Not on file    • Unable to Get Utilities: Not on file    • Unable to Pay for Housing in the Last Year: No    • Has Housin                   Past Medical History:    Past Medical History:   Diagnosis Date   • Asthma    • Costochondritis    • Deviated septum     left-with spur   • Disease of thyroid gland    • Fibroid, uterine    • Food poisoning    • Hiatal hernia 2024    sliding   • Ovarian cyst    • Psychiatric disorder     anxiety/  "depression   • Seasonal allergies    • Thyroid disease    • Vitamin D deficiency           Meds/Allergies     Allergies   Allergen Reactions   • Metronidazole Anxiety, Dizziness, Palpitations and Shortness Of Breath   • Other Anaphylaxis, Hives, Shortness Of Breath, Itching, Photosensitivity, Nausea Only, Swelling, Anxiety, Palpitations, Rash, Other (See Comments), Tinnitus, GI Intolerance, Dizziness, Headache, Tachycardia, Confusion, Chest Pain, Fatigue, Irritability, Syncope, Drowsiness and Nasal Congestion     Fluorescent and halogen lights     *Solar Urticaria- Sun and    *Pressure Urticaria    *Pesticides   • Strawberry (Diagnostic) - Food Allergy Dermatitis, Hives, Itching, Rash and Shortness Of Breath   • Sulfites - Food Allergy Anaphylaxis, Hives and Shortness Of Breath   • Adhesive  [Medical Tape] Hives   • American Cockroach Other (See Comments)   • Cat Dander Other (See Comments)   • Dog Epithelium (Canis Lupus Familiaris) Other (See Comments)   • Latex Hives   • Penicillin G    • Pollen Extract Other (See Comments)   • Shellfish-Derived Products - Food Allergy Other (See Comments)   • Shrimp Extract Allergy Skin Test - Food Allergy Other (See Comments)     {[facility] H[facility] MEDS:[id number]}    Medical Review Of Systems:    Review of Systems      Objective     Vital signs in last 24 hours:  Temp:  [98.5 °F (36.9 °C)] 98.5 °F (36.9 °C)  HR:  [75-83] 82  BP: (109-126)/(59-74) 109/66  Resp:  [16-18] 17  SpO2:  [94 %-99 %] 97 %  O2 Device: None (Room air)      Intake/Output Summary (Last 24 hours) at 4/22/2025 1136  Last data filed at 4/22/2025 0914  Gross per 24 hour   Intake 1240 ml   Output 700 ml   Net 540 ml       Mental Status Evaluation::    Appearance {EFO AMB EXAM; GENERAL PSYCH:07256::\"age appropriate\",\"casually dressed\"}   Behavior {EFO AMB Exam; behavior:47294::\"cooperative\",\"calm\"}   Speech {EFO AMB EXAM; speech:00195::\"normal rate\",\"normal volume\",\"normal pitch\"}   Mood {EFO EXAM; MOOD LESS/MORE:67634} " "  Affect {EFO AMB AFFECT:24802::\"normal range and intensity\",\"appropriate\"}   Thought Processes {EFO AMB THOUGHT PROCESS:54602::\"organized\",\"goal directed\"}   Associations {EFO AMB THOUGHT ASSOCIATIONS:46249::\"intact associations\"}   Thought Content {EFO AMB Exam; thought content:33949::\"no overt delusions\"}   Perceptual Disturbances: {EFO AMB Perceptual Disturbances:60383::\"no auditory hallucinations\",\"no visual hallucinations\"}   Abnormal Thoughts  Risk Potential Suicidal ideation - {EFO Amb Suicidal Thoughts:35721::\"None\"}  Homicidal ideation - {EFO Amb HI:17141::\"None\"}  Potential for aggression - {EFO Potential for aggression:48222::\"No\"}   Orientation {EFO AMB ORIENTED/DISORIENTED:54989}   Memory {EFO AMB EXAM; PSYCH COGNITION:98253::\"recent and remote memory grossly intact\"}   Consciousness {EFO AMB Consciousness:58526::\"alert\",\"awake\"}   Attention Span Concentration Span {EFO AMB EXAM ATTENTION:98572::\"attention span and concentration are age appropriate\"}   Intellect {EFO AMB INTELLECTUAL FUNC:37067::\"appears to be of average intelligence\"}   Insight {EFO AMB EXAM; PSYCH INSIGHT/JUDGEMENT:09719::\"intact\"}   Judgement {EFO AMB EXAM; PSYCH INSIGHT/JUDGEMENT:42446::\"intact\"}   Muscle Strength and  Gait No assessed   Motor Activity {EFO SL IP Psych Motor Activity:02503}   Language {EFO AMB PSYCH MENTAL STATUS LANGUAGE:80147::\"no difficulty naming common objects\",\"no difficulty repeating a phrase\",\"no difficulty writing a sentence\"}   Fund of Knowledge {EFO AMB PSYCH MENTAL STATUS FUND OF KNOWLEDGE:76493::\"adequate knowledge of current events\",\"adequate fund of knowledge regarding past history\",\"adequate fund of knowledge regarding vocabulary \"}               Lab Results:  I have reviewed the following lab results:   .     04/21/25  1620 04/22/25  0553   WBC 6.77 5.52   HGB 10.8* 9.5*   HCT 33.9* 30.9*   * 406*   SODIUM 139 139   K 3.5 3.3*   CL 99 103   CO2 26 29   BUN 13 7   CREATININE 0.69 0.51*   GLUC " "96 80   MG 2.3  --    PHOS 4.6*  --    AST 15  --    ALT 10  --    ALB 4.6  --    TBILI 0.57  --    ALKPHOS 40  --       Results from last 7 days   Lab Units 25  0104   BARBITURATE UR  Negative   BENZODIAZEPINE UR  Negative   THC UR  Negative   COCAINE UR  Negative   METHADONE URINE  Negative   OPIATE UR  Negative   PCP UR  Negative     Lipid Profile: No results found for: \"CHOLESTEROL\", \"HDL\", \"TRIG\", \"LDLCALC\", \"NONHDLC\"Thyroid Studies:   Lab Results   Component Value Date    DVF5ILBEWPXK 4.568 (H) 2025    FREET4 0.86 2025     Ammonia: No results found for: \"AMMONIA\"  Drug Levels:   Lab Results   Component Value Date    LITHIUM <0.10 (L) 2025       {Imaging Results Review:35880::\"No pertinent imaging studies reviewed.\"}  {Other Study Results Review:02694::\"No additional pertinent studies reviewed.\"}    Code Status: Level 1 - Full Code  Advance Directive and Living Will:      Power of :    POLST:      Screenings:    1. [facility]  [facility]    2. Pain Screening  { Teleconsult Pain Screenin}    3. Suicide Screening  { Teleconsult Suicide Screenin}    Administrative Statements   VIRTUAL CARE DOCUMENTATION:     1. This service was provided via Telemedicine using {Telehealth platform:41106}     2. Parties in the room with patient during teleconsult {Parties in room:72322}    3. Confidentiality {Telemedicine confidentiality:08525}     4. Participants {Telemedicine participants:91988}    5. Patient acknowledged consent and understanding of privacy and security of the  Telemedicine consult. I informed the patient that I have reviewed their record in Epic and presented the opportunity for them to ask any questions regarding the visit today.  The patient agreed to participate.    6. I have spent a total time of *** minutes in caring for this patient on the day of the visit/encounter including {Counseling Topics:5504860738}, not " including the time spent for establishing the audio/video connection.

## 2025-04-22 NOTE — PLAN OF CARE
Problem: Prexisting or High Potential for Compromised Skin Integrity  Goal: Skin integrity is maintained or improved  Description: INTERVENTIONS:- Identify patients at risk for skin breakdown- Assess and monitor skin integrity- Assess and monitor nutrition and hydration status- Monitor labs - Assess for incontinence - Turn and reposition patient- Assist with mobility/ambulation- Relieve pressure over bony prominences- Avoid friction and shearing- Provide appropriate hygiene as needed including keeping skin clean and dry- Evaluate need for skin moisturizer/barrier cream- Collaborate with interdisciplinary team - Patient/family teaching- Consider wound care consult   Outcome: Progressing     Problem: SAFETY ADULT  Goal: Patient will remain free of falls  Description: INTERVENTIONS:- Educate patient/family on patient safety including physical limitations- Instruct patient to call for assistance with activity - Consult OT/PT to assist with strengthening/mobility - Keep Call bell within reach- Keep bed low and locked with side rails adjusted as appropriate- Keep care items and personal belongings within reach- Initiate and maintain comfort rounds- Make Fall Risk Sign visible to staff- Apply yellow socks and bracelet for high fall risk patients- Consider moving patient to room near nurses station  Outcome: Progressing     Problem: GENITOURINARY - ADULT  Goal: Maintains or returns to baseline urinary function  Description: INTERVENTIONS:- Assess urinary function- Encourage oral fluids to ensure adequate hydration if ordered- Administer IV fluids as ordered to ensure adequate hydration- Administer ordered medications as needed- Offer frequent toileting- Follow urinary retention protocol if ordered  Outcome: Progressing  Goal: Absence of urinary retention  Description: INTERVENTIONS:- Assess patient’s ability to void and empty bladder- Monitor I/O- Bladder scan as needed- Discuss with physician/AP medications to alleviate  retention as needed- Discuss catheterization for long term situations as appropriate  Outcome: Progressing  Goal: Urinary catheter remains patent  Description: INTERVENTIONS:- Assess patency of urinary catheter- If patient has a chronic mckay, consider changing catheter if non-functioning- Follow guidelines for intermittent irrigation of non-functioning urinary catheter  Outcome: Progressing     Problem: DECISION MAKING  Goal: Pt/Family able to effectively weigh alternatives and participate in decision making related to treatment and care  Description: INTERVENTIONS:- Identify decision maker- Determine when there are differences among patient's view, family's view, and healthcare provider's view of patient condition and care goals- Facilitate patient/family articulation of goals for care- Help patient/family identify pros/cons of alternative solutions- Provide information as requested by patient/family- Respect patient/family rights related to privacy and sharing information - Serve as a liaison between patient, family and health care team- Initiate consults as appropriate (Ethics Team, Palliative Care, Family Care Conference, etc.)  Outcome: Progressing     Problem: CONFUSION/THOUGHT DISTURBANCE  Goal: Thought disturbances (confusion, delirium, depression, dementia or psychosis) are managed to maintain or return to baseline mental status and functional level  Description: INTERVENTIONS:- Assess for possible contributors to  thought disturbance, including but not limited to medications, infection, impaired vision or hearing, underlying metabolic abnormalities, dehydration, respiratory compromise,  psychiatric diagnoses and notify attending PHYSICAN/AP- Monitor and intervene to maintain adequate nutrition, hydration, elimination, sleep and activity- Decrease environmental stimuli, including noise as appropriate.- Provide frequent contacts to provide refocusing, direction and reassurance as needed. Approach patient  calmly with eye contact and at their level.- Forrest high risk fall precautions, aspiration precautions and other safety measures, as indicated- If delirium suspected, notify physician/AP of change in condition and request immediate in-person evaluation- Pursue consults as appropriate including Geriatric (campus dependent), OT for cognitive evaluation/activity planning, psychiatric, pastoral care, etc.  Outcome: Progressing     Problem: BEHAVIOR  Goal: Pt/Family maintain appropriate behavior and adhere to behavioral management agreement, if implemented  Description: INTERVENTIONS:- Assess the family dynamic - Encourage verbalization of thoughts and concerns in a socially appropriate manner- Assess patient/family's coping skills and non-compliant behavior (including use of illegal substances).- Utilize positive, consistent limit setting strategies supporting safety of patient, staff and others- Initiate consult with Case Management, Spiritual Care or other ancillary services as appropriate- If a patient's/visitor's behavior jeopardizes the safety of the patient, staff, or others, refer to organization procedure. - Notify Security of behavior or suspected illegal substances which indicate the need for search of the patient and/or belongings- Encourage participation in the decision making process about a behavioral management agreement; implement if patient meets criteria  Outcome: Progressing

## 2025-04-22 NOTE — PLAN OF CARE
Problem: Prexisting or High Potential for Compromised Skin Integrity  Goal: Skin integrity is maintained or improved  Description: INTERVENTIONS:- Identify patients at risk for skin breakdown- Assess and monitor skin integrity- Assess and monitor nutrition and hydration status- Monitor labs - Assess for incontinence - Turn and reposition patient- Assist with mobility/ambulation- Relieve pressure over bony prominences- Avoid friction and shearing- Provide appropriate hygiene as needed including keeping skin clean and dry- Evaluate need for skin moisturizer/barrier cream- Collaborate with interdisciplinary team - Patient/family teaching- Consider wound care consult   Outcome: Progressing     Problem: SAFETY ADULT  Goal: Patient will remain free of falls  Description: INTERVENTIONS:- Educate patient/family on patient safety including physical limitations- Instruct patient to call for assistance with activity - Consult OT/PT to assist with strengthening/mobility - Keep Call bell within reach- Keep bed low and locked with side rails adjusted as appropriate- Keep care items and personal belongings within reach- Initiate and maintain comfort rounds- Make Fall Risk Sign visible to staff- Apply yellow socks and bracelet for high fall risk patients- Consider moving patient to room near nurses station  Outcome: Progressing
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
## 2025-04-22 NOTE — ED NOTES
"This RN attempting to setup [facility]well consult. Called [phone] multiple times, received automated message stating \"the phone number is not set up to receive phone calls.\" [facility] contacted, provided a different phone number - [phone]. Attempted to call this number, automated message received discussing medical alert buttons. Hospital supervisor contacted at this time.     Kristin Goodrich RN  04/21/25 [id number]    "

## 2025-04-22 NOTE — QUICK NOTE
Psychiatry will plan to evaluate patient on 4/23 in the morning. Spoke to Dr. Wills to discuss the case. Will trend patients PO intake, labs, and calorie count over next 24 hours.

## 2025-04-22 NOTE — SPEECH THERAPY NOTE
Speech Language/Pathology  Speech/Language Pathology  Assessment    Patient Name: Cate Peña  Today's Date: 4/22/2025     Problem List  Principal Problem:    Failure to thrive in adult  Active Problems:    Hypothyroidism due to Hashimoto's thyroiditis    Anxiety about health    Past Medical History  Past Medical History:   Diagnosis Date    Asthma     Costochondritis     Deviated septum 2023    left-with spur    Disease of thyroid gland     Fibroid, uterine     Food poisoning 2023    Hiatal hernia 07/2024    sliding    Ovarian cyst     Psychiatric disorder     anxiety/ depression    Seasonal allergies     Thyroid disease     Vitamin D deficiency      Past Surgical History  Past Surgical History:   Procedure Laterality Date    EGD  07/2024    WISDOM TOOTH EXTRACTION         ST WILL CHECK PT'S STATUS TOMORROW AND F/U AS ABLE AND INDICATED     7/24/24: EGD REPORT LISTED BELOW  8/12/2024:  OP MBS DONE HERE  11/12/24. SEEN BEDSIDE AT [facility] (SEE BELOW)    THERE ARE SEVERAL MORE RECENT GI AND ENT VISITS LOCATED IN ENCOUNTERS. [facility] AND [facility]. NOTED A BARIUM ESOPHAGRAM AND ESOPHAGEAL MANOMETRY HAVE BEEN RECOMMENDED, AS WELL AS CONSIDERATION FOR ALTERNATE NUTRITION.     PREVIOUS MBS  8/12/24 Modified (Video) Barium Swallow Study      Summary:  Images are on PACS for review.      Study was somewhat limited secondary to pt's comfort level w/ taking larger bites. Declined a whole and half pill but was agreeable to 1/4th.      Oral stage: WNL. Pt took extremely small bites and chewed all solids to a puree-like consistency. Transferred piece-meal. Bolus control and formation were WNL.      Pharyngeal stage: WNL for velar elevation, anterior hyoid excursion, laryngeal elevation, pharyngeal constriction, epiglottic inversion, tongue base retraction, and passage through UES. Bolus size was limited however, 2* pt's comfort level. Uncertain if this may have affected study results. No penetration or aspiration. 1/4 pill in  "pudding passed WNL though the pt appeared anxious when swallowing and tapped her throat repeatedly. Pt asked if she aspirated.      Per gross esophageal screen:  Distal stasis, delayed clearance, and retrograde flow noted following consecutive sips of thin liquid in series 3. Mild distal stasis w/ pudding.     Recommendations:  Diet: As tolerated by pt. Pt chewed all food to a puree like consistency today.   Consider supplements if not allergic.  Liquids: thin  Meds: able to take 1/4th pill today. Unable to assess w/ larger size.  Frequent/thorough oral care  Upright position  F/u ST tx: not a this time  Reflux Precautions  Consider consult with: refer back to GI.   Results reviewed with: pt, mother. I explained the results to the pt throughout the study. Both were concerned about aspiration. Educated there was none.   If a dedicated assessment of the esophagus is desired:  Consider esophagram/routine barium swallow/UGI w/ barium tablet administration.        Pt is a 49yof referred by GI for MBS. Pt reports difficulty swallowing, mostly solids and stated she can not swallow pills. Feels as if she is choking. She characterizes current issue as moderate/severe dysphagia and is concerned that PO is going into her airway. The pt pointed to her suprasternal notch and/or neck several times during the study. She was shown there was nothing present. Question if this may be a referred sensation from the esophagus or if pt may have anxiety about swallowing.  I explained that referred sensations are common. She reported it felt \"tight\". Pt was encouraged to take larger bites/spoonfuls (at her comfort level) throughout the study 2* all bites and tsps were extremely small. I explained to the pt that this may somewhat limit the study. Pt stated she should have brought chicken nuggets or pizza. I explained that if she chews the food for several minutes prior to swallowing,  it would likely have been a similar texture (puree-like). "      H&P/pertinent provider notes: (PMH noted above)  Per GI note 7/3/24:  Spoke with pt over the phone after nurse navigator informed me overnight while on call that pt was concerned with worsening symptoms of liquid pooling in her mouth, which woke her up from sleep. Pt has been having heartburn and reflux symptoms worsening over the past few weeks. She states these symptoms have worsened before and at their peak they result in dysphagia with some discomfort in between her clavicles. She has still been unable to gain weight with last reported weight of 95 lbs. She has been eating ice cream and yogurt daily to increase calories, but has also been tolerating cheese, crackers and pizza. She is not taking any medications for GERD. Discussed GERD precautions with pt  including avoidance of trigger foods (potential foods include coffee, caffeine, chocolate, tomato-based products, spicy foods, fatty foods).       BMI is 14.20.      Special Studies:  EGD 7/24/24:  MPRESSION:  3 cm type I hiatal hernia  The esophagus and duodenum appeared normal.  Performed forceps biopsies in the upper third of the esophagus, lower third of the esophagus, stomach and duodenum  RECOMMENDATION:  Await pathology results   Follow-up in our office for further evaluation and management.  If you don't have an appointment scheduled, please call [phone] to schedule your appointment.     Follow-up for outpatient video swallow study     11/12/24 BEDSIDE SWALLOW JOHN ELDER  *See report for complete details.    Recommendations:   Diet: regular diet and thin liquids   Meds: whole with liquid and as tolerated    Frequent Oral care: 4x/day  Other Recommendations/ considerations: may need to consider TF for nutritional support; will follow up as needed

## 2025-04-22 NOTE — UTILIZATION REVIEW
Initial Clinical Review    Admission: Date/Time/Statement:   Admission Orders (From admission, onward)       Ordered        04/21/25 2225  INPATIENT ADMISSION  Once                          Orders Placed This Encounter   Procedures    INPATIENT ADMISSION     Standing Status:   Standing     Number of Occurrences:   1     Level of Care:   Med Surg [16]     Estimated length of stay:   More than 2 Midnights     Certification:   I certify that inpatient services are medically necessary for this patient for a duration of greater than two midnights. See H&P and MD Progress Notes for additional information about the patient's course of treatment.     ED Arrival Information       Expected   -    Arrival   4/21/2025 15:31    Acuity   Emergent              Means of arrival   Walk-In    Escorted by   Family Member    Service   Hospitalist    Admission type   Emergency              Arrival complaint   weight loss, dehydrated             Chief Complaint   Patient presents with    Medical Problem     Pt presents with reports with multiple medical problems. Pt brought in by sister with recent dx of dysphagia, severe decreased appetite and weight loss. Pt went from 96lbs to 84lbs in one month. Pt states she has extensive chemical allergies and that her cell phone was leaking lithium. Pt states she noticed her remote was leaking today and she accidentally rubbed it on her face.        Initial Presentation: 49 y.o. female presents to the ED from home with multiple complaints - failure to thrive over several wks, lithium exposure d/t cell phones, feels she has toxic lithium exposure, not eating or drinking d/t fear of lithium, no other complaints. Pt states she has not urinated in past 24 hr.  PMH: hypothyroidism d/t Hashimoto's thyroiditis, dysphagia, extensive chemical allergies, parasomnia.  In the ED VS stable, no c/o pain. Treated with IV fluids x 2 L.  Labs - UDS negative, elevated anion gap.  ECG - NSR. On exam pt is anxious,  labile, paranoid, delusional, signs of malnutrition.  Admitted to INPATIENT status with Failure to thrive, anxiety about health -  PLAN: Psych consult, IV fluids, Nutrition consult, lithium level AM, home Levothyroxine.     Anticipated Length of Stay/Certification Statement: Patient will be admitted on an inpatient basis with an anticipated length of stay of greater than 2 midnights secondary to Failure to thrive.     Date: 4/22   Day 2:    Failure to thrive, anxiety about health -  low K 3.1, refused IV fluids.  Lithium level low. Psych requesting trend oral intake, labs, calorie counts for next 24 hrs before consult on 4/23 AM.      4/22 Psych Quick Note - will eval on 4/23 in AM, trend oral intake, labs, calorie counts for next 24 hrs.      ED Treatment-Medication Administration from 04/21/2025 1531 to 04/21/2025 2309         Date/Time Order Dose Route Action     04/21/2025 1626 multi-electrolyte (Plasmalyte-A/Isolyte-S PH 7.4/Normosol-R) IV bolus 1,000 mL 1,000 mL Intravenous New Bag     04/21/2025 1807 lactated ringers bolus 1,000 mL 1,000 mL Intravenous New Bag            Scheduled Medications:  Famotidine (PF), 20 mg, Intravenous, Once  heparin (porcine), 5,000 Units, Subcutaneous, Q8H HAZEL  lactated ringers, 1,000 mL, Intravenous, Once  pantoprazole, 40 mg, Intravenous, Once      Continuous IV Infusions:  multi-electrolyte, 125 mL/hr, Intravenous, Continuous - pt refused.       PRN Meds:  acetaminophen, 650 mg, Oral, Q6H PRN  albuterol, 2 puff, Inhalation, Q6H PRN  calcium carbonate, 1,000 mg, Oral, Daily PRN  ondansetron, 4 mg, Intravenous, Q6H PRN      ED Triage Vitals [04/21/25 1548]   Temperature Pulse Respirations Blood Pressure SpO2 Pain Score   98.5 °F (36.9 °C) 83 16 116/69 99 % No Pain     Weight (last 2 days)       Date/Time Weight    04/21/25 1548 38.3 (84.44)            Vital Signs (last 3 days)       Date/Time Temp Pulse Resp BP MAP (mmHg) SpO2 O2 Device Patient Position - Orthostatic VS Pain     04/22/25 0914 -- -- -- -- -- -- -- -- No Pain    04/22/25 07:32:15 98.5 °F (36.9 °C) 82 17 109/66 80 97 % None (Room air) Lying --    04/22/25 07:31:52 -- 81 -- 109/66 80 97 % -- -- --    04/22/25 02:13:29 -- 75 -- 118/62 81 99 % -- -- --    04/21/25 23:36:15 -- 78 18 126/74 91 94 % -- -- --    04/21/25 2324 -- -- -- -- -- -- -- -- No Pain    04/21/25 23:16:45 -- 81 16 110/69 83 95 % -- -- --    04/21/25 2242 -- 77 16 121/59 81 99 % None (Room air) Sitting --    04/21/25 1548 98.5 °F (36.9 °C) 83 16 116/69 89 99 % None (Room air) Lying No Pain              Pertinent Labs/Diagnostic Test Results:   Radiology:  No orders to display     Cardiology:  ECG 12 lead   Final Result by Azam Hernandez DO (04/21 6206)   Normal sinus rhythm   Right atrial enlargement   Rightward axis   Nonspecific T wave abnormality   Prolonged QT   Abnormal ECG   Confirmed by Azam Hernandez ([id number]) on 4/21/2025 4:16:08 PM        GI:  No orders to display           Results from last 7 days   Lab Units 04/22/25  0553 04/21/25  1620   WBC Thousand/uL 5.52 6.77   HEMOGLOBIN g/dL 9.5* 10.8*   HEMATOCRIT % 30.9* 33.9*   PLATELETS Thousands/uL 406* 473*   TOTAL NEUT ABS Thousands/µL 3.36 4.85         Results from last 7 days   Lab Units 04/22/25  0553 04/21/25  1620   SODIUM mmol/L 139 139   POTASSIUM mmol/L 3.3* 3.5   CHLORIDE mmol/L 103 99   CO2 mmol/L 29 26   ANION GAP mmol/L 7 14*   BUN mg/dL 7 13   CREATININE mg/dL 0.51* 0.69   EGFR ml/min/1.73sq m 114 103   CALCIUM mg/dL 8.6 10.0   MAGNESIUM mg/dL  --  2.3   PHOSPHORUS mg/dL  --  4.6*     Results from last 7 days   Lab Units 04/21/25  1620   AST U/L 15   ALT U/L 10   ALK PHOS U/L 40   TOTAL PROTEIN g/dL 7.5   ALBUMIN g/dL 4.6   TOTAL BILIRUBIN mg/dL 0.57   BILIRUBIN DIRECT mg/dL 0.10         Results from last 7 days   Lab Units 04/22/25  0553 04/21/25  1620   GLUCOSE RANDOM mg/dL 80 96           Results from last 7 days   Lab Units 04/21/25  1620   CK TOTAL U/L 56     Results from last 7  days   Lab Units 04/21/25  1620   HS TNI 0HR ng/L <2         Results from last 7 days   Lab Units 04/21/25  1620   PROTIME seconds 15.6*   INR  1.22*   PTT seconds 28     Results from last 7 days   Lab Units 04/21/25  1620   TSH 3RD GENERATON uIU/mL 4.568*       Results from last 7 days   Lab Units 04/21/25  1626   FERRITIN ng/mL 6*   IRON SATURATION % 3*   IRON ug/dL 13*   TIBC ug/dL 488.6*     04/22/25 05:53  LITHIUM LEVEL: <0.10 (L)      Results from last 7 days   Lab Units 04/21/25  1626   TRANSFERRIN mg/dL 349             Results from last 7 days   Lab Units 04/21/25  1620   LIPASE u/L 31           Results from last 7 days   Lab Units 04/22/25  0104   AMPH/METH  Negative   BARBITURATE UR  Negative   BENZODIAZEPINE UR  Negative   COCAINE UR  Negative   METHADONE URINE  Negative   OPIATE UR  Negative   PCP UR  Negative   THC UR  Negative     Results from last 7 days   Lab Units 04/21/25  1620   ETHANOL LVL mg/dL <10   ACETAMINOPHEN LVL ug/mL <2*   SALICYLATE LVL mg/dL <5     Past Medical History:   Diagnosis Date    Asthma     Costochondritis     Deviated septum 2023    left-with spur    Disease of thyroid gland     Fibroid, uterine     Food poisoning 2023    Hiatal hernia 07/2024    sliding    Ovarian cyst     Psychiatric disorder     anxiety/ depression    Seasonal allergies     Thyroid disease     Vitamin D deficiency      Present on Admission:   Hypothyroidism due to Hashimoto's thyroiditis      Admitting Diagnosis: Anuria [R34]  Weight loss [R63.4]  Elevated TSH [R79.89]  Failure to thrive in adult [R62.7]  Delusional disorder (HCC) [F22]  Known medical problems [Z78.9]  Body mass index (BMI) 19.9 or less, adult [Z68.1]  Age/Sex: 49 y.o. female    Network Utilization Review Department  ATTENTION: Please call with any questions or concerns to [phone] and carefully listen to the prompts so that you are directed to the right person. All voicemails are confidential.   For Discharge needs, contact Care  Management DC Support Team at [phone] opt. 2  Send all requests for admission clinical reviews, approved or denied determinations and any other requests to dedicated fax number below belonging to the campus where the patient is receiving treatment. List of dedicated fax numbers for the Facilities:  FACILITY NAME UR FAX NUMBER   ADMISSION DENIALS (Administrative/Medical Necessity) [phone]   DISCHARGE SUPPORT TEAM (NETWORK) [phone]   PARENT CHILD HEALTH (Maternity/NICU/Pediatrics) [phone]   [facility] [phone]   [facility] [phone]   [facility] [phone]   [facility] [phone]   [facility] [phone]   [facility] [phone]   [facility] [phone]   [facility] [phone]   [facility] [phone]   [facility] [phone]   [facility] [phone]   [facility] [phone]

## 2025-04-22 NOTE — UTILIZATION REVIEW
NOTIFICATION OF INPATIENT ADMISSION   AUTHORIZATION REQUEST   SERVICING FACILITY:   [place] [address]  Tax ID: [id number]  NPI: [id number] ATTENDING PROVIDER:  Attending Name and NPI#: Cristian Wills Md [[id number]]  Address: [address] [place] [address]  Phone: [phone]     ADMISSION INFORMATION:  Place of Service: Inpatient [facility]  Place of Service Code: 21  Inpatient Admission Date/Time: 4/21/25 10:25 PM  Discharge Date/Time: No discharge date for patient encounter.  Admitting Diagnosis Code/Description:  Anuria [R34]  Weight loss [R63.4]  Elevated TSH [R79.89]  Failure to thrive in adult [R62.7]  Delusional disorder (HCC) [F22]  Known medical problems [Z78.9]  Body mass index (BMI) 19.9 or less, adult [Z68.1]     UTILIZATION REVIEW CONTACT:  Ivonne Agrawal, Utilization   Network Utilization Review Department  Phone: [phone]  Fax [phone]  Email: [email or web address]  Contact for approvals/pending authorizations, clinical reviews, and discharge.     PHYSICIAN ADVISORY SERVICES:  Medical Necessity Denial & Pugb-me-Sfuv Review  Phone: [phone]  Fax: [phone]  Email: [email or web address]     DISCHARGE SUPPORT TEAM:  For Patients Discharge Needs & Updates  Phone: [phone] opt. 2 Fax: [phone]  Email: [email or web address]

## 2025-04-22 NOTE — ASSESSMENT & PLAN NOTE
Patient with BMI of 13.63.  Patient states she has not been eating or drinking at home secondary to her fear of exposing herself to lithium and causing either lithium or hydrofluoric carbon toxicity.  Patient with signs of malnutrition on exam  At this time, patient refusing to eat as she fears this will make her believed lithium toxicity worse.  Patient agreeable to IV fluids overnight.  Psychiatry consult  Nutrition consult  Please see assessment and plan for Anxiety due to health     4/23-update: Patient now declining psychiatric consult without her family and  being present.  Does appear to have eaten overnight and oddly, her protein and albumin remain stable.   One would think given her BMI and apparent psychiatric illness that her protein and albumin would be more depleted.  Fortunately, she ate 75% of her lunch and a large portion of her breakfast based on bedside calorie count.  She reports she no longer fears lithium in her food and denies SI/HI.  Has met with dietitian and received information on improving p.o. intake.  Will prescribe protein drinks and iron supplementation on discharge; patient encouraged to follow-up with her behavioral health specialist immediately as well as PCP.  Additionally, tumor marker labs are pending and can be followed up with her PCP in the outpatient setting.  CTA has been ordered by GI and patient encouraged to follow-up with her allergist to ensure she has proper prophylaxis, if it is even needed, prior to undergoing contrast dye exposure for the studies.    As per nursing, she is declining iron as well as contrast dye for a CTA chest abdomen pelvis that she and her family specifically requested have done this day.  Additionally, she pulled her IV out overnight and is refusing multiple nursing initiatives such as vitals.  Patient is refusing DVT prophylaxis with heparin as well as potassium supplements.  Each time a specific therapy is ordered, patient gives some  "unrelated excuse for not taking her ordered medications or treatments; i.e., \"I wanted to eat first\", or \"I want to research this on the Internet\", or \"I want to discuss this with my daughter\".  She is very polite and respectful, but unfortunately the end result is always the same; she invariably refuses almost all therapies offered with the exception of IV iron given yesterday.      Patient has a right to refuse any and all treatments; but given her cachexia, illness anxiety disorder, and delusions regarding lithium exposure; there has been some suggestion that she might benefit from a 201 versus 302.  Patient refused to sign 201 and unfortunately, without further investigation, I cannot rule out underlying metabolic/medical disorder that may be contributing to her cachexia.  There is no doubt she does suffer from multiple psychiatric illnesses and likely an eating disorder, but without sufficient exclusion of other underlying acute medical issues, primary team feels it would be inappropriate to pursue her 302 at this time.  At bedside, both her mother and her sister report they are planning to arrange outpatient therapy and, Jannette, her sister appears to understand more clearly today that Cate does have underlying eating disorder.  "

## 2025-04-22 NOTE — ED NOTES
[facility] aware of the patient, will continue to follow patient's progress.    Per chart review psychiatry consult to be conducted on 4/23/25.    Bismark Mena  Crisis Intervention Specialist II  04/22/25

## 2025-04-22 NOTE — ASSESSMENT & PLAN NOTE
Patient stating that over the past 2 weeks she has had 2 phones which are reportedly leaking lithium.  She states that her entire home is mostly contaminated with the lithium and she feels as if she is having symptoms of hydrofluoric carbon toxicity and lithium toxicity.  Patient was recently seen at [facility] for similar complaint.  At that time, lithium level was checked which was within normal limits.  Patient was provided reassurance at that time discharge.  However, she states that it is continuing to affect her life.  Patient has not eaten or drank in the last several days due to her concern that the lithium will spread throughout her body and further poison her.  Of note, patient with no signs of lithium toxicity.  Overall, basic labs are reassuring.  No KAYLIN.  Electrolytes WNL.  Hepatic functions normal.   Patient visibly anxious and not redirectable or reassurable about her health.  Patient very fixated on what she believes to be lithium exposure.  She is currently refusing to eat or drink as she feels that eating or drinking may expose her insides to lithium.  She repeatedly states that she does not believe this is a psychiatric issue and that she believes there is exposure to chemicals that is significantly affecting her.  Will check lithium level in a.m. and attempt to reassure patient that her lithium levels are not elevated.  Patient refusing any medications at this time  Psychiatry consult ordered by ED team.  Patient was seen by crisis worker in ED.  Initial plan was for possible psychiatric placement in ED however, the patient reported to them that she has not urinated in the past 24 hours which prompted them to reach out to internal medicine team for admission.    4/23-update: Patient has refused 3 attempts by behavioral health to interview and intervene.  She refuses 201.  Have advised her to follow-up with her home therapist and primary care as soon as possible.  She denies SI/HI;  reports she no longer fears lithium in her food.

## 2025-04-23 VITALS
RESPIRATION RATE: 18 BRPM | BODY MASS INDEX: 14.44 KG/M2 | TEMPERATURE: 97.5 F | HEART RATE: 94 BPM | OXYGEN SATURATION: 98 % | SYSTOLIC BLOOD PRESSURE: 107 MMHG | HEIGHT: 65 IN | WEIGHT: 86.64 LBS | DIASTOLIC BLOOD PRESSURE: 69 MMHG

## 2025-04-23 LAB
ALBUMIN SERPL BCG-MCNC: 4.3 G/DL (ref 3.5–5)
ALP SERPL-CCNC: 47 U/L (ref 34–104)
ALT SERPL W P-5'-P-CCNC: 8 U/L (ref 7–52)
ANION GAP SERPL CALCULATED.3IONS-SCNC: 11 MMOL/L (ref 4–13)
AST SERPL W P-5'-P-CCNC: 14 U/L (ref 13–39)
BASOPHILS # BLD AUTO: 0.08 THOUSANDS/ÂΜL (ref 0–0.1)
BASOPHILS NFR BLD AUTO: 1 % (ref 0–1)
BILIRUB SERPL-MCNC: 0.41 MG/DL (ref 0.2–1)
BUN SERPL-MCNC: 7 MG/DL (ref 5–25)
CALCIUM SERPL-MCNC: 9.4 MG/DL (ref 8.4–10.2)
CEA SERPL-MCNC: 2 NG/ML (ref 0–3)
CHLORIDE SERPL-SCNC: 104 MMOL/L (ref 96–108)
CO2 SERPL-SCNC: 26 MMOL/L (ref 21–32)
CREAT SERPL-MCNC: 0.7 MG/DL (ref 0.6–1.3)
EOSINOPHIL # BLD AUTO: 0.16 THOUSAND/ÂΜL (ref 0–0.61)
EOSINOPHIL NFR BLD AUTO: 2 % (ref 0–6)
ERYTHROCYTE [DISTWIDTH] IN BLOOD BY AUTOMATED COUNT: 17.8 % (ref 11.6–15.1)
GFR SERPL CREATININE-BSD FRML MDRD: 102 ML/MIN/1.73SQ M
GLUCOSE SERPL-MCNC: 91 MG/DL (ref 65–140)
HCG SERPL QL: NEGATIVE
HCT VFR BLD AUTO: 36.8 % (ref 34.8–46.1)
HGB BLD-MCNC: 11.4 G/DL (ref 11.5–15.4)
HSV2 IGG SERPL QL IA: NEGATIVE
HSV2 IGG SERPL QL IA: NEGATIVE
IMM GRANULOCYTES # BLD AUTO: 0.01 THOUSAND/UL (ref 0–0.2)
IMM GRANULOCYTES NFR BLD AUTO: 0 % (ref 0–2)
LYMPHOCYTES # BLD AUTO: 2.19 THOUSANDS/ÂΜL (ref 0.6–4.47)
LYMPHOCYTES NFR BLD AUTO: 32 % (ref 14–44)
MAGNESIUM SERPL-MCNC: 2.2 MG/DL (ref 1.9–2.7)
MCH RBC QN AUTO: 23.1 PG (ref 26.8–34.3)
MCHC RBC AUTO-ENTMCNC: 31 G/DL (ref 31.4–37.4)
MCV RBC AUTO: 75 FL (ref 82–98)
MONOCYTES # BLD AUTO: 0.74 THOUSAND/ÂΜL (ref 0.17–1.22)
MONOCYTES NFR BLD AUTO: 11 % (ref 4–12)
NEUTROPHILS # BLD AUTO: 3.77 THOUSANDS/ÂΜL (ref 1.85–7.62)
NEUTS SEG NFR BLD AUTO: 54 % (ref 43–75)
NRBC BLD AUTO-RTO: 0 /100 WBCS
PHOSPHATE SERPL-MCNC: 2.6 MG/DL (ref 2.7–4.5)
PLATELET # BLD AUTO: 458 THOUSANDS/UL (ref 149–390)
PLATELET # BLD AUTO: 473 THOUSANDS/UL (ref 149–390)
PMV BLD AUTO: 8.7 FL (ref 8.9–12.7)
PMV BLD AUTO: 9 FL (ref 8.9–12.7)
POTASSIUM SERPL-SCNC: 3.1 MMOL/L (ref 3.5–5.3)
PROT SERPL-MCNC: 7.3 G/DL (ref 6.4–8.4)
RBC # BLD AUTO: 4.93 MILLION/UL (ref 3.81–5.12)
SODIUM SERPL-SCNC: 141 MMOL/L (ref 135–147)
WBC # BLD AUTO: 6.95 THOUSAND/UL (ref 4.31–10.16)

## 2025-04-23 PROCEDURE — 85049 AUTOMATED PLATELET COUNT: CPT

## 2025-04-23 PROCEDURE — 84100 ASSAY OF PHOSPHORUS: CPT | Performed by: INTERNAL MEDICINE

## 2025-04-23 PROCEDURE — 92610 EVALUATE SWALLOWING FUNCTION: CPT

## 2025-04-23 PROCEDURE — 86695 HERPES SIMPLEX TYPE 1 TEST: CPT | Performed by: INTERNAL MEDICINE

## 2025-04-23 PROCEDURE — 84703 CHORIONIC GONADOTROPIN ASSAY: CPT | Performed by: INTERNAL MEDICINE

## 2025-04-23 PROCEDURE — 85025 COMPLETE CBC W/AUTO DIFF WBC: CPT | Performed by: INTERNAL MEDICINE

## 2025-04-23 PROCEDURE — 86301 IMMUNOASSAY TUMOR CA 19-9: CPT | Performed by: INTERNAL MEDICINE

## 2025-04-23 PROCEDURE — 80053 COMPREHEN METABOLIC PANEL: CPT | Performed by: INTERNAL MEDICINE

## 2025-04-23 PROCEDURE — 82378 CARCINOEMBRYONIC ANTIGEN: CPT | Performed by: INTERNAL MEDICINE

## 2025-04-23 PROCEDURE — 86696 HERPES SIMPLEX TYPE 2 TEST: CPT | Performed by: INTERNAL MEDICINE

## 2025-04-23 PROCEDURE — 99239 HOSP IP/OBS DSCHRG MGMT >30: CPT | Performed by: INTERNAL MEDICINE

## 2025-04-23 PROCEDURE — 83735 ASSAY OF MAGNESIUM: CPT | Performed by: INTERNAL MEDICINE

## 2025-04-23 RX ORDER — POTASSIUM CHLORIDE 14.9 MG/ML
20 INJECTION INTRAVENOUS
Status: DISCONTINUED | OUTPATIENT
Start: 2025-04-23 | End: 2025-04-23

## 2025-04-23 RX ORDER — DIPHENHYDRAMINE HCL 12.5 MG/5ML
25 SOLUTION ORAL EVERY 6 HOURS PRN
Status: DISCONTINUED | OUTPATIENT
Start: 2025-04-23 | End: 2025-04-23 | Stop reason: HOSPADM

## 2025-04-23 RX ORDER — FERROUS SULFATE 324(65)MG
324 TABLET, DELAYED RELEASE (ENTERIC COATED) ORAL
Qty: 30 TABLET | Refills: 0 | Status: SHIPPED | OUTPATIENT
Start: 2025-04-23

## 2025-04-23 RX ORDER — POTASSIUM CHLORIDE 1500 MG/1
40 TABLET, EXTENDED RELEASE ORAL ONCE
Status: COMPLETED | OUTPATIENT
Start: 2025-04-23 | End: 2025-04-23

## 2025-04-23 RX ADMIN — POTASSIUM CHLORIDE 40 MEQ: 1500 TABLET, EXTENDED RELEASE ORAL at 17:04

## 2025-04-23 NOTE — PLAN OF CARE
Problem: Prexisting or High Potential for Compromised Skin Integrity  Goal: Skin integrity is maintained or improved  Description: INTERVENTIONS:- Identify patients at risk for skin breakdown- Assess and monitor skin integrity- Assess and monitor nutrition and hydration status- Monitor labs - Assess for incontinence - Turn and reposition patient- Assist with mobility/ambulation- Relieve pressure over bony prominences- Avoid friction and shearing- Provide appropriate hygiene as needed including keeping skin clean and dry- Evaluate need for skin moisturizer/barrier cream- Collaborate with interdisciplinary team - Patient/family teaching- Consider wound care consult   4/23/2025 1358 by Mikcey Camargo RN  Outcome: Progressing  4/23/2025 1146 by Mickey Camargo RN  Outcome: Progressing     Problem: SAFETY ADULT  Goal: Patient will remain free of falls  Description: INTERVENTIONS:- Educate patient/family on patient safety including physical limitations- Instruct patient to call for assistance with activity - Consult OT/PT to assist with strengthening/mobility - Keep Call bell within reach- Keep bed low and locked with side rails adjusted as appropriate- Keep care items and personal belongings within reach- Initiate and maintain comfort rounds- Make Fall Risk Sign visible to staff- Apply yellow socks and bracelet for high fall risk patients- Consider moving patient to room near nurses station  INTERVENTIONS:- Educate patient/family on patient safety including physical limitations- Instruct patient to call for assistance with activity - Consult OT/PT to assist with strengthening/mobility - Keep Call bell within reach- Keep bed low and locked with side rails adjusted as appropriate- Keep care items and personal belongings within reach- Initiate and maintain comfort rounds- Make Fall Risk Sign visible to staff  4/23/2025 1358 by Mickey Camargo RN  Outcome: Progressing  4/23/2025 1146 by Mickey Camargo RN  Outcome:  Progressing  Goal: Maintain or return to baseline ADL function  Description: INTERVENTIONS:-  Assess patient's ability to carry out ADLs; assess patient's baseline for ADL function and identify physical deficits which impact ability to perform ADLs (bathing, care of mouth/teeth, toileting, grooming, dressing, etc.)- Assess/evaluate cause of self-care deficits - Assess range of motion- Assess patient's mobility; develop plan if impaired- Assess patient's need for assistive devices and provide as appropriate- Encourage maximum independence but intervene and supervise when necessary- Involve family in performance of ADLs- Assess for home care needs following discharge - Consider OT consult to assist with ADL evaluation and planning for discharge- Provide patient education as appropriate  Outcome: Progressing  Goal: Maintains/Returns to pre admission functional level  Description: INTERVENTIONS:- Perform AM-PAC 6 Click Basic Mobility/ Daily Activity assessment daily.- Set and communicate daily mobility goal to care team and patient/family/caregiver. - Collaborate with rehabilitation services on mobility goals if consulted  Outcome: Progressing     Problem: GENITOURINARY - ADULT  Goal: Maintains or returns to baseline urinary function  Description: INTERVENTIONS:- Assess urinary function- Encourage oral fluids to ensure adequate hydration if ordered- Administer IV fluids as ordered to ensure adequate hydration- Administer ordered medications as needed- Offer frequent toileting- Follow urinary retention protocol if ordered  4/23/2025 1358 by Mickey Camargo RN  Outcome: Progressing  4/23/2025 1146 by Mickey Camargo RN  Outcome: Progressing  Goal: Absence of urinary retention  Description: INTERVENTIONS:- Assess patient’s ability to void and empty bladder- Monitor I/O- Bladder scan as needed- Discuss with physician/AP medications to alleviate retention as needed- Discuss catheterization for long term situations as  appropriate  4/23/2025 1358 by Mickey Camargo RN  Outcome: Progressing  4/23/2025 1146 by Mickey Camargo RN  Outcome: Progressing     Problem: BEHAVIOR  Goal: Pt/Family maintain appropriate behavior and adhere to behavioral management agreement, if implemented  Description: INTERVENTIONS:- Assess the family dynamic - Encourage verbalization of thoughts and concerns in a socially appropriate manner- Assess patient/family's coping skills and non-compliant behavior (including use of illegal substances).- Utilize positive, consistent limit setting strategies supporting safety of patient, staff and others- Initiate consult with Case Management, Spiritual Care or other ancillary services as appropriate- If a patient's/visitor's behavior jeopardizes the safety of the patient, staff, or others, refer to organization procedure. - Notify Security of behavior or suspected illegal substances which indicate the need for search of the patient and/or belongings- Encourage participation in the decision making process about a behavioral management agreement; implement if patient meets criteria  Outcome: Progressing     Problem: Nutrition/Hydration-ADULT  Goal: Nutrient/Hydration intake appropriate for improving, restoring or maintaining nutritional needs  Description: Monitor and assess patient's nutrition/hydration status for malnutrition. Collaborate with interdisciplinary team and initiate plan and interventions as ordered.  Monitor patient's weight and dietary intake as ordered or per policy. Utilize nutrition screening tool and intervene as necessary. Determine patient's food preferences and provide high-protein, high-caloric foods as appropriate. INTERVENTIONS:- Monitor oral intake, urinary output, labs, and treatment plans- Assess nutrition and hydration status and recommend course of action- Evaluate amount of meals eaten- Assist patient with eating if necessary - Allow adequate time for meals- Recommend/ encourage appropriate  diets, oral nutritional supplements, and vitamin/mineral supplements- Order, calculate, and assess calorie counts as needed- Recommend, monitor, and adjust tube feedings and TPN/PPN based on assessed needs- Assess need for intravenous fluids- Provide specific nutrition/hydration education as appropriate- Include patient/family/caregiver in decisions related to nutrition  4/23/2025 1358 by Mickey Camargo RN  Outcome: Progressing  4/23/2025 1146 by Mickey Camargo RN  Outcome: Progressing     Problem: PAIN - ADULT  Goal: Verbalizes/displays adequate comfort level or baseline comfort level  Description: Interventions:- Encourage patient to monitor pain and request assistance- Assess pain using appropriate pain scale- Administer analgesics based on type and severity of pain and evaluate response- Implement non-pharmacological measures as appropriate and evaluate response- Consider cultural and social influences on pain and pain management- Notify physician/advanced practitioner if interventions unsuccessful or patient reports new pain  Outcome: Progressing     Problem: INFECTION - ADULT  Goal: Absence or prevention of progression during hospitalization  Description: INTERVENTIONS:- Assess and monitor for signs and symptoms of infection- Monitor lab/diagnostic results- Monitor all insertion sites, i.e. indwelling lines, tubes, and drains- Monitor endotracheal if appropriate and nasal secretions for changes in amount and color- [place] appropriate cooling/warming therapies per order- Administer medications as ordered- Instruct and encourage patient and family to use good hand hygiene technique- Identify and instruct in appropriate isolation precautions for identified infection/condition  Outcome: Progressing     Problem: DISCHARGE PLANNING  Goal: Discharge to home or other facility with appropriate resources  Description: INTERVENTIONS:- Identify barriers to discharge w/patient and caregiver- Arrange for needed discharge  resources and transportation as appropriate- Identify discharge learning needs (meds, wound care, etc.)- Arrange for interpretive services to assist at discharge as needed- Refer to Case Management Department for coordinating discharge planning if the patient needs post-hospital services based on physician/advanced practitioner order or complex needs related to functional status, cognitive ability, or social support system  Outcome: Progressing     Problem: Knowledge Deficit  Goal: Patient/family/caregiver demonstrates understanding of disease process, treatment plan, medications, and discharge instructions  Description: Complete learning assessment and assess knowledge base.Interventions:- Provide teaching at level of understanding- Provide teaching via preferred learning methods  Outcome: Progressing

## 2025-04-23 NOTE — QUICK NOTE
Notified by nursing that patient is refusing to speak with behavioral health, will engage patient this afternoon and ask her to reconsider.  Additionally, patient refusing other therapies to include contrast for CT chest abdomen pelvis. it should be noted that this test was requested by patient and patient's family and has been ordered in the outpatient setting for evaluation of ongoing cachexia and abdominal complaints.  Patient also self-discontinued peripheral IV overnight; have asked nursing to please reestablish IV access.

## 2025-04-23 NOTE — QUICK NOTE
Patient refusing to participate in psychiatric evaluation. Spoke to primary team attending. Will re-attempt later today or tomorrow morning. Primary team plans to perform additional medical testing. If the results of those tests come back negative, suspect patient will require 302 due to inability to care for self.

## 2025-04-23 NOTE — PLAN OF CARE
Problem: CONFUSION/THOUGHT DISTURBANCE  Goal: Thought disturbances (confusion, delirium, depression, dementia or psychosis) are managed to maintain or return to baseline mental status and functional level  Description: INTERVENTIONS:- Assess for possible contributors to  thought disturbance, including but not limited to medications, infection, impaired vision or hearing, underlying metabolic abnormalities, dehydration, respiratory compromise,  psychiatric diagnoses and notify attending PHYSICAN/AP- Monitor and intervene to maintain adequate nutrition, hydration, elimination, sleep and activity- Decrease environmental stimuli, including noise as appropriate.- Provide frequent contacts to provide refocusing, direction and reassurance as needed. Approach patient calmly with eye contact and at their level.- [place] high risk fall precautions, aspiration precautions and other safety measures, as indicated- If delirium suspected, notify physician/AP of change in condition and request immediate in-person evaluation- Pursue consults as appropriate including Geriatric (campus dependent), OT for cognitive evaluation/activity planning, psychiatric, pastoral care, etc.  Outcome: Not Progressing

## 2025-04-23 NOTE — PLAN OF CARE
Problem: BEHAVIOR  Goal: Pt/Family maintain appropriate behavior and adhere to behavioral management agreement, if implemented  Description: INTERVENTIONS:- Assess the family dynamic - Encourage verbalization of thoughts and concerns in a socially appropriate manner- Assess patient/family's coping skills and non-compliant behavior (including use of illegal substances).- Utilize positive, consistent limit setting strategies supporting safety of patient, staff and others- Initiate consult with Case Management, Spiritual Care or other ancillary services as appropriate- If a patient's/visitor's behavior jeopardizes the safety of the patient, staff, or others, refer to organization procedure. - Notify Security of behavior or suspected illegal substances which indicate the need for search of the patient and/or belongings- Encourage participation in the decision making process about a behavioral management agreement; implement if patient meets criteria  Outcome: Completed

## 2025-04-23 NOTE — NURSING NOTE
Spoke to the patient refused iron infusion for today and refused potassium infusion at this time. Patient stated that she needs to do more research on frequency of the iron infusion administration.

## 2025-04-23 NOTE — DISCHARGE SUMMARY
Discharge Summary - Hospitalist   Name: Cate Peña 49 y.o. female I MRN: [id number]  Unit/Bed#: E5 -01 I Date of Admission: 4/21/2025   Date of Service: 4/23/2025 I Hospital Day: 2     Assessment & Plan  Failure to thrive in adult  Patient with BMI of 13.63.  Patient states she has not been eating or drinking at home secondary to her fear of exposing herself to lithium and causing either lithium or hydrofluoric carbon toxicity.  Patient with signs of malnutrition on exam  At this time, patient refusing to eat as she fears this will make her believed lithium toxicity worse.  Patient agreeable to IV fluids overnight.  Psychiatry consult  Nutrition consult  Please see assessment and plan for Anxiety due to health     4/23-update: Patient now declining psychiatric consult without her family and  being present.  Does appear to have eaten overnight and oddly, her protein and albumin remain stable.   One would think given her BMI and apparent psychiatric illness that her protein and albumin would be more depleted.  Fortunately, she ate 75% of her lunch and a large portion of her breakfast based on bedside calorie count.  She reports she no longer fears lithium in her food and denies SI/HI.  Has met with dietitian and received information on improving p.o. intake.  Will prescribe protein drinks and iron supplementation on discharge; patient encouraged to follow-up with her behavioral health specialist immediately as well as PCP.  Additionally, tumor marker labs are pending and can be followed up with her PCP in the outpatient setting.  CTA abdomen pelvis has been ordered by GI and patient encouraged to follow-up with her allergist to ensure she has proper prophylaxis, if it is even needed, prior to undergoing contrast dye exposure for the study.    As per nursing, she is declining iron as well as contrast dye for a CTA chest abdomen pelvis that she and her family specifically requested have done this stay.   "Additionally, she pulled her IV out overnight and is refusing multiple nursing initiatives such as vitals.  Patient is refusing DVT prophylaxis with heparin as well as potassium supplements.  Each time a specific therapy is ordered, patient gives some unrelated excuse for not taking her ordered medications or treatments; i.e., \"I wanted to eat first\", or \"I want to research this on the Internet\", or \"I want to discuss this with my daughter\".  She is very polite and respectful, but unfortunately the end result is always the same; she invariably refuses almost all therapies offered with the exception of IV iron given yesterday.      Patient has a right to refuse any and all treatments; but given her cachexia, illness anxiety disorder, and delusions regarding lithium exposure; there has been some suggestion that she might benefit from a 201 versus 302.  Patient refused to sign 201 and unfortunately, without further investigation, I cannot rule out underlying metabolic/medical disorder that may be contributing to her cachexia.  There is no doubt she does suffer from multiple psychiatric illnesses to include an eating disorder, but without sufficient exclusion of other underlying acute medical issues, primary team feels it would be inappropriate to pursue her 302 at this time.  At bedside, both her mother and her sister report they are planning to arrange outpatient therapy and, Jannette, her sister appears to understand more clearly today that Cate does have underlying eating disorder/psychiatric concerns.  Anxiety about health  Patient stating that over the past 2 weeks she has had 2 phones which are reportedly leaking lithium.  She states that her entire home is mostly contaminated with the lithium and she feels as if she is having symptoms of hydrofluoric carbon toxicity and lithium toxicity.  Patient was recently seen at [facility] for similar complaint.  At that time, lithium level was checked which was " within normal limits.  Patient was provided reassurance at that time discharge.  However, she states that it is continuing to affect her life.  Patient has not eaten or drank in the last several days due to her concern that the lithium will spread throughout her body and further poison her.  Of note, patient with no signs of lithium toxicity.  Overall, basic labs are reassuring.  No KAYLIN.  Electrolytes WNL.  Hepatic functions normal.   Patient visibly anxious and not redirectable or reassurable about her health.  Patient very fixated on what she believes to be lithium exposure.  She is currently refusing to eat or drink as she feels that eating or drinking may expose her insides to lithium.  She repeatedly states that she does not believe this is a psychiatric issue and that she believes there is exposure to chemicals that is significantly affecting her.  Will check lithium level in a.m. and attempt to reassure patient that her lithium levels are not elevated.  Patient refusing any medications at this time  Psychiatry consult ordered by ED team.  Patient was seen by crisis worker in ED.  Initial plan was for possible psychiatric placement in ED however, the patient reported to them that she has not urinated in the past 24 hours which prompted them to reach out to internal medicine team for admission.    4/23-update: Patient has refused 3 attempts by behavioral health to interview and intervene.  She refuses 201.  Have advised her to follow-up with her home therapist and primary care as soon as possible.  She denies SI/HI; reports she no longer fears lithium in her food.  Hypothyroidism due to Hashimoto's thyroiditis  Continue levothyroxine  Iron deficiency anemia  Patient received 1 dose of IV Venofer on 4/22; as per nursing, refuses to take any further Venofer  Will discharge home with iron supplementation; patient encouraged to improve p.o. intake  Outpatient follow-up with primary care     Medical Problems        "Resolved Problems  Date Reviewed: 4/22/2025   None       Discharging Physician / Practitioner: Cristian Wills MD  PCP: No primary care provider on file.  Admission Date:   Admission Orders (From admission, onward)       Ordered        04/21/25 2225  INPATIENT ADMISSION  Once                          Discharge Date: 04/23/25    Consultations During Hospital Stay:  Behavioral health-patient declined    Procedures Performed:   CTA chest abdomen and pelvis ordered-patient declined    Significant Findings / Test Results:   Cachexia; BMI-14  Hypokalemia  Hypovolemia  Illness anxiety disorder  Likely delusions regarding lithium toxicity  Anorexia    Incidental Findings:   Iron deficiency anemia      Test Results Pending at Discharge (will require follow up):   None     Outpatient Tests Requested:  CBC/CMP  CTA abdomen and pelvis    Complications: None    Reason for Admission: Failure to thrive    Hospital Course:   As per HPI:    \"Cate Peña is a 49 y.o. female with a PMH of hypothroid who presents with reported recent dysphagia and failure to thrive over the past several weeks.  Patient states that she has extensive chemical allergies to many things.  As well, she states that she was recently exposed to a cell phone that was leaking lithium.  She does state that she was unable to smell it herself due to parasomnia however, she asked her daughter to smell it and her daughter stated that it smelled like lithium.  She states that she threw that cell phone out and then brought another 1.  As well, that cell phone was reportedly leaking lithium and exposing her to toxic levels of lithium.  Today, she reportedly noticed that her remote in her house was apparently leaking lithium.  She stated that she accidentally rubbed it on her face.  She states that she has not been eating or drinking due to the stress of her believed exposure to lithium.  As well, she is afraid to eat or drink as she feels this may spread her exposure to " "the lithium to her internal organs.  When offered food, drink, medications in the hospital patient refused as she is unsure what has been cross contaminated with lithium and what has not.  No chest pain, shortness of breath, headaches, lightheadedness, dizziness.  No dysuria.\"    Condition at Discharge: stable    Discharge Day Visit / Exam:   Subjective: Please see above for further details.    Vitals: Blood Pressure: 107/69 (04/23/25 1126)  Pulse: 94 (04/23/25 1126)  Temperature: 97.5 °F (36.4 °C) (04/23/25 1126)  Temp Source: Oral (04/22/25 0732)  Respirations: 18 (04/23/25 1126)  Height: 5' 5\" (165.1 cm) (04/23/25 1300)  Weight - Scale: 38.3 kg (84 lb 7 oz) (04/21/25 1548)  SpO2: 98 % (04/23/25 1126)  Physical Exam  Vitals and nursing note reviewed.   Constitutional:       General: She is not in acute distress.     Appearance: She is well-developed. She is ill-appearing.      Comments: Cachectic   HENT:      Head: Normocephalic and atraumatic.   Eyes:      Conjunctiva/sclera: Conjunctivae normal.   Cardiovascular:      Rate and Rhythm: Normal rate.   Pulmonary:      Effort: Pulmonary effort is normal. No respiratory distress.   Abdominal:      Palpations: Abdomen is soft.      Tenderness: There is no abdominal tenderness.   Musculoskeletal:         General: No swelling.      Cervical back: Neck supple.   Skin:     General: Skin is warm and dry.      Findings: Lesion present.      Comments: Erythematous macular lesion on right upper lip   Neurological:      Mental Status: She is alert and oriented to person, place, and time.   Psychiatric:      Comments: Anxious affect            Discussion with Family: Updated  (mother and sister) at bedside.    Discharge instructions/Information to patient and family:   See after visit summary for information provided to patient and family.      Provisions for Follow-Up Care:  See after visit summary for information related to follow-up care and any pertinent home " health orders.      Mobility at time of Discharge:   Basic Mobility Inpatient Raw Score: 24  JH-HLM Goal: 8: Walk 250 feet or more  JH-HLM Achieved: 8: Walk 250 feet ot more  HLM Goal achieved. Continue to encourage appropriate mobility.     Disposition:   Home    Planned Readmission: None    Discharge Medications:  See after visit summary for reconciled discharge medications provided to patient and/or family.      Administrative Statements   Discharge Statement:  I have spent a total time of 45 minutes in caring for this patient on the day of the visit/encounter. .    **Please Note: This note may have been constructed using a voice recognition system**

## 2025-04-23 NOTE — ASSESSMENT & PLAN NOTE
Patient received 1 dose of IV Venofer on 4/22; as per nursing, refuses to take any further Venofer  Will discharge home with iron supplementation; patient encouraged to improve p.o. intake  Outpatient follow-up with primary care

## 2025-04-23 NOTE — DISCHARGE INSTR - AVS FIRST PAGE
Please remember to take all medications as directed on your medication list and follow-up with your primary care provider in 1-2 weeks.    You are encouraged to keep your med list on your person (in your wallet or purse) and please take your medication list provided in this After Visit Summary to your PCP visit following discharge.    Please ask your nurse to show you the medication list and explain when to take your medications.    Additionally, we encourage all patient's to take their actual medications with them to their primary care visit! This is to verify you have the proper medications and the proper dosages.  Remember, while medications are often listed in your computer record; that may not always be right as mistakes can occur at the pharmacy or in the computer and sometimes old medication bottles can be mistaken for newer medications.    (Note to nursing: please place patient's medication list on top of the AVS.)

## 2025-04-23 NOTE — NURSING NOTE
Patient stated that her drivers license and some other ID was missing and that she will check at home and will let us know if its there. Patient requested PCA to throw away patients closing that she was wearing in the hospital to the garbage.

## 2025-04-23 NOTE — PROGRESS NOTES
"RN went in for assessment and medications. Pt refused heparin, stated she didn't need anything and when this RN went to leave the room; Pt stated that this RN fragrance was making her eyes itchy. Pt Asked for liquid benadryl. Pt had IV benedryl ordered and offered but pt refused stating she doesn't need it to be \"that strong\". Requested liquid bubble gum flavored benadryl as per pt this is what she takes at home. RN informed pt that the hospital might not carry that form and asked about alternative methods but pt stated this is what she needed.Assessment of pt done at this time. Pt was sitting comfortably on the bed holding the phone. Pt was not rubbing eyes at all, sclera was white and no drainage from eyes present.   Messaged physician per pt request, hospital does not carry this form but she could request family to bring it in if needed.   Pt stated \"Im not sure how to go about this, how do I go about getting a new nurse due to your fragrance?\" RN stated that she would let Charge RN know which was relayed directly after leaving patients doorway.  At this time patient is sitting comfortably in bed watching tv.   "

## 2025-04-23 NOTE — SPEECH THERAPY NOTE
Speech Language/Pathology  Speech/Language Pathology  Assessment    Patient Name: Cate Peña  Today's Date: 4/23/2025     Problem List  Principal Problem:    Failure to thrive in adult  Active Problems:    Hypothyroidism due to Hashimoto's thyroiditis    Anxiety about health    Past Medical History  Past Medical History:   Diagnosis Date    Asthma     Costochondritis     Deviated septum 2023    left-with spur    Disease of thyroid gland     Fibroid, uterine     Food poisoning 2023    Hiatal hernia 07/2024    sliding    Ovarian cyst     Psychiatric disorder     anxiety/ depression    Seasonal allergies     Thyroid disease     Vitamin D deficiency      Past Surgical History  Past Surgical History:   Procedure Laterality Date    EGD  07/2024    WISDOM TOOTH EXTRACTION            Bedside Swallow Evaluation:    Summary:  Please refer to note completed yesterday re previous MBS and EGD, bedside eval at [place].     Pt presented alert and pleasant. Stated she feels much better after getting iron yesterday. Recalled me from MBS 8/12/24/. Reported she has been eating 2500 calories of [employer] ice cream a day and is not gaining weight. Stated ~ 2 weeks ago it felt like her swallow improved. Seen in am and returned at lunch. Took multiple consecutive sips of gingerale w/ prompt transfer and swallow and no belch or cough. Pt also has portions of the penne britt and cheese quesadilla. Reported the grilled cheese she had yesterday was easier to get down than the quesadilla today. Mastication was mildly prolonged, adequate appearing. Transfer and swallows prompt. No cough or wet vocal quality. Pt felt she was doing ok. better. Advised to take sips in between if she needed to. Pt was hoping she could be seen w/ chicken tenders (I called the kitchen but was told no. Tray was full. Also had mac and cheese and idalmis cake). Pt advised that if she chews food well enough, and she does, that it should go down wnl/same as other  foods. Oral/pharyngeal stages appeared wfl/wnl.     Recommendations:  Diet:regular as tolerated  Liquid:thin  Meds:prefers meds in liquid form  Supervision:prn  Positioning:Upright  Strategies: chew well, alternate w/ sips as needed  Pt to take PO/Meds only when fully alert and upright.   Oral care  Reflux precautions  Eval only, No f/u tx indicated.     Consider consult w/:  GI ?? (Have seen OP)  Nutrition  Behavioral Health was consulted. Pt refused this am.     H&P/Admit info/ pertinent provider notes: ([facility]H noted above)  Please refer to H&P. Pt admitted w/ FTT.    Special Studies:  Noted CT soft tissue neck 11/11 and CT head 11/11. VBS done by me 8/12/24, see note done yesterday. Routine barium swallow and manometry have not been completed. Refer to multiple notes from [facility] and [facility].      Procalcitonin<=0.25ng/ml    WBC  4.31-10.16 Thousand/uL  6.95  4/23     Previous MBS:  8/12/24    Did the pt report pain? no  If yes, was nursing notified/was it addressed?    Reason for consult:  R/o aspiration  Determine safest and least restrictive diet  h/o reported dysphagia     Food Allergies:  Strawberry, sulfites, shelffish and shrimp derived products,    Current Diet:  regular   Premorbid diet:  Regular, ? Eating a lot of ice cream .   O2 requirement:  RA   Social/Prior living  Home w/ daughter   Voice/Speech:  WNL   Follows commands:    Cognitive status: Alert and pleasant     Oral [facility] exam:  Dentition:natural  Lips (VII):wnl  Tongue (XII):wfl  Secretion management:wnl    Esophageal stage:  Pt mentioned her sliding hiatal hernia a few times.     Results d/w:  Pt, nursing,  physician

## 2025-04-23 NOTE — ED NOTES
Crisis Team monitoring patient's medical progress and aware a psychiatry consult is planned for today. Will monitor results and assist with behavioral health treatment as appropriate.

## 2025-04-23 NOTE — PLAN OF CARE
Problem: Prexisting or High Potential for Compromised Skin Integrity  Goal: Skin integrity is maintained or improved  Description: INTERVENTIONS:- Identify patients at risk for skin breakdown- Assess and monitor skin integrity- Assess and monitor nutrition and hydration status- Monitor labs - Assess for incontinence - Turn and reposition patient- Assist with mobility/ambulation- Relieve pressure over bony prominences- Avoid friction and shearing- Provide appropriate hygiene as needed including keeping skin clean and dry- Evaluate need for skin moisturizer/barrier cream- Collaborate with interdisciplinary team - Patient/family teaching- Consider wound care consult   Outcome: Progressing     Problem: SAFETY ADULT  Goal: Patient will remain free of falls  Description: INTERVENTIONS:- Educate patient/family on patient safety including physical limitations- Instruct patient to call for assistance with activity - Consult OT/PT to assist with strengthening/mobility - Keep Call bell within reach- Keep bed low and locked with side rails adjusted as appropriate- Keep care items and personal belongings within reach- Initiate and maintain comfort rounds- Make Fall Risk Sign visible to staff- Apply yellow socks and bracelet for high fall risk patients- Consider moving patient to room near nurses station  Outcome: Progressing     Problem: GENITOURINARY - ADULT  Goal: Maintains or returns to baseline urinary function  Description: INTERVENTIONS:- Assess urinary function- Encourage oral fluids to ensure adequate hydration if ordered- Administer IV fluids as ordered to ensure adequate hydration- Administer ordered medications as needed- Offer frequent toileting- Follow urinary retention protocol if ordered  Outcome: Progressing  Goal: Absence of urinary retention  Description: INTERVENTIONS:- Assess patient’s ability to void and empty bladder- Monitor I/O- Bladder scan as needed- Discuss with physician/AP medications to alleviate  retention as needed- Discuss catheterization for long term situations as appropriate  Outcome: Progressing  Goal: Urinary catheter remains patent  Description: INTERVENTIONS:- Assess patency of urinary catheter- If patient has a chronic mckay, consider changing catheter if non-functioning- Follow guidelines for intermittent irrigation of non-functioning urinary catheter  Outcome: Progressing     Problem: DECISION MAKING  Goal: Pt/Family able to effectively weigh alternatives and participate in decision making related to treatment and care  Description: INTERVENTIONS:- Identify decision maker- Determine when there are differences among patient's view, family's view, and healthcare provider's view of patient condition and care goals- Facilitate patient/family articulation of goals for care- Help patient/family identify pros/cons of alternative solutions- Provide information as requested by patient/family- Respect patient/family rights related to privacy and sharing information - Serve as a liaison between patient, family and health care team- Initiate consults as appropriate (Ethics Team, Palliative Care, Family Care Conference, etc.)  Outcome: Progressing     Problem: CONFUSION/THOUGHT DISTURBANCE  Goal: Thought disturbances (confusion, delirium, depression, dementia or psychosis) are managed to maintain or return to baseline mental status and functional level  Description: INTERVENTIONS:- Assess for possible contributors to  thought disturbance, including but not limited to medications, infection, impaired vision or hearing, underlying metabolic abnormalities, dehydration, respiratory compromise,  psychiatric diagnoses and notify attending PHYSICAN/AP- Monitor and intervene to maintain adequate nutrition, hydration, elimination, sleep and activity- Decrease environmental stimuli, including noise as appropriate.- Provide frequent contacts to provide refocusing, direction and reassurance as needed. Approach patient  calmly with eye contact and at their level.- [place] high risk fall precautions, aspiration precautions and other safety measures, as indicated- If delirium suspected, notify physician/AP of change in condition and request immediate in-person evaluation- Pursue consults as appropriate including Geriatric (campus dependent), OT for cognitive evaluation/activity planning, psychiatric, pastoral care, etc.  Outcome: Progressing     Problem: BEHAVIOR  Goal: Pt/Family maintain appropriate behavior and adhere to behavioral management agreement, if implemented  Description: INTERVENTIONS:- Assess the family dynamic - Encourage verbalization of thoughts and concerns in a socially appropriate manner- Assess patient/family's coping skills and non-compliant behavior (including use of illegal substances).- Utilize positive, consistent limit setting strategies supporting safety of patient, staff and others- Initiate consult with Case Management, Spiritual Care or other ancillary services as appropriate- If a patient's/visitor's behavior jeopardizes the safety of the patient, staff, or others, refer to organization procedure. - Notify Security of behavior or suspected illegal substances which indicate the need for search of the patient and/or belongings- Encourage participation in the decision making process about a behavioral management agreement; implement if patient meets criteria  Outcome: Progressing     Problem: Nutrition/Hydration-ADULT  Goal: Nutrient/Hydration intake appropriate for improving, restoring or maintaining nutritional needs  Description: Monitor and assess patient's nutrition/hydration status for malnutrition. Collaborate with interdisciplinary team and initiate plan and interventions as ordered.  Monitor patient's weight and dietary intake as ordered or per policy. Utilize nutrition screening tool and intervene as necessary. Determine patient's food preferences and provide high-protein, high-caloric foods  as appropriate. INTERVENTIONS:- Monitor oral intake, urinary output, labs, and treatment plans- Assess nutrition and hydration status and recommend course of action- Evaluate amount of meals eaten- Assist patient with eating if necessary - Allow adequate time for meals- Recommend/ encourage appropriate diets, oral nutritional supplements, and vitamin/mineral supplements- Order, calculate, and assess calorie counts as needed- Recommend, monitor, and adjust tube feedings and TPN/PPN based on assessed needs- Assess need for intravenous fluids- Provide specific nutrition/hydration education as appropriate- Include patient/family/caregiver in decisions related to nutrition  Outcome: Progressing

## 2025-04-24 LAB — CANCER AG19-9 SERPL-ACNC: 7 U/ML (ref 0–35)

## 2025-04-24 NOTE — UTILIZATION REVIEW
NOTIFICATION OF ADMISSION DISCHARGE   This is a Notification of Discharge from [facility]. Please be advised that this patient has been discharge from our facility. Below you will find the admission and discharge date and time including the patient’s disposition.   UTILIZATION REVIEW CONTACT:  Utilization Review Assistants  Network Utilization Review Department  Phone: [phone] x carefully listen to the prompts. All voicemails are confidential.  Email: [email or web address]     ADMISSION INFORMATION  PRESENTATION DATE: 4/21/2025  3:37 PM  OBERVATION ADMISSION DATE: N/A  INPATIENT ADMISSION DATE: 4/21/25 10:25 PM   DISCHARGE DATE: 4/23/2025  7:40 PM   DISPOSITION:Home/Self Care    Network Utilization Review Department  ATTENTION: Please call with any questions or concerns to [phone] and carefully listen to the prompts so that you are directed to the right person. All voicemails are confidential.   For Discharge needs, contact Care Management DC Support Team at [phone] opt. 2  Send all requests for admission clinical reviews, approved or denied determinations and any other requests to dedicated fax number below belonging to the campus where the patient is receiving treatment. List of dedicated fax numbers for the Facilities:  FACILITY NAME UR FAX NUMBER   ADMISSION DENIALS (Administrative/Medical Necessity) [phone]   DISCHARGE SUPPORT TEAM ([facility]) [phone]   PARENT CHILD HEALTH (Maternity/NICU/Pediatrics) [phone]   [facility] [phone]   [facility] [phone]   [facility] [phone]   [facility] [phone]   [facility] [phone]   [facility] [phone]   [facility] [phone]   [facility] [phone]   [facility]  [facility] [phone]   [facility] [phone]   [facility] [phone]   [facility] [phone]

## 2025-04-28 ENCOUNTER — TELEPHONE (OUTPATIENT)
Age: 50
End: 2025-04-28

## 2025-04-28 NOTE — TELEPHONE ENCOUNTER
Pt. Was recently in hospital and would like Dr. Owusu help with short term disability and FMLA, her weight is between 84-87 lb., she has been off of work for 3 weeks, employer needs provider to provide medical documentation on her current issue, pt. Is also going to reach out to allergist, she does not feel well enough to go back to work due to weakness and low weight, pt. Is trying to focus on eating, pt. Also wanted to make Dr. Mcclain aware pt. had a bad experience in hospital, did not believe her allergy's were official and it was all delusional despite showing supportive documentation, pt. Wanted to make Dr. Owusu aware, pt. Spoke with  on floor and will follow up with patient, pt. [place] dehumanized and went to hospital because she was sick and did not feel she was properly assessed, pt. Felt they focused more on her mental health, ethics line number [phone] , pt. Is wanting to get outpatient CT scan and US done and will be scheduling , pt. [place] better after her iron infusion, pt. Asking for provider contact info for insurance in order to get disability information, gave central fax number and asked her to reach out to [facility] or employer to find out what needs to be done, please advise, thank you

## 2025-05-05 ENCOUNTER — TELEPHONE (OUTPATIENT)
Age: 50
End: 2025-05-05

## 2025-05-05 NOTE — TELEPHONE ENCOUNTER
Patient was in [facility] on 5/2 with TIA.  Patient wants you to look at blood levels and US that was completed while inpatient.      Is having increased issues with smell which is causing severe nausea and she is losing weight.  Along with severe chemical sensitivities and allergies which is causing more issues.  Needs paperwork for FMLA/STD completed. Had discussed prior but does not seem to be getting anywhere.  Please advise.

## 2025-05-16 ENCOUNTER — TELEPHONE (OUTPATIENT)
Age: 50
End: 2025-05-16

## 2025-05-16 NOTE — TELEPHONE ENCOUNTER
Pt calling in to give Dr. Owusu an update that she is in touch with [facility] surg  and filing a formal complaint on her behalf.     Pt is concerned as she is 83 lbs and would like to be admitted to the hospital to help gain weight. She understands we do not do direct admits but asking what she can do.   She does not want to go back to the hospital and be treated as a psych pt. She is still not sure how she feels on a feeding tube.   Concerned with mag level being elevated as well. Asking for updates CT scan as the ones on file was told they are .     For the past month was on benadryl and it was causing SE so now on claritin and feels better.     Pt has seen Dr. Crow Cullen ENT and diagnosis of   Phantosmia and dysosmia - so she smells things that are not there.   In 5 years is not wearing a face mask due to smells being trapped in. She has been smelling more intense now so even when she eats ice cream she smells the plastic from the bags.

## 2025-05-20 ENCOUNTER — TELEPHONE (OUTPATIENT)
Dept: GASTROENTEROLOGY | Facility: CLINIC | Age: 50
End: 2025-05-20

## 2025-05-20 NOTE — TELEPHONE ENCOUNTER
"Pt. Called back, pt. Called PCP and they can't do a direct admit either, pt. Dysphagia has returned and she is now 81 lb., pt. Feels when she goes to ER and gets admitted they don't address her symptoms, she's afraid of her swallowing issues right now with taking the prep for colonoscopy, asked if they do EGD or colonoscopy in ED, advised that is not an ER procedure but can have it done as an inpatient which she would have to be admitted through ER, pt. Afraid of going to ER because they focus on more psych issues and not her medical issues, pt. Is asking for order for colonoscopy and a virtual visit, pt. Also asking for xray for water pooling sensation in chest \"water rash\", pt. Also asking for CT of abdomen with contrast or without contrast? Pt. Eating cupcake and cheez it yesterday and feels food is stuck, pt. Is able to eat and drink without difficulty, reviewed s/s of food bolus and when to go to ER, Please advise and call patient to schedule testing and virtual appointment   "

## 2025-05-20 NOTE — TELEPHONE ENCOUNTER
At the request of Dr. Owusu, called pt to schedule office visit on 1st available office day.  Dr. Owusu is at  on June 6fh.  Call pt to offer 10 am or 2:45.  LM and sent back up eSoft message.

## 2025-05-20 NOTE — TELEPHONE ENCOUNTER
Pt's daughter Missy (has consent) returning a call. When I went to warm transfer to Missy pt Cate responded. I warm transfer to gi nurse Isadora.

## 2025-05-30 ENCOUNTER — TELEPHONE (OUTPATIENT)
Age: 50
End: 2025-05-30

## 2025-05-30 NOTE — TELEPHONE ENCOUNTER
Patient and mother calling regarding treatment at [facility].  Being transferred to [place] and they do not want to go there.  Want to go to [facility] with Dr. Owusu.  Did see message that provider from ED had called, secure chatted Dr. Owusu to let him know of call.

## 2025-06-02 ENCOUNTER — TELEPHONE (OUTPATIENT)
Age: 50
End: 2025-06-02

## 2025-06-02 NOTE — TELEPHONE ENCOUNTER
Patients GI provider:  Dr. Owusu    Number to return call: [phone]    Reason for call: Pt calling @ [facility] had CT, US, MRI, please review. Still not doing good with weight. She does best when going out to eat,Still trying to get STD, denied through allergist. May need letter, waiting to hear from [facility]. Allergist is saying due to low weight she is in Hyper-allergic state.  Taking Claritan 2x per day.   She wants provider to know she is out of hospital and needs help with STD    Scheduled procedure/appointment date if applicable: 6/6/25

## 2025-06-04 NOTE — TELEPHONE ENCOUNTER
Patient calling back regarding STD.  States paperwork needs to be filled out regarding solar uticaria and how it is affecting her body.  She is not responding to anti histamines so they are changing meds through allergist.  All of this is causing severe weight loss (only 80 lbs), malnutrition due to weight and failure to thrive.  Patient is extremely worried that this will be denied again.  Feels that hospitals are not dealing with real issue of nutrition and weight and then state she is fine and discharge her.  Worried this will negatively effect STD appeal.   Having dysphagia as well and unable to eat foods that she was eating last week.  Has to blend everything in order to get down.  Does state she has been going out to eat but that is not helping her gain weight.  Does not want this mentioned since will look bad for appeal.  DX with change in sense of smell by [facility] ENT which is effecting eating and nausea as well.  Wants to make sure everything has claim number and full detail of everything that is truly wrong.  Has only 45 days to appeal this but does not want everything sent in right away so can have more documentation of not feeling well.  Will be making multiple calls to us to say that she is sick and not feeling well and all this will need to be sent in as well.   Wants to know what exactly is being sent and why and wants copies of everything sent to her for approval.  Does not want anything sent that says she is better now like hospital visits.      Advised patient that this can all be discussed at OV on 6/6.  Patient concerned that she will be sick and miss OV even though it is a virtual.  States she was at restaurant and was exposed to an extremely sick .  Also asking for OV in July and August to be seen prior to appeal being due.  Advised unable to make urgent OV for her at this time for those months without approval from Dr. Owusu.  States understanding.

## 2025-06-05 ENCOUNTER — TELEPHONE (OUTPATIENT)
Age: 50
End: 2025-06-05

## 2025-06-05 NOTE — TELEPHONE ENCOUNTER
"Pt. Calling with concerns for aspiration due to drinking milk and not using a straw , pt. Keeps asking if she aspirated on her milk, advised I was unsure, advised if she I worried about aspirating to use a straw in the future when drinking milk, pt. Drinks water from a bottle and milkshakes and swallows without issue, pt. Also has silent aspiration and reflux and \"water rash' and is worried and anxious that she is aspirating, has a feeling of liquid hanging in her throat , discussed maybe seeing speech therapy for dysphagia concern and her dit but she said its hard for her to get to appointments due to her other conditions, she was evaluated in hospital and was told drinking from cup with straw was ok , she chews her food up so fine it was harder to assess her in the hospital, can she benefit from speech therapy referral as an outpatient? (Total length of call over 25 minutes)    " no

## 2025-06-05 NOTE — TELEPHONE ENCOUNTER
Patient calling back regarding STD and OV tomorrow.  States will try to be on time but is going to try to go to [employer] for lunch to try to eat and get calories tomorrow. So hoping that wont make her sick or late to OV.  If running late wants to be seen anyway.  Making sure we understand that paperwork must state that she has failure to thrive and is so sick and weak and can't focus on anything.  Spends all day working on STD.  States yesterday while working on STD was cold d/t being 80 lbs and turned off ac.  Patient continued working and then realized it was 75 degrees in house.  Now concerned of heat exhaustion since there was no moving air and she is only 80 lbs.  Turned ac back on and house did cool off but today is tired and has headache.  Advised most likely not from heat exhaustion.  States even her daughter is hot and is worried about her.  Did buy new foods to try but scared to try d/t nausea, stomach pain, increased dysphagia causing weight loss which is worse from chemical exposure several months ago.  Hyper-allergic state is making things worse so concerned about trying new foods or eating.  Advised several times that this will be discussed tomorrow during OV.  States only has 20 minutes so needs to keep us updated.      Discussed referrals for specialists and would like us to send out new ones if possible.      Did send MyChart message as well detailing all of this and more.  Sent information regarding speech language and how to document correctly.  Does not want any information form last hospital sent since only mentions failure to thrive and is more focused on mental health.  Advised patient that we will send what is pertinent and to withhold or change information is fraud.  Patient stated that we are not allowed to send anything unless she approves.

## 2025-06-06 ENCOUNTER — TELEPHONE (OUTPATIENT)
Age: 50
End: 2025-06-06

## 2025-06-06 ENCOUNTER — TELEMEDICINE (OUTPATIENT)
Dept: GASTROENTEROLOGY | Facility: CLINIC | Age: 50
End: 2025-06-06
Payer: COMMERCIAL

## 2025-06-06 ENCOUNTER — TELEPHONE (OUTPATIENT)
Dept: GASTROENTEROLOGY | Facility: CLINIC | Age: 50
End: 2025-06-06

## 2025-06-06 VITALS — WEIGHT: 80 LBS | BODY MASS INDEX: 13.31 KG/M2

## 2025-06-06 DIAGNOSIS — R62.7 FAILURE TO THRIVE IN ADULT: ICD-10-CM

## 2025-06-06 DIAGNOSIS — R13.12 OROPHARYNGEAL DYSPHAGIA: Primary | ICD-10-CM

## 2025-06-06 DIAGNOSIS — R11.0 NAUSEA: ICD-10-CM

## 2025-06-06 DIAGNOSIS — R13.19 ESOPHAGEAL DYSPHAGIA: ICD-10-CM

## 2025-06-06 DIAGNOSIS — E43 SEVERE PROTEIN-CALORIE MALNUTRITION (HCC): ICD-10-CM

## 2025-06-06 DIAGNOSIS — R63.4 WEIGHT LOSS, UNINTENTIONAL: ICD-10-CM

## 2025-06-06 DIAGNOSIS — K21.9 GASTROESOPHAGEAL REFLUX DISEASE WITHOUT ESOPHAGITIS: ICD-10-CM

## 2025-06-06 PROCEDURE — 98006 SYNCH AUDIO-VIDEO EST MOD 30: CPT | Performed by: INTERNAL MEDICINE

## 2025-06-06 NOTE — TELEPHONE ENCOUNTER
Pt requests  review her CT AP completed 05/30 at [facility].    Pt requests 04/21 ED notes from [facility] to NOT be included with her short term disability being sent to [facility].

## 2025-06-06 NOTE — TELEPHONE ENCOUNTER
Patient calling to say that she disconnected from virtual visit somehow and was worried would miss OV.  Called CV office and spoke with Laureen.  Patient back on call and waiting for doctor.

## 2025-06-06 NOTE — TELEPHONE ENCOUNTER
Patients GI provider:  Dr. Mcclain    Number to return call: [phone]    Reason for call: Pt calling requesting today virtual appt be switched to just a phone call due to her family taking her out to lunch. Explained a message would be sent to the office making sure it okay/ patient will await a cb    Scheduled procedure/appointment date if applicable:6/6/2025

## 2025-06-07 RX ORDER — POLYETHYLENE GLYCOL 3350, SODIUM CHLORIDE, SODIUM BICARBONATE, POTASSIUM CHLORIDE 420; 11.2; 5.72; 1.48 G/4L; G/4L; G/4L; G/4L
4000 POWDER, FOR SOLUTION ORAL ONCE
Qty: 4000 ML | Refills: 0 | Status: SHIPPED | OUTPATIENT
Start: 2025-06-07 | End: 2025-06-07

## 2025-06-07 RX ORDER — SODIUM CHLORIDE, SODIUM LACTATE, POTASSIUM CHLORIDE, CALCIUM CHLORIDE 600; 310; 30; 20 MG/100ML; MG/100ML; MG/100ML; MG/100ML
125 INJECTION, SOLUTION INTRAVENOUS CONTINUOUS
OUTPATIENT
Start: 2025-06-07

## 2025-06-07 NOTE — ASSESSMENT & PLAN NOTE
Orders:    Colonoscopy; Future    EGD; Future    polyethylene glycol-electrolytes (TriLyte) 4000 mL solution; Take 4,000 mL by mouth once for 1 dose Take 4000 mL by mouth once for 1 dose. Use as directed

## 2025-06-07 NOTE — ASSESSMENT & PLAN NOTE
Malnutrition Findings:                                 BMI Findings:           Body mass index is 13.31 kg/m².       Orders:    Colonoscopy; Future    EGD; Future    polyethylene glycol-electrolytes (TriLyte) 4000 mL solution; Take 4,000 mL by mouth once for 1 dose Take 4000 mL by mouth once for 1 dose. Use as directed

## 2025-06-07 NOTE — ASSESSMENT & PLAN NOTE
She has severe failure to thrive and weight loss and is now open to colonoscopy and would like a repeat upper endoscopy performed.  I will schedule this for her.  I will also schedule her for an urgent follow-up visit in 1 week given the severity of her symptoms.  She mentioned her need for short-term disability and I support this request given her severe weight loss and failure to thrive.  Orders:    Colonoscopy; Future    EGD; Future    polyethylene glycol-electrolytes (TriLyte) 4000 mL solution; Take 4,000 mL by mouth once for 1 dose Take 4000 mL by mouth once for 1 dose. Use as directed

## 2025-06-07 NOTE — PROGRESS NOTES
Virtual Regular Visit  Name: Cate Peña      : 1975      MRN: [id number]  Encounter Provider: Jer Owusu MD  Encounter Date: 2025   Encounter department: [facility] [place]  :  Assessment & Plan  Oropharyngeal dysphagia  I will refer her to speech therapy for swallowing evaluation as she is concerned she is not swallowing properly and aspirating.  She previously had an inpatient evaluation but she would like an additional outpatient evaluation given her concerns.  Orders:    Ambulatory Referral to Speech Therapy; Future    EGD; Future    Gastroesophageal reflux disease without esophagitis    Orders:    EGD; Future    Esophageal dysphagia    Orders:    EGD; Future    Failure to thrive in adult  She has severe failure to thrive and weight loss and is now open to colonoscopy and would like a repeat upper endoscopy performed.  I will schedule this for her.  I will also schedule her for an urgent follow-up visit in 1 week given the severity of her symptoms.  She mentioned her need for short-term disability and I support this request given her severe weight loss and failure to thrive.  Orders:    Colonoscopy; Future    EGD; Future    polyethylene glycol-electrolytes (TriLyte) 4000 mL solution; Take 4,000 mL by mouth once for 1 dose Take 4000 mL by mouth once for 1 dose. Use as directed    Nausea    Orders:    Colonoscopy; Future    EGD; Future    polyethylene glycol-electrolytes (TriLyte) 4000 mL solution; Take 4,000 mL by mouth once for 1 dose Take 4000 mL by mouth once for 1 dose. Use as directed    Severe protein-calorie malnutrition (HCC)  Malnutrition Findings:                                 BMI Findings:           Body mass index is 13.31 kg/m².       Orders:    Colonoscopy; Future    EGD; Future    polyethylene glycol-electrolytes (TriLyte) 4000 mL solution; Take 4,000 mL by mouth once for 1 dose Take 4000 mL by mouth once for 1 dose. Use as directed    Weight  loss, unintentional    Orders:    Colonoscopy; Future    EGD; Future    polyethylene glycol-electrolytes (TriLyte) 4000 mL solution; Take 4,000 mL by mouth once for 1 dose Take 4000 mL by mouth once for 1 dose. Use as directed      Assessment & Plan          History of Present Illness     She presents for follow-up of her severe weight loss and failure to thrive.  She continues to have poor appetite and difficulty swallowing.  She denies abdominal pain and vomiting but has nausea.  She denies any diarrhea, constipation, or bleeding but said she continues to lose weight and has had extreme difficulty gaining weight and eating a regular diet.  The only meals she is able to have are at [employer].    She is very concerned about her severe weight loss, poor appetite, and failure to thrive and open to further evaluation.  History of Present Illness      Review of Systems   Constitutional:  Positive for appetite change and unexpected weight change. Negative for chills, fatigue and fever.   HENT:  Positive for trouble swallowing.    Eyes:  Negative for visual disturbance.   Respiratory:  Negative for cough and shortness of breath.    Cardiovascular:  Negative for chest pain.   Gastrointestinal:  Positive for nausea. Negative for abdominal distention, abdominal pain, blood in stool, constipation, diarrhea and vomiting.   Musculoskeletal:  Negative for arthralgias, gait problem and myalgias.   Skin:  Negative for pallor and rash.   Neurological:  Negative for dizziness, weakness and headaches.   Hematological:  Negative for adenopathy. Does not bruise/bleed easily.   Psychiatric/Behavioral:  Positive for dysphoric mood. Negative for agitation. The patient is nervous/anxious.        Objective   Wt 36.3 kg (80 lb) Comment: virtual - verbal wt  BMI 13.31 kg/m²       PHYSICAL EXAMINATION:  Appearance and vitals taken from home devices    General Appearance:   Alert, cooperative, anxious in moderate distress, very thin    HEENT:  Normocephalic, atraumatic, anicteric. Neck supple, symmetrical, trachea midline.   Lungs:   Equal chest rise and unlabored breathing, normal effort, no coughing.   Cardiovascular:   No visualized JVD.   Abdomen:   No abdominal distension.   Skin:   No jaundice, rashes, or lesions.    Musculoskeletal:   Normal range of motion visualized.   Psych:  Normal affect and normal insight.   Neuro:  Alert and appropriate.         Administrative Statements   Encounter provider Jer Owusu MD    The Patient is located at Home and in the following state in which I hold an active license [place].    The patient was identified by name and date of birth. Cate Peña was informed that this is a telemedicine visit and that the visit is being conducted through the Epic Embedded platform. She agrees to proceed..  My office door was closed. No one else was in the room.  She acknowledged consent and understanding of privacy and security of the video platform. The patient has agreed to participate and understands they can discontinue the visit at any time.    I have spent a total time of 22 minutes in caring for this patient on the day of the visit/encounter including Risks and benefits of tx options, Instructions for management, Patient and family education, Importance of tx compliance, Impressions, Counseling / Coordination of care, Documenting in the medical record, Reviewing/placing orders in the medical record (including tests, medications, and/or procedures), and Obtaining or reviewing history  , not including the time spent for establishing the audio/video connection.

## 2025-06-08 ENCOUNTER — NURSE TRIAGE (OUTPATIENT)
Dept: OTHER | Facility: OTHER | Age: 50
End: 2025-06-08

## 2025-06-08 NOTE — TELEPHONE ENCOUNTER
"REASON FOR CONVERSATION: Mouth Injury    SYMPTOMS: pain and tingling on tongue after eating ice cream. Also has cold sensation in throat    OTHER HEALTH INFORMATION: pt is underweight    PROTOCOL DISPOSITION: See PCP Within 2 Weeks    CARE ADVICE PROVIDED: per on call dr- as long as pain is improving she should be ok. Dont think she needs to go to ED given she has no trouble breathing or airway swelling. She should avoid cold things until symptoms resolve.    PRACTICE FOLLOW-UP: none            Answer Assessment - Initial Assessment Questions  1. SYMPTOM: \"What's the main symptom you're concerned about?\" (e.g., chapped lips, dry mouth, lump, sores)      Tongue pain from eating ice cream    2. ONSET: \"When did the  tongue pain  start?\"      About an hour ago    4. CAUSE: \"What do you think is causing the symptoms?\"      Eating ice cream that was very cold    5. OTHER SYMPTOMS: \"Do you have any other symptoms?\" (e.g., fever, sore throat, toothache, swelling)     Cold sensation in back of throat      Pain is decreasing on tongue    Protocols used: Mouth Symptoms-Adult-AH    "

## 2025-06-08 NOTE — TELEPHONE ENCOUNTER
Reason for Disposition  • All other mouth symptoms  (Exceptions: dry mouth from not drinking enough liquids, chapped lips)    Protocols used: Mouth Symptoms-Adult-AH     [id number]  URL: [email or web address]  

## 2025-06-09 NOTE — TELEPHONE ENCOUNTER
"Pt calling. States she is concerned she could have gotten frostbite on her tongue from eating ice cream that was \"super cold.\"   She would like to try it today because it is a source of calories for her, but is concerned she will get frostbite on her tongue or re-injure her tongue. Pt states she is not having any pain and tongue is without redness or discoloration and appears to be healing.  Advised that she could allow ice cream to warm up first and try a small amount.  Pt is still concerned about frostbite.  Advised will reach out to provider.     Please advise.   "

## 2025-06-10 ENCOUNTER — TELEPHONE (OUTPATIENT)
Dept: GASTROENTEROLOGY | Facility: CLINIC | Age: 50
End: 2025-06-10

## 2025-06-10 ENCOUNTER — TRANSCRIBE ORDERS (OUTPATIENT)
Dept: GASTROENTEROLOGY | Facility: CLINIC | Age: 50
End: 2025-06-10

## 2025-06-10 NOTE — TELEPHONE ENCOUNTER
Phone call to pt, left message to call her PCP or ENT to deal with the tongue and mouth issue Dr Owusu doesn't deal with those issues.

## 2025-06-10 NOTE — TELEPHONE ENCOUNTER
Pt calling back in checking on status of her [facility] STD paperwork that she sent over to be filled out. Verified that she did complete most of the paperwork that was requested but Dr. Owusu still needs to fill out certain portions like medical terminology, etc. and sign. Pt requesting to be included with the paperwork any treatment plan, any and all referrals, nutrition notes (need to stipulate medically neccessary past 3 appointments d/t malnutrition and failure to thrive as Keegan is denying her past 3 appts), speech pathology, notes about electrolyte imbalance. She was also talking about notes from her allergist. She said that Dr. Owusu didn't want to write a letter to [employer] before, but if he is willing now? Pt is requesting all the forms be completed ASAP. Any additional questions can be directed to her ph: [phone] or her daughter's ph: [phone].  She says that completed paperwork can be sent to her via [employer] and she can email it to [facility].

## 2025-06-13 ENCOUNTER — TELEMEDICINE (OUTPATIENT)
Dept: GASTROENTEROLOGY | Facility: CLINIC | Age: 50
End: 2025-06-13
Payer: COMMERCIAL

## 2025-06-13 ENCOUNTER — TELEPHONE (OUTPATIENT)
Age: 50
End: 2025-06-13

## 2025-06-13 VITALS — BODY MASS INDEX: 13.99 KG/M2 | WEIGHT: 84 LBS | HEIGHT: 65 IN

## 2025-06-13 DIAGNOSIS — Z12.11 SCREENING FOR COLON CANCER: ICD-10-CM

## 2025-06-13 DIAGNOSIS — K21.9 GASTROESOPHAGEAL REFLUX DISEASE WITHOUT ESOPHAGITIS: ICD-10-CM

## 2025-06-13 DIAGNOSIS — R13.19 ESOPHAGEAL DYSPHAGIA: ICD-10-CM

## 2025-06-13 DIAGNOSIS — R11.0 NAUSEA: ICD-10-CM

## 2025-06-13 DIAGNOSIS — R63.4 WEIGHT LOSS, UNINTENTIONAL: Primary | ICD-10-CM

## 2025-06-13 PROCEDURE — 98006 SYNCH AUDIO-VIDEO EST MOD 30: CPT | Performed by: INTERNAL MEDICINE

## 2025-06-13 NOTE — ASSESSMENT & PLAN NOTE
She has severe failure to thrive and weight loss and is open to colonoscopy and would like a repeat upper endoscopy performed.  We have ordered these for her and she is going to schedule them.. I will also schedule her for an urgent follow-up visit in 4 weeks given the severity of her symptoms. She mentioned her need for short-term disability and I support this request given her severe weight loss and failure to thrive especially since she has lost 10 pounds since April 7, 2025 and she had already been severely underweight.

## 2025-06-13 NOTE — TELEPHONE ENCOUNTER
Patient called in because she was not seeing a link for her virtual visit. Transferred her over to [facility] for further assistance.

## 2025-06-13 NOTE — PROGRESS NOTES
Virtual Regular Visit  Name: Cate Peña      : 1975      MRN: [id number]  Encounter Provider: Jer Owusu MD  Encounter Date: 2025   Encounter department: [facility] [place]  :  Assessment & Plan  Weight loss, unintentional  She has severe failure to thrive and weight loss and is open to colonoscopy and would like a repeat upper endoscopy performed.  We have ordered these for her and she is going to schedule them.. I will also schedule her for an urgent follow-up visit in 4 weeks given the severity of her symptoms. She mentioned her need for short-term disability and I support this request given her severe weight loss and failure to thrive especially since she has lost 10 pounds since 2025 and she had already been severely underweight.       Esophageal dysphagia         Gastroesophageal reflux disease without esophagitis         Nausea         Screening for colon cancer  She requested a Cologuard test for colon cancer screening.  Orders:    Cologuard        Assessment & Plan          History of Present Illness   She presents for follow-up of her severe weight loss and failure to thrive.  She continues to have poor appetite and difficulty swallowing.  She denies abdominal pain and vomiting but has nausea.  She denies any diarrhea, constipation, or bleeding but said she continues to lose weight and has had extreme difficulty gaining weight and eating a regular diet.  The only meals she is able to have are at [employer].     She is very concerned about her severe weight loss, poor appetite, and failure to thrive and open to further evaluation.  She is particularly concerned about the 10 pound weight loss since 2025.  She is also concerned about possible allergy symptoms and that her weight loss and failure to thrive could be related to this.  She is in the process of scheduling her evaluations for speech and language pathology to assess her  "swallowing and the nutritionist and to schedule her upper endoscopy and colonoscopy.  History of Present Illness    REVIEW OF SYSTEMS:    CONSTITUTIONAL: Denies any fever, chills, rigors, but has 10 pound weight loss.  HEENT: No earache or tinnitus. Denies hearing loss or visual disturbances.  CARDIOVASCULAR: No chest pain or palpitations.   RESPIRATORY: Denies any cough, hemoptysis, shortness of breath or dyspnea on exertion.  GASTROINTESTINAL: As noted in the History of Present Illness.   GENITOURINARY: No problems with urination. Denies any hematuria or dysuria.  NEUROLOGIC: No dizziness or vertigo, denies headaches.   MUSCULOSKELETAL: Denies any muscle or joint pain.   SKIN: Denies skin rashes or itching.   ENDOCRINE: Denies excessive thirst. Denies intolerance to heat or cold.  PSYCHOSOCIAL: Denies depression or anxiety. Denies any recent memory loss.       Objective   Ht 5' 5\" (1.651 m)   Wt 38.1 kg (84 lb)   BMI 13.98 kg/m²           PHYSICAL EXAMINATION:  Appearance and vitals taken from home devices    General Appearance:   Alert, cooperative, no distress   HEENT:  Normocephalic, atraumatic, anicteric. Neck supple, symmetrical, trachea midline.   Lungs:   Equal chest rise and unlabored breathing, normal effort, no coughing.   Cardiovascular:   No visualized JVD.   Abdomen:   No abdominal distension.   Skin:   No jaundice, rashes, or lesions.    Musculoskeletal:   Normal range of motion visualized.   Psych:  Normal affect and normal insight.   Neuro:  Alert and appropriate.         Administrative Statements   Encounter provider Jer Owusu MD    The Patient is located at Home and in the following state in which I hold an active license [place].    The patient was identified by name and date of birth. Cate Peña was informed that this is a telemedicine visit and that the visit is being conducted through the Epic Embedded platform. She agrees to proceed..  My office door was closed. No one else was in the " room.  She acknowledged consent and understanding of privacy and security of the video platform. The patient has agreed to participate and understands they can discontinue the visit at any time.    I have spent a total time of 22 minutes in caring for this patient on the day of the visit/encounter including Prognosis, Risks and benefits of tx options, Instructions for management, Patient and family education, Importance of tx compliance, Risk factor reductions, Impressions, Counseling / Coordination of care, Documenting in the medical record, Reviewing/placing orders in the medical record (including tests, medications, and/or procedures), and Obtaining or reviewing history  , not including the time spent for establishing the audio/video connection.

## 2025-06-13 NOTE — PROGRESS NOTES
"Name: Cate Peña      : 1975      MRN: [id number]  Encounter Provider: Jer Owusu MD  Encounter Date: 2025   Encounter department: [facility] [place]  :  Assessment & Plan  Screening for colon cancer    Orders:    Cologuard    Esophageal dysphagia         Gastroesophageal reflux disease without esophagitis         Nausea         Weight loss, unintentional           Assessment & Plan        History of Present Illness {?Quick Links Encounters * My Last Note * Last Note in Specialty * Snapshot * Since Last Visit * History :79018}    History of Present Illness    {History obtained from(Optional):10107}  Review of Systems   Constitutional:  Positive for appetite change and unexpected weight change. Negative for chills, fatigue and fever.   HENT:  Negative for trouble swallowing.    Eyes:  Negative for visual disturbance.   Respiratory:  Negative for cough and shortness of breath.    Cardiovascular:  Negative for chest pain.   Gastrointestinal:  Negative for abdominal distention, abdominal pain, blood in stool, constipation, diarrhea, nausea and vomiting.   Musculoskeletal:  Negative for arthralgias, gait problem and myalgias.   Skin:  Negative for pallor and rash.   Neurological:  Negative for dizziness, weakness and headaches.   Hematological:  Negative for adenopathy. Does not bruise/bleed easily.   Psychiatric/Behavioral:  Negative for dysphoric mood. The patient is not nervous/anxious.     A complete review of systems is negative other than that noted above in the HPI.    {Select to Display PMH (Optional):47833}  Current Medications[1]  Objective {?Quick Links Trend Vitals * Enter New Vitals * Results Review * Timeline (Adult) * Labs * Imaging * Cardiology * Procedures * Lung Cancer Screening * Surgical eConsent :44638}  Ht 5' 5\" (1.651 m)   Wt 38.1 kg (84 lb)   BMI 13.98 kg/m²     Physical Exam   Physical Exam      Results    Lab Results: I personally reviewed " relevant lab results. {Lab Results Review (Optional):20050}    {Radiology Results Review (Optional):99589}  Results for orders placed during the hospital encounter of 07/24/24    EGD    Impression  3 cm type I hiatal hernia  The esophagus and duodenum appeared normal.  Performed forceps biopsies in the upper third of the esophagus, lower third of the esophagus, stomach and duodenum      RECOMMENDATION:  Await pathology results  Follow-up in our office for further evaluation and management.  If you don't have an appointment scheduled, please call [phone] to schedule your appointment.    Follow-up for outpatient video swallow study            Jer Owusu MD      {Administrative / Billing Section (Optional):82814}       [1]   Current Outpatient Medications   Medication Sig Dispense Refill    albuterol (PROVENTIL HFA,VENTOLIN HFA) 90 mcg/act inhaler Inhale 2 puffs every 6 (six) hours as needed      EPINEPHrine (EPIPEN) 0.3 mg/0.3 mL SOAJ Inject 0.3 mL (0.3 mg total) into a muscle if needed for anaphylaxis 0.6 mL 11    loratadine 5 mg/5 mL syrup Take 10 mg by mouth in the morning      loratadine-pseudoephedrine (CLARITIN-D 24-HOUR)  mg per 24 hr tablet Claritin 24 Hour Allergy      Neffy 2 MG/0.1ML SOLN 2 mg into each nostril if needed (ANAPHYLAXIS) 3 each 11    Synthroid 75 MCG tablet Take 1 tablet 6 days a week and no tab on Sunday. 26 tablet 0    ascorbic acid (VITAMIN C) 250 mg tablet Take 1,000 mg by mouth daily (Patient not taking: Reported on 4/5/2024)      cyanocobalamin (VITAMIN B-12) 250 MCG tablet Take 1 tablet (250 mcg total) by mouth daily 30 tablet 0    ergocalciferol (VITAMIN D2) 50,000 units  (Patient not taking: Reported on 12/10/2021)      famotidine (PEPCID) 20 mg/2.5 mL oral suspension Take 2.5 mL (20 mg total) by mouth 2 (two) times a day 150 mL 1    ferrous sulfate 324 (65 Fe) mg Take 1 tablet (324 mg total) by mouth daily before breakfast 30 tablet 0    Nutritional Supplements  (Nutritional Shake High Protein) LIQD Take 1 Container by mouth 3 (three) times a day 7110 mL 0    polyethylene glycol-electrolytes (TriLyte) 4000 mL solution Take 4,000 mL by mouth once for 1 dose Take 4000 mL by mouth once for 1 dose. Use as directed 4000 mL 0    sucralfate (CARAFATE) 1 g/10 mL suspension Take 10 mL (1 g total) by mouth 4 (four) times a day (with meals and at bedtime) 1200 mL 0     No current facility-administered medications for this visit.

## 2025-06-16 ENCOUNTER — TELEPHONE (OUTPATIENT)
Dept: GASTROENTEROLOGY | Facility: CLINIC | Age: 50
End: 2025-06-16

## 2025-06-16 NOTE — TELEPHONE ENCOUNTER
----- Message from Jer Owusu MD sent at 6/16/2025 12:29 PM EDT -----  Regarding: RE: 4 week f/u  Hi Adeline, could we do virtual July 15th around 2:40pm when I'm at [place]? If not, I can do it during my  hospital week. Thanks!  ----- Message -----  From: Adeline Severino  Sent: 6/16/2025   7:39 AM EDT  To: Jer Owusu MD  Subject: 4 week f/u                                       Hi Dr. Owusu,I hope you had a nice father's day weekend. As discussed on Friday, you wanted to have this patient be seen around 4 weeks for her next f/u appt. Would you be agreeable to do another virtual appt while you're covering the hospital the week of July 21st in ? Please let me know what date/ time would work for ou that week. Thanks!  
I called & lvm for the patient regarding scheduling virtual appt with Dr. Owusu for 7/15 @ 2:20 PM. I advised them in the voicemail that I scheduled the OV for them but if they're unable to do that date and time to call our office back to reschedule with Dr. Owusu.   
yes

## 2025-06-18 ENCOUNTER — TELEPHONE (OUTPATIENT)
Age: 50
End: 2025-06-18

## 2025-06-18 NOTE — TELEPHONE ENCOUNTER
"Pt calling in with a few updates/ question for Dr. Owusu:       She wanted to thank Dr. Owusu for the help with filing short term disability.    Reports a  may be contacted or mail info from [employer] if there is any issues with the claim and this would come from a \"Tyler Perez\"      2. Did receive cologuard kit but unsure when she will complete this.      3. Has a new therapist to help with eating issues and is trying to get a diagnosis of autism because of dysphagia and her issues with eating- textures. She is going to try to use an outside network for nutrition but if that does not work out will contact  and keep provider updated on how that goes.      3. New psychologist was concerned with mag level being elevate and her kidney function. Pt asking if an update mag level and BUN/ creatine can be done to check.   I did review with pt BUN/ creatine was normal on 5/29 but magnesium level was not done at that time.     Pt was drinking carnation Breakfast essential but stopped a month before labs were done.     4. Pt asking if she can restart the carnation breakfast essential as she was using this to trey other vitamins.     "

## 2025-06-19 DIAGNOSIS — E43 SEVERE PROTEIN-CALORIE MALNUTRITION (HCC): ICD-10-CM

## 2025-06-19 DIAGNOSIS — R63.4 WEIGHT LOSS, UNINTENTIONAL: Primary | ICD-10-CM

## 2025-06-19 DIAGNOSIS — R62.7 FAILURE TO THRIVE IN ADULT: ICD-10-CM

## 2025-06-19 DIAGNOSIS — E83.41 HYPERMAGNESEMIA: ICD-10-CM

## 2025-06-19 NOTE — TELEPHONE ENCOUNTER
Pt is appreciative for the Mag and BMP orders. She reports her psychologist is requesting these additional orders:  *She notes she does not have a PCP therefore is requesting them from GI*    Cate Peña to [facility] (supporting Jer Owusu MD)      6/19/25  2:50 PM  can you order these tests for me please for my psychologist  thank you  Measurement of height and weight  2. Temperature  3. Heart rate and blood pressure  4. Complete blood count  5. Comprehensive panel to include electrolytes, renal function tests, and liver enzymes  6. Electrocardiogram (ECG) to rule out bradycardia, prolonged QTc, and other arrhythmias  7. Thyroid stimulating hormone (TSH) and thyroxine (T4)  8. Pancreatic enzymes (amylase and lipase)  9. Gonadotrophin (LH and FSH) and sex steroids (estradiol and testosterone)  10. Erythrocyte sedimentation rate (ESR)

## 2025-06-21 DIAGNOSIS — R23.2 FACIAL FLUSHING: ICD-10-CM

## 2025-06-21 DIAGNOSIS — R62.7 FAILURE TO THRIVE IN ADULT: ICD-10-CM

## 2025-06-21 DIAGNOSIS — E06.3 HYPOTHYROIDISM DUE TO HASHIMOTO'S THYROIDITIS: ICD-10-CM

## 2025-06-21 DIAGNOSIS — L65.9 HAIR LOSS: ICD-10-CM

## 2025-06-21 DIAGNOSIS — R63.4 UNINTENTIONAL WEIGHT LOSS OF MORE THAN 10 POUNDS: ICD-10-CM

## 2025-06-21 DIAGNOSIS — E43 SEVERE PROTEIN-CALORIE MALNUTRITION (HCC): ICD-10-CM

## 2025-06-21 DIAGNOSIS — R11.0 NAUSEA: ICD-10-CM

## 2025-06-21 DIAGNOSIS — R63.4 WEIGHT LOSS, UNINTENTIONAL: Primary | ICD-10-CM

## 2025-06-23 NOTE — TELEPHONE ENCOUNTER
Called and spoke with patient.Informed her of recommendations.She verbalized understanding.She is also requesting orders for a nutrition eval with a specific dx code and she would like to get her Vit C level done.

## 2025-06-24 DIAGNOSIS — R62.7 FAILURE TO THRIVE IN ADULT: ICD-10-CM

## 2025-06-24 DIAGNOSIS — R63.4 WEIGHT LOSS, UNINTENTIONAL: Primary | ICD-10-CM

## 2025-06-24 DIAGNOSIS — R79.89 LOW SERUM VITAMIN C: ICD-10-CM

## 2025-06-24 DIAGNOSIS — E43 SEVERE PROTEIN-CALORIE MALNUTRITION (HCC): ICD-10-CM

## 2025-06-24 NOTE — TELEPHONE ENCOUNTER
Called and spoke with patient.Informed her of new orderes.Patient verbalized understanding.Patient wants to know if eating chocolate would raise heavy metals?

## 2025-06-25 NOTE — TELEPHONE ENCOUNTER
Patients GI provider:  Dr. Jer Owusu    Number to return call: [phone]    Reason for call: Jeanna from Dr. Mir office, disability questions    Jeanna from Dr Mir called regarding review of PT disability claim, CASE # [id number]. She needs to find out if Dr Owusu put pt on any restrictions or limitations.   Please advise Jeanna tel [phone]

## 2025-06-27 ENCOUNTER — TELEPHONE (OUTPATIENT)
Age: 50
End: 2025-06-27

## 2025-06-27 NOTE — TELEPHONE ENCOUNTER
Pt. Calling stating [employer] got back  to her and return report to Dr. Owusu , approved fro FMLA so he is hoping that will benefit her short term disability, pt. Has dietician appointment but needs to see speech and language first and has a virtual with them this week, pt. Also tried an off brand of ice cream today because she could not get to [employer] for her usual chicken sandwich, mac n cheese, 2 shakes and a mudpie that she eats without issue, she can tolerate cayla and Janusz ice cream, [employer] and [employer] ice cream but this off brand she bought today caused her to start foaming and inability to swallow properly, pt. Now has a ride to [employer] [place] and wants to eat her usual and is afraid she will have too much fluid between the 1/4  of a ice cream she ate and the food she will have at [employer] she is afraid it will cause her to go into fluid overload with her kidneys, advised she will not go into fluid overload and can eat her normal dinner, pt. Appreciative and had no other information to share with Dr. Owusu

## 2025-07-02 NOTE — TELEPHONE ENCOUNTER
I called and spoke with patient we reviewed Dr. Mcclain's message pt verbalized understanding     Cate will send mychart message

## 2025-07-11 ENCOUNTER — TELEPHONE (OUTPATIENT)
Age: 50
End: 2025-07-11

## 2025-07-11 NOTE — TELEPHONE ENCOUNTER
Wants to thank us for STD additional letter.  Are you willing to speak with independent physician for STD.  Because [employer] has taken so long - will be calling .  Wants to know if willing to speak with .      Symptoms - did get approved for extension for end of July.      Still same symptoms.  Does not matter diagnosis -  states it does not matter only symptoms.    Nausea, abdominal pain occasionally, failure to thrive, 85 lb weight, fatigue, weakness, inability to concentrate.  Also having tingling of tongue - unsure if from reflux or vitamin deficiency or from sensitivities.  Also heard of burning mouth syndrome - would like to discuss diagnoses for this.  Having metallic taste in mouth.  Would like pathways for what that could be via MyChart.  Reached out rhinologist d/t worsening symptoms which is increasing nausea.  Light reactions are getting more severe as well which is affecting her gastro wise as well.

## 2025-07-15 ENCOUNTER — TELEMEDICINE (OUTPATIENT)
Dept: GASTROENTEROLOGY | Facility: CLINIC | Age: 50
End: 2025-07-15
Payer: COMMERCIAL

## 2025-07-15 DIAGNOSIS — E43 SEVERE PROTEIN-CALORIE MALNUTRITION (HCC): ICD-10-CM

## 2025-07-15 DIAGNOSIS — D50.9 IRON DEFICIENCY ANEMIA, UNSPECIFIED IRON DEFICIENCY ANEMIA TYPE: ICD-10-CM

## 2025-07-15 DIAGNOSIS — K21.9 GASTROESOPHAGEAL REFLUX DISEASE WITHOUT ESOPHAGITIS: Primary | ICD-10-CM

## 2025-07-15 DIAGNOSIS — R63.4 WEIGHT LOSS, UNINTENTIONAL: ICD-10-CM

## 2025-07-15 DIAGNOSIS — R11.0 NAUSEA: ICD-10-CM

## 2025-07-15 DIAGNOSIS — R62.7 FAILURE TO THRIVE IN ADULT: ICD-10-CM

## 2025-07-15 DIAGNOSIS — R13.19 ESOPHAGEAL DYSPHAGIA: ICD-10-CM

## 2025-07-15 PROCEDURE — 99214 OFFICE O/P EST MOD 30 MIN: CPT | Performed by: INTERNAL MEDICINE

## 2025-07-17 ENCOUNTER — TELEPHONE (OUTPATIENT)
Age: 50
End: 2025-07-17

## 2025-07-17 NOTE — TELEPHONE ENCOUNTER
Calling regarding disability claim for [employer].  Requesting peer to peer with provider.      [phone] to call to schedule appt for peer to peer.  If goes to [facility] option 1 to proceed with case number    Case Number: [id number]

## 2025-07-18 NOTE — TELEPHONE ENCOUNTER
Nohelia from Dr. Mir office, reviewing provider for disability claim, calling for update, advised Dr. Owusu was made aware,  please call Dr. Mir office when available to schedule for peer to peer review [phone] option 1, case number [id number]

## 2025-07-22 NOTE — PROGRESS NOTES
Virtual Regular Visit  Name: Cate Peña      : 1975      MRN: [id number]  Encounter Provider: Jer Owusu MD  Encounter Date: 7/15/2025   Encounter department: [facility] [place]  :  Assessment & Plan  Gastroesophageal reflux disease without esophagitis  Esophageal dysphagia  Nausea  Iron deficiency anemia, unspecified iron deficiency anemia type  Failure to thrive in adult  Severe protein-calorie malnutrition (HCC)  Weight loss, unintentional  She has severe failure to thrive and weight loss and is agreeable to a colonoscopy and upper endoscopy so we have ordered these and she is going to schedule them.  I will also schedule her for an urgent follow-up visit in about 4-8 weeks given the severity of her symptoms.  I support her request for short-term disability given her severe weight loss and failure to thrive as she said this is impacted her ability to concentrate and she feels her health issues have impacted her ability to work.           History of Present Illness     History of Present Illness  She presents for follow-up of her severe weight loss and failure to thrive.  She continues to have poor appetite and difficulty swallowing.  She denies abdominal pain and vomiting but has nausea.  She denies any diarrhea, constipation, or bleeding but said she continues to lose weight and has had extreme difficulty gaining weight and eating a regular diet.  She has had minimal oral intake.     She is very concerned about her severe weight loss, poor appetite, and failure to thrive and open to further evaluation. She is also concerned about possible allergy symptoms and that her weight loss and failure to thrive could be related to this.  She is in the process of scheduling her evaluations for speech and language pathology to assess her swallowing and the nutritionist and to schedule her upper endoscopy and colonoscopy.  She is upset that her disability request was denied and said  this has added to her stress and anxiety.    Review of Systems   Constitutional:  Positive for activity change, appetite change, fatigue and unexpected weight change. Negative for chills and fever.   HENT:  Negative for trouble swallowing.    Eyes:  Negative for visual disturbance.   Respiratory:  Negative for cough and shortness of breath.    Cardiovascular:  Negative for chest pain.   Gastrointestinal:  Positive for nausea. Negative for abdominal distention, abdominal pain, blood in stool, constipation, diarrhea and vomiting.   Musculoskeletal:  Negative for arthralgias, gait problem and myalgias.   Skin:  Negative for pallor and rash.   Neurological:  Positive for dizziness. Negative for weakness and headaches.   Hematological:  Negative for adenopathy. Does not bruise/bleed easily.   Psychiatric/Behavioral:  Positive for dysphoric mood. The patient is nervous/anxious.        PHYSICAL EXAMINATION:  Appearance and vitals taken from home devices      Objective   There were no vitals taken for this visit.  General Appearance:   Alert, cooperative, no distress, thin   HEENT:  Normocephalic, atraumatic, anicteric. Neck supple, symmetrical, trachea midline.   Lungs:   Equal chest rise and unlabored breathing, normal effort, no coughing.   Cardiovascular:   No visualized JVD.   Abdomen:   No abdominal distension.   Skin:   No jaundice, rashes, or lesions.    Musculoskeletal:   Normal range of motion visualized.   Psych:  Normal affect and normal insight.   Neuro:  Alert and appropriate.         Administrative Statements   Encounter provider Jer Owusu MD    The Patient is located at Home and in the following state in which I hold an active license [place].    The patient was identified by name and date of birth. Cate Peña was informed that this is a telemedicine visit and that the visit is being conducted through the Epic Embedded platform. She agrees to proceed..  My office door was closed. No one else was in the room.   She acknowledged consent and understanding of privacy and security of the video platform. The patient has agreed to participate and understands they can discontinue the visit at any time.    I have spent a total time of 22 minutes in caring for this patient on the day of the visit/encounter including Instructions for management, Importance of tx compliance, Impressions, Counseling / Coordination of care, Documenting in the medical record, Reviewing/placing orders in the medical record (including tests, medications, and/or procedures), and Obtaining or reviewing history  , not including the time spent for establishing the audio/video connection.

## 2025-08-01 ENCOUNTER — TELEPHONE (OUTPATIENT)
Age: 50
End: 2025-08-01

## 2025-08-03 ENCOUNTER — NURSE TRIAGE (OUTPATIENT)
Dept: OTHER | Facility: OTHER | Age: 50
End: 2025-08-03

## 2025-08-04 DIAGNOSIS — E43 SEVERE PROTEIN-CALORIE MALNUTRITION (HCC): ICD-10-CM

## 2025-08-04 DIAGNOSIS — R63.4 UNINTENTIONAL WEIGHT LOSS OF MORE THAN 10 POUNDS: ICD-10-CM

## 2025-08-04 DIAGNOSIS — R62.7 FAILURE TO THRIVE IN ADULT: ICD-10-CM

## 2025-08-04 DIAGNOSIS — R13.12 OROPHARYNGEAL DYSPHAGIA: ICD-10-CM

## 2025-08-04 DIAGNOSIS — J02.9 SORE THROAT: Primary | ICD-10-CM

## 2025-08-07 DIAGNOSIS — E43 SEVERE PROTEIN-CALORIE MALNUTRITION (HCC): ICD-10-CM

## 2025-08-07 DIAGNOSIS — R63.4 UNINTENTIONAL WEIGHT LOSS OF MORE THAN 10 POUNDS: Primary | ICD-10-CM

## 2025-08-07 DIAGNOSIS — R62.7 FAILURE TO THRIVE IN ADULT: ICD-10-CM

## 2025-08-08 ENCOUNTER — NURSE TRIAGE (OUTPATIENT)
Dept: OTHER | Facility: OTHER | Age: 50
End: 2025-08-08

## 2025-08-08 ENCOUNTER — APPOINTMENT (OUTPATIENT)
Dept: LAB | Facility: MEDICAL CENTER | Age: 50
End: 2025-08-08
Payer: COMMERCIAL

## 2025-08-14 ENCOUNTER — TELEPHONE (OUTPATIENT)
Age: 50
End: 2025-08-14

## 2025-08-14 ENCOUNTER — RESULTS FOLLOW-UP (OUTPATIENT)
Dept: GASTROENTEROLOGY | Facility: CLINIC | Age: 50
End: 2025-08-14

## 2025-08-19 ENCOUNTER — PATIENT OUTREACH (OUTPATIENT)
Dept: CASE MANAGEMENT | Facility: OTHER | Age: 50
End: 2025-08-19

## 2025-08-22 ENCOUNTER — TELEPHONE (OUTPATIENT)
Age: 50
End: 2025-08-22

## 2025-08-23 ENCOUNTER — NURSE TRIAGE (OUTPATIENT)
Dept: OTHER | Facility: OTHER | Age: 50
End: 2025-08-23